# Patient Record
Sex: FEMALE | Race: ASIAN | NOT HISPANIC OR LATINO | Employment: PART TIME | URBAN - METROPOLITAN AREA
[De-identification: names, ages, dates, MRNs, and addresses within clinical notes are randomized per-mention and may not be internally consistent; named-entity substitution may affect disease eponyms.]

---

## 2017-08-11 ENCOUNTER — OFFICE VISIT - HEALTHEAST (OUTPATIENT)
Dept: FAMILY MEDICINE | Facility: CLINIC | Age: 37
End: 2017-08-11

## 2017-08-11 DIAGNOSIS — Z30.09 BIRTH CONTROL COUNSELING: ICD-10-CM

## 2017-08-11 DIAGNOSIS — K21.9 GASTROESOPHAGEAL REFLUX DISEASE WITHOUT ESOPHAGITIS: ICD-10-CM

## 2017-08-11 ASSESSMENT — MIFFLIN-ST. JEOR: SCORE: 1151.29

## 2017-09-21 ENCOUNTER — OFFICE VISIT - HEALTHEAST (OUTPATIENT)
Dept: FAMILY MEDICINE | Facility: CLINIC | Age: 37
End: 2017-09-21

## 2017-09-21 DIAGNOSIS — Z00.00 PREVENTATIVE HEALTH CARE: ICD-10-CM

## 2017-09-21 DIAGNOSIS — Z01.89 VISIT FOR LABORATORY TEST: ICD-10-CM

## 2017-09-21 DIAGNOSIS — K21.9 GASTROESOPHAGEAL REFLUX DISEASE WITHOUT ESOPHAGITIS: ICD-10-CM

## 2017-09-21 LAB
CHOLEST SERPL-MCNC: 232 MG/DL
FASTING STATUS PATIENT QL REPORTED: NO
HDLC SERPL-MCNC: 40 MG/DL
LDLC SERPL CALC-MCNC: ABNORMAL MG/DL
TRIGL SERPL-MCNC: 405 MG/DL

## 2017-09-21 ASSESSMENT — MIFFLIN-ST. JEOR: SCORE: 1151.41

## 2017-09-28 ENCOUNTER — OFFICE VISIT - HEALTHEAST (OUTPATIENT)
Dept: FAMILY MEDICINE | Facility: CLINIC | Age: 37
End: 2017-09-28

## 2017-09-28 DIAGNOSIS — F81.9 LEARNING PROBLEM: ICD-10-CM

## 2017-09-28 DIAGNOSIS — F43.10 PTSD (POST-TRAUMATIC STRESS DISORDER): ICD-10-CM

## 2017-09-28 DIAGNOSIS — Z78.9 NON-ENGLISH SPEAKING PATIENT: ICD-10-CM

## 2017-10-06 ENCOUNTER — OFFICE VISIT - HEALTHEAST (OUTPATIENT)
Dept: FAMILY MEDICINE | Facility: CLINIC | Age: 37
End: 2017-10-06

## 2017-10-06 DIAGNOSIS — E78.1 HYPERTRIGLYCERIDEMIA: ICD-10-CM

## 2017-10-06 DIAGNOSIS — K21.9 GASTROESOPHAGEAL REFLUX DISEASE WITHOUT ESOPHAGITIS: ICD-10-CM

## 2017-10-06 DIAGNOSIS — M54.9 BACK PAIN: ICD-10-CM

## 2017-10-06 DIAGNOSIS — R59.1 LYMPHADENOPATHY: ICD-10-CM

## 2017-10-06 LAB
CHOLEST SERPL-MCNC: 253 MG/DL
FASTING STATUS PATIENT QL REPORTED: YES
HDLC SERPL-MCNC: 48 MG/DL
LDLC SERPL CALC-MCNC: 141 MG/DL
TRIGL SERPL-MCNC: 321 MG/DL

## 2017-10-19 ENCOUNTER — OFFICE VISIT - HEALTHEAST (OUTPATIENT)
Dept: FAMILY MEDICINE | Facility: CLINIC | Age: 37
End: 2017-10-19

## 2017-10-19 DIAGNOSIS — Z12.4 ENCOUNTER FOR SCREENING FOR CERVICAL CANCER: ICD-10-CM

## 2017-10-19 DIAGNOSIS — N89.8 VAGINAL ITCHING: ICD-10-CM

## 2017-10-19 DIAGNOSIS — E78.00 HYPERCHOLESTEREMIA: ICD-10-CM

## 2017-10-19 DIAGNOSIS — E78.1 HYPERTRIGLYCERIDEMIA: ICD-10-CM

## 2017-10-19 ASSESSMENT — MIFFLIN-ST. JEOR: SCORE: 1151.69

## 2017-10-24 LAB
HPV INTERPRETATION - HISTORICAL: NORMAL
HPV INTERPRETER - HISTORICAL: NORMAL

## 2017-11-17 ENCOUNTER — OFFICE VISIT - HEALTHEAST (OUTPATIENT)
Dept: FAMILY MEDICINE | Facility: CLINIC | Age: 37
End: 2017-11-17

## 2017-11-17 DIAGNOSIS — R73.09 ABNORMAL GLUCOSE: ICD-10-CM

## 2017-11-17 DIAGNOSIS — K21.9 GASTROESOPHAGEAL REFLUX DISEASE WITHOUT ESOPHAGITIS: ICD-10-CM

## 2017-11-17 DIAGNOSIS — E78.5 HYPERLIPIDEMIA: ICD-10-CM

## 2017-11-17 DIAGNOSIS — N89.8 VAGINAL IRRITATION: ICD-10-CM

## 2017-11-17 DIAGNOSIS — E78.1 HYPERTRIGLYCERIDEMIA: ICD-10-CM

## 2017-11-17 DIAGNOSIS — E11.9 DIABETES (H): ICD-10-CM

## 2017-11-17 LAB — HBA1C MFR BLD: 9.6 % (ref 3.5–6)

## 2017-12-07 ENCOUNTER — OFFICE VISIT - HEALTHEAST (OUTPATIENT)
Dept: NURSING | Facility: CLINIC | Age: 37
End: 2017-12-07

## 2017-12-07 DIAGNOSIS — R10.13 DYSPEPSIA: ICD-10-CM

## 2017-12-07 DIAGNOSIS — E11.9 TYPE 2 DIABETES MELLITUS WITHOUT COMPLICATION, WITHOUT LONG-TERM CURRENT USE OF INSULIN (H): ICD-10-CM

## 2017-12-07 DIAGNOSIS — E78.5 DYSLIPIDEMIA: ICD-10-CM

## 2017-12-08 ENCOUNTER — OFFICE VISIT - HEALTHEAST (OUTPATIENT)
Dept: FAMILY MEDICINE | Facility: CLINIC | Age: 37
End: 2017-12-08

## 2017-12-08 DIAGNOSIS — K21.9 GASTROESOPHAGEAL REFLUX DISEASE WITHOUT ESOPHAGITIS: ICD-10-CM

## 2017-12-08 DIAGNOSIS — E11.9 DIABETES (H): ICD-10-CM

## 2018-01-03 ENCOUNTER — OFFICE VISIT - HEALTHEAST (OUTPATIENT)
Dept: FAMILY MEDICINE | Facility: CLINIC | Age: 38
End: 2018-01-03

## 2018-01-03 DIAGNOSIS — N89.8 VAGINA ITCHING: ICD-10-CM

## 2018-01-03 DIAGNOSIS — B37.31 YEAST VAGINITIS: ICD-10-CM

## 2018-01-03 LAB
CLUE CELLS: ABNORMAL
TRICHOMONAS, WET PREP: ABNORMAL
YEAST, WET PREP: ABNORMAL

## 2018-01-03 ASSESSMENT — MIFFLIN-ST. JEOR: SCORE: 1152.83

## 2018-01-04 ENCOUNTER — COMMUNICATION - HEALTHEAST (OUTPATIENT)
Dept: NURSING | Facility: CLINIC | Age: 38
End: 2018-01-04

## 2018-01-04 ENCOUNTER — OFFICE VISIT - HEALTHEAST (OUTPATIENT)
Dept: NURSING | Facility: CLINIC | Age: 38
End: 2018-01-04

## 2018-01-04 DIAGNOSIS — B37.31 VAGINAL YEAST INFECTION: ICD-10-CM

## 2018-01-04 DIAGNOSIS — E11.9 TYPE 2 DIABETES MELLITUS WITHOUT COMPLICATION, WITHOUT LONG-TERM CURRENT USE OF INSULIN (H): ICD-10-CM

## 2018-01-04 DIAGNOSIS — E78.5 DYSLIPIDEMIA: ICD-10-CM

## 2018-01-04 LAB
CHOLEST SERPL-MCNC: 147 MG/DL
FASTING STATUS PATIENT QL REPORTED: YES
HDLC SERPL-MCNC: 43 MG/DL
LDLC SERPL CALC-MCNC: 61 MG/DL
TRIGL SERPL-MCNC: 214 MG/DL

## 2018-01-05 LAB
C TRACH DNA SPEC QL PROBE+SIG AMP: NEGATIVE
N GONORRHOEA DNA SPEC QL NAA+PROBE: NEGATIVE

## 2018-01-18 ENCOUNTER — OFFICE VISIT - HEALTHEAST (OUTPATIENT)
Dept: FAMILY MEDICINE | Facility: CLINIC | Age: 38
End: 2018-01-18

## 2018-01-18 DIAGNOSIS — K21.9 GASTROESOPHAGEAL REFLUX DISEASE WITHOUT ESOPHAGITIS: ICD-10-CM

## 2018-01-18 DIAGNOSIS — E11.9 TYPE 2 DIABETES MELLITUS WITHOUT COMPLICATION, WITHOUT LONG-TERM CURRENT USE OF INSULIN (H): ICD-10-CM

## 2018-01-18 DIAGNOSIS — E11.9 DIABETES (H): ICD-10-CM

## 2018-01-18 LAB — HBA1C MFR BLD: 8.3 % (ref 3.5–6)

## 2018-02-07 ENCOUNTER — AMBULATORY - HEALTHEAST (OUTPATIENT)
Dept: EDUCATION SERVICES | Facility: CLINIC | Age: 38
End: 2018-02-07

## 2018-04-04 ENCOUNTER — OFFICE VISIT - HEALTHEAST (OUTPATIENT)
Dept: NURSING | Facility: CLINIC | Age: 38
End: 2018-04-04

## 2018-04-04 DIAGNOSIS — E11.9 TYPE 2 DIABETES MELLITUS WITHOUT COMPLICATION, WITHOUT LONG-TERM CURRENT USE OF INSULIN (H): ICD-10-CM

## 2018-04-04 DIAGNOSIS — N76.0 ACUTE VAGINITIS: ICD-10-CM

## 2018-04-19 ENCOUNTER — OFFICE VISIT - HEALTHEAST (OUTPATIENT)
Dept: FAMILY MEDICINE | Facility: CLINIC | Age: 38
End: 2018-04-19

## 2018-04-19 DIAGNOSIS — M79.10 MYALGIA: ICD-10-CM

## 2018-04-19 DIAGNOSIS — K21.9 GASTROESOPHAGEAL REFLUX DISEASE WITHOUT ESOPHAGITIS: ICD-10-CM

## 2018-04-19 DIAGNOSIS — E11.9 TYPE 2 DIABETES MELLITUS WITHOUT COMPLICATION, WITHOUT LONG-TERM CURRENT USE OF INSULIN (H): ICD-10-CM

## 2018-04-19 DIAGNOSIS — N76.0 VAGINITIS: ICD-10-CM

## 2018-04-19 LAB
ALBUMIN SERPL-MCNC: 3.6 G/DL (ref 3.5–5)
ALP SERPL-CCNC: 65 U/L (ref 45–120)
ALT SERPL W P-5'-P-CCNC: 33 U/L (ref 0–45)
ANION GAP SERPL CALCULATED.3IONS-SCNC: 10 MMOL/L (ref 5–18)
AST SERPL W P-5'-P-CCNC: 22 U/L (ref 0–40)
BILIRUB SERPL-MCNC: 0.4 MG/DL (ref 0–1)
BUN SERPL-MCNC: 10 MG/DL (ref 8–22)
CALCIUM SERPL-MCNC: 9.2 MG/DL (ref 8.5–10.5)
CHLORIDE BLD-SCNC: 103 MMOL/L (ref 98–107)
CO2 SERPL-SCNC: 23 MMOL/L (ref 22–31)
CREAT SERPL-MCNC: 0.76 MG/DL (ref 0.6–1.1)
CREAT UR-MCNC: 36.5 MG/DL
GFR SERPL CREATININE-BSD FRML MDRD: >60 ML/MIN/1.73M2
GLUCOSE BLD-MCNC: 260 MG/DL (ref 70–125)
MICROALBUMIN UR-MCNC: 5 MG/DL (ref 0–1.99)
MICROALBUMIN/CREAT UR: 137 MG/G
POTASSIUM BLD-SCNC: 4.1 MMOL/L (ref 3.5–5)
PROT SERPL-MCNC: 7.3 G/DL (ref 6–8)
SODIUM SERPL-SCNC: 136 MMOL/L (ref 136–145)

## 2018-04-19 ASSESSMENT — MIFFLIN-ST. JEOR: SCORE: 1179.19

## 2018-05-02 ENCOUNTER — AMBULATORY - HEALTHEAST (OUTPATIENT)
Dept: EDUCATION SERVICES | Facility: CLINIC | Age: 38
End: 2018-05-02

## 2018-05-02 DIAGNOSIS — E11.9 TYPE 2 DIABETES MELLITUS WITHOUT COMPLICATION, WITHOUT LONG-TERM CURRENT USE OF INSULIN (H): ICD-10-CM

## 2018-05-02 LAB — HBA1C MFR BLD: 7.6 % (ref 3.5–6)

## 2018-05-04 ENCOUNTER — COMMUNICATION - HEALTHEAST (OUTPATIENT)
Dept: FAMILY MEDICINE | Facility: CLINIC | Age: 38
End: 2018-05-04

## 2018-05-17 ENCOUNTER — OFFICE VISIT - HEALTHEAST (OUTPATIENT)
Dept: FAMILY MEDICINE | Facility: CLINIC | Age: 38
End: 2018-05-17

## 2018-05-17 DIAGNOSIS — Z02.1 PHYSICAL EXAM, PRE-EMPLOYMENT: ICD-10-CM

## 2018-05-17 DIAGNOSIS — E11.9 TYPE 2 DIABETES MELLITUS WITHOUT COMPLICATION, WITHOUT LONG-TERM CURRENT USE OF INSULIN (H): ICD-10-CM

## 2018-05-17 DIAGNOSIS — K21.9 GASTROESOPHAGEAL REFLUX DISEASE WITHOUT ESOPHAGITIS: ICD-10-CM

## 2018-05-17 ASSESSMENT — MIFFLIN-ST. JEOR: SCORE: 1165.59

## 2018-05-21 LAB
QTF INTERPRETATION: NORMAL
QTF MITOGEN - NIL: 7.31 IU/ML
QTF NIL: 0.07 IU/ML
QTF RESULT: NEGATIVE
QTF TB ANTIGEN - NIL: 0.03 IU/ML

## 2018-05-22 ENCOUNTER — COMMUNICATION - HEALTHEAST (OUTPATIENT)
Dept: FAMILY MEDICINE | Facility: CLINIC | Age: 38
End: 2018-05-22

## 2018-06-07 ENCOUNTER — OFFICE VISIT - HEALTHEAST (OUTPATIENT)
Dept: FAMILY MEDICINE | Facility: CLINIC | Age: 38
End: 2018-06-07

## 2018-06-07 DIAGNOSIS — F81.9 LEARNING PROBLEM: ICD-10-CM

## 2018-06-07 DIAGNOSIS — E11.9 DIABETES (H): ICD-10-CM

## 2018-06-07 DIAGNOSIS — K21.9 GASTROESOPHAGEAL REFLUX DISEASE WITHOUT ESOPHAGITIS: ICD-10-CM

## 2018-06-07 DIAGNOSIS — Z30.09 BIRTH CONTROL COUNSELING: ICD-10-CM

## 2018-06-07 ASSESSMENT — MIFFLIN-ST. JEOR: SCORE: 1163.83

## 2018-06-27 ENCOUNTER — COMMUNICATION - HEALTHEAST (OUTPATIENT)
Dept: FAMILY MEDICINE | Facility: CLINIC | Age: 38
End: 2018-06-27

## 2018-08-18 ENCOUNTER — COMMUNICATION - HEALTHEAST (OUTPATIENT)
Dept: FAMILY MEDICINE | Facility: CLINIC | Age: 38
End: 2018-08-18

## 2018-09-06 ENCOUNTER — OFFICE VISIT - HEALTHEAST (OUTPATIENT)
Dept: FAMILY MEDICINE | Facility: CLINIC | Age: 38
End: 2018-09-06

## 2018-09-06 DIAGNOSIS — E11.9 TYPE 2 DIABETES MELLITUS WITHOUT COMPLICATION, WITHOUT LONG-TERM CURRENT USE OF INSULIN (H): ICD-10-CM

## 2018-09-06 DIAGNOSIS — Z23 NEED FOR IMMUNIZATION AGAINST INFLUENZA: ICD-10-CM

## 2018-09-06 DIAGNOSIS — N89.8 VAGINAL ITCHING: ICD-10-CM

## 2018-09-06 LAB
CLUE CELLS: NORMAL
TRICHOMONAS, WET PREP: NORMAL
YEAST, WET PREP: NORMAL

## 2018-09-06 ASSESSMENT — MIFFLIN-ST. JEOR: SCORE: 1149.4

## 2018-09-07 LAB
C TRACH DNA SPEC QL PROBE+SIG AMP: NEGATIVE
N GONORRHOEA DNA SPEC QL NAA+PROBE: NEGATIVE

## 2018-10-08 ENCOUNTER — OFFICE VISIT - HEALTHEAST (OUTPATIENT)
Dept: PHARMACY | Facility: CLINIC | Age: 38
End: 2018-10-08

## 2018-10-08 ENCOUNTER — COMMUNICATION - HEALTHEAST (OUTPATIENT)
Dept: SCHEDULING | Facility: CLINIC | Age: 38
End: 2018-10-08

## 2018-10-08 ENCOUNTER — AMBULATORY - HEALTHEAST (OUTPATIENT)
Dept: LAB | Facility: CLINIC | Age: 38
End: 2018-10-08

## 2018-10-08 DIAGNOSIS — E11.9 TYPE 2 DIABETES MELLITUS WITHOUT COMPLICATION, WITHOUT LONG-TERM CURRENT USE OF INSULIN (H): ICD-10-CM

## 2018-10-08 DIAGNOSIS — E78.5 DYSLIPIDEMIA: ICD-10-CM

## 2018-10-08 DIAGNOSIS — K21.9 GASTROESOPHAGEAL REFLUX DISEASE WITHOUT ESOPHAGITIS: ICD-10-CM

## 2018-10-08 LAB
ANION GAP SERPL CALCULATED.3IONS-SCNC: 11 MMOL/L (ref 5–18)
BUN SERPL-MCNC: 8 MG/DL (ref 8–22)
CALCIUM SERPL-MCNC: 9 MG/DL (ref 8.5–10.5)
CHLORIDE BLD-SCNC: 100 MMOL/L (ref 98–107)
CO2 SERPL-SCNC: 23 MMOL/L (ref 22–31)
CREAT SERPL-MCNC: 0.84 MG/DL (ref 0.6–1.1)
GFR SERPL CREATININE-BSD FRML MDRD: >60 ML/MIN/1.73M2
GLUCOSE BLD-MCNC: 476 MG/DL (ref 70–125)
HBA1C MFR BLD: 9 % (ref 3.5–6)
POTASSIUM BLD-SCNC: 3.9 MMOL/L (ref 3.5–5)
SODIUM SERPL-SCNC: 134 MMOL/L (ref 136–145)

## 2018-10-09 ENCOUNTER — COMMUNICATION - HEALTHEAST (OUTPATIENT)
Dept: PHARMACY | Facility: CLINIC | Age: 38
End: 2018-10-09

## 2018-10-29 ENCOUNTER — COMMUNICATION - HEALTHEAST (OUTPATIENT)
Dept: FAMILY MEDICINE | Facility: CLINIC | Age: 38
End: 2018-10-29

## 2018-12-11 ENCOUNTER — OFFICE VISIT - HEALTHEAST (OUTPATIENT)
Dept: FAMILY MEDICINE | Facility: CLINIC | Age: 38
End: 2018-12-11

## 2018-12-11 DIAGNOSIS — E11.9 TYPE 2 DIABETES MELLITUS WITHOUT COMPLICATION, WITHOUT LONG-TERM CURRENT USE OF INSULIN (H): ICD-10-CM

## 2018-12-11 DIAGNOSIS — K21.9 GASTROESOPHAGEAL REFLUX DISEASE WITHOUT ESOPHAGITIS: ICD-10-CM

## 2018-12-11 DIAGNOSIS — E55.9 VITAMIN D DEFICIENCY: ICD-10-CM

## 2018-12-11 DIAGNOSIS — E78.5 HYPERLIPIDEMIA LDL GOAL <100: ICD-10-CM

## 2018-12-11 ASSESSMENT — MIFFLIN-ST. JEOR: SCORE: 1171.54

## 2018-12-13 ENCOUNTER — COMMUNICATION - HEALTHEAST (OUTPATIENT)
Dept: FAMILY MEDICINE | Facility: CLINIC | Age: 38
End: 2018-12-13

## 2018-12-24 ENCOUNTER — COMMUNICATION - HEALTHEAST (OUTPATIENT)
Dept: FAMILY MEDICINE | Facility: CLINIC | Age: 38
End: 2018-12-24

## 2018-12-24 DIAGNOSIS — K21.9 GASTROESOPHAGEAL REFLUX DISEASE WITHOUT ESOPHAGITIS: ICD-10-CM

## 2018-12-26 ENCOUNTER — COMMUNICATION - HEALTHEAST (OUTPATIENT)
Dept: FAMILY MEDICINE | Facility: CLINIC | Age: 38
End: 2018-12-26

## 2018-12-26 ENCOUNTER — RECORDS - HEALTHEAST (OUTPATIENT)
Dept: GENERAL RADIOLOGY | Facility: CLINIC | Age: 38
End: 2018-12-26

## 2018-12-26 ENCOUNTER — OFFICE VISIT - HEALTHEAST (OUTPATIENT)
Dept: FAMILY MEDICINE | Facility: CLINIC | Age: 38
End: 2018-12-26

## 2018-12-26 DIAGNOSIS — K21.9 GASTROESOPHAGEAL REFLUX DISEASE WITHOUT ESOPHAGITIS: ICD-10-CM

## 2018-12-26 DIAGNOSIS — M25.511 RIGHT ANTERIOR SHOULDER PAIN: ICD-10-CM

## 2018-12-26 DIAGNOSIS — E11.9 TYPE 2 DIABETES MELLITUS WITHOUT COMPLICATION, WITHOUT LONG-TERM CURRENT USE OF INSULIN (H): ICD-10-CM

## 2018-12-26 DIAGNOSIS — M79.10 MYALGIA: ICD-10-CM

## 2018-12-26 DIAGNOSIS — M25.511 PAIN IN RIGHT SHOULDER: ICD-10-CM

## 2018-12-26 DIAGNOSIS — E78.5 HYPERLIPIDEMIA LDL GOAL <100: ICD-10-CM

## 2018-12-26 LAB
ALBUMIN SERPL-MCNC: 3.8 G/DL (ref 3.5–5)
ALP SERPL-CCNC: 56 U/L (ref 45–120)
ALT SERPL W P-5'-P-CCNC: 35 U/L (ref 0–45)
ANION GAP SERPL CALCULATED.3IONS-SCNC: 10 MMOL/L (ref 5–18)
AST SERPL W P-5'-P-CCNC: 25 U/L (ref 0–40)
BILIRUB SERPL-MCNC: 0.8 MG/DL (ref 0–1)
BUN SERPL-MCNC: 12 MG/DL (ref 8–22)
CALCIUM SERPL-MCNC: 9.6 MG/DL (ref 8.5–10.5)
CHLORIDE BLD-SCNC: 103 MMOL/L (ref 98–107)
CHOLEST SERPL-MCNC: 170 MG/DL
CO2 SERPL-SCNC: 26 MMOL/L (ref 22–31)
CREAT SERPL-MCNC: 0.73 MG/DL (ref 0.6–1.1)
FASTING STATUS PATIENT QL REPORTED: YES
GFR SERPL CREATININE-BSD FRML MDRD: >60 ML/MIN/1.73M2
GLUCOSE BLD-MCNC: 163 MG/DL (ref 70–125)
HDLC SERPL-MCNC: 44 MG/DL
LDLC SERPL CALC-MCNC: 78 MG/DL
POTASSIUM BLD-SCNC: 3.6 MMOL/L (ref 3.5–5)
PROT SERPL-MCNC: 7.1 G/DL (ref 6–8)
SODIUM SERPL-SCNC: 139 MMOL/L (ref 136–145)
TRIGL SERPL-MCNC: 239 MG/DL

## 2018-12-26 ASSESSMENT — MIFFLIN-ST. JEOR: SCORE: 1157.93

## 2019-01-03 ENCOUNTER — OFFICE VISIT - HEALTHEAST (OUTPATIENT)
Dept: PHYSICAL THERAPY | Facility: REHABILITATION | Age: 39
End: 2019-01-03

## 2019-01-03 DIAGNOSIS — R29.3 POOR POSTURE: ICD-10-CM

## 2019-01-03 DIAGNOSIS — M75.41 IMPINGEMENT SYNDROME OF RIGHT SHOULDER: ICD-10-CM

## 2019-01-03 DIAGNOSIS — M62.81 GENERALIZED MUSCLE WEAKNESS: ICD-10-CM

## 2019-01-09 ENCOUNTER — OFFICE VISIT - HEALTHEAST (OUTPATIENT)
Dept: PHYSICAL THERAPY | Facility: REHABILITATION | Age: 39
End: 2019-01-09

## 2019-01-09 DIAGNOSIS — M75.41 IMPINGEMENT SYNDROME OF RIGHT SHOULDER: ICD-10-CM

## 2019-01-09 DIAGNOSIS — M62.81 GENERALIZED MUSCLE WEAKNESS: ICD-10-CM

## 2019-01-09 DIAGNOSIS — R29.3 POOR POSTURE: ICD-10-CM

## 2019-01-16 ENCOUNTER — OFFICE VISIT - HEALTHEAST (OUTPATIENT)
Dept: PHYSICAL THERAPY | Facility: REHABILITATION | Age: 39
End: 2019-01-16

## 2019-01-16 DIAGNOSIS — M75.41 IMPINGEMENT SYNDROME OF RIGHT SHOULDER: ICD-10-CM

## 2019-01-16 DIAGNOSIS — R29.3 POOR POSTURE: ICD-10-CM

## 2019-01-16 DIAGNOSIS — M62.81 GENERALIZED MUSCLE WEAKNESS: ICD-10-CM

## 2019-01-23 ENCOUNTER — OFFICE VISIT - HEALTHEAST (OUTPATIENT)
Dept: PHYSICAL THERAPY | Facility: REHABILITATION | Age: 39
End: 2019-01-23

## 2019-01-23 DIAGNOSIS — M75.41 IMPINGEMENT SYNDROME OF RIGHT SHOULDER: ICD-10-CM

## 2019-01-23 DIAGNOSIS — M62.81 GENERALIZED MUSCLE WEAKNESS: ICD-10-CM

## 2019-01-23 DIAGNOSIS — R29.3 POOR POSTURE: ICD-10-CM

## 2019-01-27 ENCOUNTER — COMMUNICATION - HEALTHEAST (OUTPATIENT)
Dept: FAMILY MEDICINE | Facility: CLINIC | Age: 39
End: 2019-01-27

## 2019-01-27 DIAGNOSIS — E78.5 HYPERLIPIDEMIA LDL GOAL <100: ICD-10-CM

## 2019-01-31 ENCOUNTER — OFFICE VISIT - HEALTHEAST (OUTPATIENT)
Dept: PHYSICAL THERAPY | Facility: REHABILITATION | Age: 39
End: 2019-01-31

## 2019-01-31 DIAGNOSIS — M75.41 IMPINGEMENT SYNDROME OF RIGHT SHOULDER: ICD-10-CM

## 2019-01-31 DIAGNOSIS — M62.81 GENERALIZED MUSCLE WEAKNESS: ICD-10-CM

## 2019-01-31 DIAGNOSIS — R29.3 POOR POSTURE: ICD-10-CM

## 2019-02-04 ENCOUNTER — OFFICE VISIT - HEALTHEAST (OUTPATIENT)
Dept: PHYSICAL THERAPY | Facility: REHABILITATION | Age: 39
End: 2019-02-04

## 2019-02-04 DIAGNOSIS — M75.41 IMPINGEMENT SYNDROME OF RIGHT SHOULDER: ICD-10-CM

## 2019-02-04 DIAGNOSIS — R29.3 POOR POSTURE: ICD-10-CM

## 2019-02-04 DIAGNOSIS — M62.81 GENERALIZED MUSCLE WEAKNESS: ICD-10-CM

## 2019-02-26 ENCOUNTER — OFFICE VISIT - HEALTHEAST (OUTPATIENT)
Dept: FAMILY MEDICINE | Facility: CLINIC | Age: 39
End: 2019-02-26

## 2019-02-26 DIAGNOSIS — E11.9 TYPE 2 DIABETES MELLITUS WITHOUT COMPLICATION, WITHOUT LONG-TERM CURRENT USE OF INSULIN (H): ICD-10-CM

## 2019-02-26 DIAGNOSIS — E78.5 HYPERLIPIDEMIA LDL GOAL <100: ICD-10-CM

## 2019-02-26 DIAGNOSIS — K21.9 GASTROESOPHAGEAL REFLUX DISEASE WITHOUT ESOPHAGITIS: ICD-10-CM

## 2019-02-26 LAB — HBA1C MFR BLD: 8.7 % (ref 3.5–6)

## 2019-02-26 ASSESSMENT — MIFFLIN-ST. JEOR: SCORE: 1173.81

## 2019-02-27 ENCOUNTER — COMMUNICATION - HEALTHEAST (OUTPATIENT)
Dept: FAMILY MEDICINE | Facility: CLINIC | Age: 39
End: 2019-02-27

## 2019-03-07 ENCOUNTER — OFFICE VISIT - HEALTHEAST (OUTPATIENT)
Dept: PHARMACY | Facility: CLINIC | Age: 39
End: 2019-03-07

## 2019-03-07 DIAGNOSIS — K21.9 GASTROESOPHAGEAL REFLUX DISEASE WITHOUT ESOPHAGITIS: ICD-10-CM

## 2019-03-07 DIAGNOSIS — E11.9 TYPE 2 DIABETES MELLITUS WITHOUT COMPLICATION, WITHOUT LONG-TERM CURRENT USE OF INSULIN (H): ICD-10-CM

## 2019-03-07 DIAGNOSIS — E78.5 DYSLIPIDEMIA: ICD-10-CM

## 2019-03-12 ENCOUNTER — OFFICE VISIT - HEALTHEAST (OUTPATIENT)
Dept: FAMILY MEDICINE | Facility: CLINIC | Age: 39
End: 2019-03-12

## 2019-03-12 ENCOUNTER — COMMUNICATION - HEALTHEAST (OUTPATIENT)
Dept: FAMILY MEDICINE | Facility: CLINIC | Age: 39
End: 2019-03-12

## 2019-03-12 DIAGNOSIS — N30.01 ACUTE CYSTITIS WITH HEMATURIA: ICD-10-CM

## 2019-03-12 DIAGNOSIS — K21.9 GASTROESOPHAGEAL REFLUX DISEASE WITHOUT ESOPHAGITIS: ICD-10-CM

## 2019-03-12 DIAGNOSIS — E11.9 TYPE 2 DIABETES MELLITUS WITHOUT COMPLICATION, WITHOUT LONG-TERM CURRENT USE OF INSULIN (H): ICD-10-CM

## 2019-03-12 DIAGNOSIS — E78.5 HYPERLIPIDEMIA LDL GOAL <100: ICD-10-CM

## 2019-03-12 DIAGNOSIS — R30.0 DYSURIA: ICD-10-CM

## 2019-03-12 LAB
ALBUMIN UR-MCNC: ABNORMAL MG/DL
APPEARANCE UR: ABNORMAL
BACTERIA #/AREA URNS HPF: ABNORMAL HPF
BILIRUB UR QL STRIP: NEGATIVE
COLOR UR AUTO: YELLOW
GLUCOSE UR STRIP-MCNC: ABNORMAL MG/DL
HGB UR QL STRIP: ABNORMAL
KETONES UR STRIP-MCNC: ABNORMAL MG/DL
LEUKOCYTE ESTERASE UR QL STRIP: NEGATIVE
NITRATE UR QL: NEGATIVE
PH UR STRIP: 6 [PH] (ref 5–8)
RBC #/AREA URNS AUTO: ABNORMAL HPF
SP GR UR STRIP: 1.02 (ref 1–1.03)
SQUAMOUS #/AREA URNS AUTO: ABNORMAL LPF
UROBILINOGEN UR STRIP-ACNC: ABNORMAL
WBC #/AREA URNS AUTO: ABNORMAL HPF
WBC CLUMPS #/AREA URNS HPF: PRESENT /[HPF]

## 2019-03-12 ASSESSMENT — MIFFLIN-ST. JEOR: SCORE: 1186.85

## 2019-03-13 ENCOUNTER — COMMUNICATION - HEALTHEAST (OUTPATIENT)
Dept: FAMILY MEDICINE | Facility: CLINIC | Age: 39
End: 2019-03-13

## 2019-03-13 ENCOUNTER — RECORDS - HEALTHEAST (OUTPATIENT)
Dept: ADMINISTRATIVE | Facility: OTHER | Age: 39
End: 2019-03-13

## 2019-03-15 LAB — BACTERIA SPEC CULT: ABNORMAL

## 2019-03-22 ENCOUNTER — RECORDS - HEALTHEAST (OUTPATIENT)
Dept: HEALTH INFORMATION MANAGEMENT | Facility: CLINIC | Age: 39
End: 2019-03-22

## 2019-04-12 ENCOUNTER — AMBULATORY - HEALTHEAST (OUTPATIENT)
Dept: LAB | Facility: CLINIC | Age: 39
End: 2019-04-12

## 2019-04-12 ENCOUNTER — OFFICE VISIT - HEALTHEAST (OUTPATIENT)
Dept: PHARMACY | Facility: CLINIC | Age: 39
End: 2019-04-12

## 2019-04-12 DIAGNOSIS — N30.01 ACUTE CYSTITIS WITH HEMATURIA: ICD-10-CM

## 2019-04-12 DIAGNOSIS — E78.5 DYSLIPIDEMIA: ICD-10-CM

## 2019-04-12 DIAGNOSIS — E11.9 TYPE 2 DIABETES MELLITUS WITHOUT COMPLICATION, WITHOUT LONG-TERM CURRENT USE OF INSULIN (H): ICD-10-CM

## 2019-04-12 DIAGNOSIS — K21.9 GASTROESOPHAGEAL REFLUX DISEASE WITHOUT ESOPHAGITIS: ICD-10-CM

## 2019-04-12 LAB
ANION GAP SERPL CALCULATED.3IONS-SCNC: 12 MMOL/L (ref 5–18)
BUN SERPL-MCNC: 9 MG/DL (ref 8–22)
CALCIUM SERPL-MCNC: 9.8 MG/DL (ref 8.5–10.5)
CHLORIDE BLD-SCNC: 105 MMOL/L (ref 98–107)
CO2 SERPL-SCNC: 21 MMOL/L (ref 22–31)
CREAT SERPL-MCNC: 0.75 MG/DL (ref 0.6–1.1)
GFR SERPL CREATININE-BSD FRML MDRD: >60 ML/MIN/1.73M2
GLUCOSE BLD-MCNC: 221 MG/DL (ref 70–125)
POTASSIUM BLD-SCNC: 3.9 MMOL/L (ref 3.5–5)
SODIUM SERPL-SCNC: 138 MMOL/L (ref 136–145)

## 2019-05-08 ENCOUNTER — AMBULATORY - HEALTHEAST (OUTPATIENT)
Dept: FAMILY MEDICINE | Facility: CLINIC | Age: 39
End: 2019-05-08

## 2019-05-08 DIAGNOSIS — E11.9 DIABETES MELLITUS (H): ICD-10-CM

## 2019-05-09 ENCOUNTER — OFFICE VISIT - HEALTHEAST (OUTPATIENT)
Dept: FAMILY MEDICINE | Facility: CLINIC | Age: 39
End: 2019-05-09

## 2019-05-09 DIAGNOSIS — N18.30 TYPE 2 DIABETES MELLITUS WITH STAGE 3 CHRONIC KIDNEY DISEASE, WITH LONG-TERM CURRENT USE OF INSULIN (H): ICD-10-CM

## 2019-05-09 DIAGNOSIS — E11.9 TYPE 2 DIABETES MELLITUS WITHOUT COMPLICATION, WITHOUT LONG-TERM CURRENT USE OF INSULIN (H): ICD-10-CM

## 2019-05-09 DIAGNOSIS — E11.22 TYPE 2 DIABETES MELLITUS WITH STAGE 3 CHRONIC KIDNEY DISEASE, WITH LONG-TERM CURRENT USE OF INSULIN (H): ICD-10-CM

## 2019-05-09 DIAGNOSIS — Z79.4 TYPE 2 DIABETES MELLITUS WITH STAGE 3 CHRONIC KIDNEY DISEASE, WITH LONG-TERM CURRENT USE OF INSULIN (H): ICD-10-CM

## 2019-05-09 DIAGNOSIS — N76.0 ACUTE VAGINITIS: ICD-10-CM

## 2019-05-09 DIAGNOSIS — E78.5 HYPERLIPIDEMIA LDL GOAL <100: ICD-10-CM

## 2019-05-09 LAB
CREAT UR-MCNC: 226.2 MG/DL
HBA1C MFR BLD: 8.1 % (ref 3.5–6)
MICROALBUMIN UR-MCNC: 26.3 MG/DL (ref 0–1.99)
MICROALBUMIN/CREAT UR: 116.3 MG/G

## 2019-05-09 ASSESSMENT — MIFFLIN-ST. JEOR: SCORE: 1181.46

## 2019-05-16 ENCOUNTER — OFFICE VISIT - HEALTHEAST (OUTPATIENT)
Dept: PHARMACY | Facility: CLINIC | Age: 39
End: 2019-05-16

## 2019-05-16 DIAGNOSIS — K21.9 GASTROESOPHAGEAL REFLUX DISEASE WITHOUT ESOPHAGITIS: ICD-10-CM

## 2019-05-16 DIAGNOSIS — Z91.148 NONCOMPLIANCE WITH MEDICATION REGIMEN: ICD-10-CM

## 2019-05-16 DIAGNOSIS — E11.9 TYPE 2 DIABETES MELLITUS WITHOUT COMPLICATION, WITHOUT LONG-TERM CURRENT USE OF INSULIN (H): ICD-10-CM

## 2019-05-16 DIAGNOSIS — E78.5 DYSLIPIDEMIA: ICD-10-CM

## 2019-05-21 ENCOUNTER — COMMUNICATION - HEALTHEAST (OUTPATIENT)
Dept: FAMILY MEDICINE | Facility: CLINIC | Age: 39
End: 2019-05-21

## 2019-05-21 DIAGNOSIS — Z30.09 BIRTH CONTROL COUNSELING: ICD-10-CM

## 2019-06-18 ENCOUNTER — OFFICE VISIT - HEALTHEAST (OUTPATIENT)
Dept: PHARMACY | Facility: CLINIC | Age: 39
End: 2019-06-18

## 2019-06-18 DIAGNOSIS — M19.90 ARTHRITIS: ICD-10-CM

## 2019-06-18 DIAGNOSIS — E11.9 TYPE 2 DIABETES MELLITUS WITHOUT COMPLICATION, WITHOUT LONG-TERM CURRENT USE OF INSULIN (H): ICD-10-CM

## 2019-06-18 DIAGNOSIS — K21.9 GASTROESOPHAGEAL REFLUX DISEASE WITHOUT ESOPHAGITIS: ICD-10-CM

## 2019-06-18 DIAGNOSIS — E78.5 DYSLIPIDEMIA: ICD-10-CM

## 2019-08-27 ENCOUNTER — COMMUNICATION - HEALTHEAST (OUTPATIENT)
Dept: TELEHEALTH | Facility: CLINIC | Age: 39
End: 2019-08-27

## 2019-08-27 ENCOUNTER — OFFICE VISIT - HEALTHEAST (OUTPATIENT)
Dept: PHARMACY | Facility: CLINIC | Age: 39
End: 2019-08-27

## 2019-08-27 ENCOUNTER — AMBULATORY - HEALTHEAST (OUTPATIENT)
Dept: LAB | Facility: CLINIC | Age: 39
End: 2019-08-27

## 2019-08-27 DIAGNOSIS — M19.90 ARTHRITIS: ICD-10-CM

## 2019-08-27 DIAGNOSIS — K21.9 GASTROESOPHAGEAL REFLUX DISEASE WITHOUT ESOPHAGITIS: ICD-10-CM

## 2019-08-27 DIAGNOSIS — E11.9 TYPE 2 DIABETES MELLITUS WITHOUT COMPLICATION, WITHOUT LONG-TERM CURRENT USE OF INSULIN (H): ICD-10-CM

## 2019-08-27 DIAGNOSIS — E78.5 DYSLIPIDEMIA: ICD-10-CM

## 2019-08-27 LAB — HBA1C MFR BLD: 8.7 % (ref 3.5–6)

## 2019-09-04 ENCOUNTER — COMMUNICATION - HEALTHEAST (OUTPATIENT)
Dept: FAMILY MEDICINE | Facility: CLINIC | Age: 39
End: 2019-09-04

## 2019-09-04 DIAGNOSIS — E11.9 TYPE 2 DIABETES MELLITUS WITHOUT COMPLICATION, WITHOUT LONG-TERM CURRENT USE OF INSULIN (H): ICD-10-CM

## 2019-09-27 ENCOUNTER — OFFICE VISIT - HEALTHEAST (OUTPATIENT)
Dept: PHARMACY | Facility: CLINIC | Age: 39
End: 2019-09-27

## 2019-11-05 ENCOUNTER — OFFICE VISIT - HEALTHEAST (OUTPATIENT)
Dept: PHARMACY | Facility: CLINIC | Age: 39
End: 2019-11-05

## 2019-11-05 DIAGNOSIS — K21.9 GASTROESOPHAGEAL REFLUX DISEASE WITHOUT ESOPHAGITIS: ICD-10-CM

## 2019-11-05 DIAGNOSIS — E11.9 TYPE 2 DIABETES MELLITUS WITHOUT COMPLICATION, WITHOUT LONG-TERM CURRENT USE OF INSULIN (H): ICD-10-CM

## 2019-11-05 DIAGNOSIS — E78.5 DYSLIPIDEMIA: ICD-10-CM

## 2019-11-06 ENCOUNTER — OFFICE VISIT - HEALTHEAST (OUTPATIENT)
Dept: FAMILY MEDICINE | Facility: CLINIC | Age: 39
End: 2019-11-06

## 2019-11-06 DIAGNOSIS — K21.9 GASTROESOPHAGEAL REFLUX DISEASE WITHOUT ESOPHAGITIS: ICD-10-CM

## 2019-11-06 DIAGNOSIS — R10.30 LOWER ABDOMINAL PAIN: ICD-10-CM

## 2019-11-06 DIAGNOSIS — E11.9 TYPE 2 DIABETES MELLITUS WITHOUT COMPLICATION, WITHOUT LONG-TERM CURRENT USE OF INSULIN (H): ICD-10-CM

## 2019-11-06 DIAGNOSIS — E08.00 DIABETES MELLITUS DUE TO UNDERLYING CONDITION WITH HYPEROSMOLARITY WITHOUT COMA, WITHOUT LONG-TERM CURRENT USE OF INSULIN (H): ICD-10-CM

## 2019-11-06 LAB
ALBUMIN UR-MCNC: NEGATIVE MG/DL
APPEARANCE UR: ABNORMAL
BILIRUB UR QL STRIP: NEGATIVE
COLOR UR AUTO: YELLOW
GLUCOSE UR STRIP-MCNC: NEGATIVE MG/DL
HBA1C MFR BLD: 6.8 % (ref 3.5–6)
HGB UR QL STRIP: NEGATIVE
KETONES UR STRIP-MCNC: ABNORMAL MG/DL
LEUKOCYTE ESTERASE UR QL STRIP: NEGATIVE
NITRATE UR QL: NEGATIVE
PH UR STRIP: 5.5 [PH] (ref 5–8)
SP GR UR STRIP: >=1.03 (ref 1–1.03)
UROBILINOGEN UR STRIP-ACNC: ABNORMAL

## 2019-11-06 ASSESSMENT — MIFFLIN-ST. JEOR: SCORE: 1176.93

## 2019-11-26 ENCOUNTER — OFFICE VISIT - HEALTHEAST (OUTPATIENT)
Dept: PHARMACY | Facility: CLINIC | Age: 39
End: 2019-11-26

## 2019-11-26 DIAGNOSIS — E11.9 TYPE 2 DIABETES MELLITUS WITHOUT COMPLICATION, WITHOUT LONG-TERM CURRENT USE OF INSULIN (H): ICD-10-CM

## 2019-11-26 DIAGNOSIS — Z78.9 USES BIRTH CONTROL: ICD-10-CM

## 2019-11-26 DIAGNOSIS — K21.9 GASTROESOPHAGEAL REFLUX DISEASE WITHOUT ESOPHAGITIS: ICD-10-CM

## 2019-11-26 DIAGNOSIS — E78.5 DYSLIPIDEMIA: ICD-10-CM

## 2020-01-01 ENCOUNTER — COMMUNICATION - HEALTHEAST (OUTPATIENT)
Dept: FAMILY MEDICINE | Facility: CLINIC | Age: 40
End: 2020-01-01

## 2020-01-01 DIAGNOSIS — E78.5 HYPERLIPIDEMIA LDL GOAL <100: ICD-10-CM

## 2020-01-07 ENCOUNTER — OFFICE VISIT - HEALTHEAST (OUTPATIENT)
Dept: FAMILY MEDICINE | Facility: CLINIC | Age: 40
End: 2020-01-07

## 2020-01-07 DIAGNOSIS — E11.9 DIABETES MELLITUS (H): ICD-10-CM

## 2020-01-07 DIAGNOSIS — B00.1 COLD SORE: ICD-10-CM

## 2020-01-07 DIAGNOSIS — K21.9 GASTROESOPHAGEAL REFLUX DISEASE WITHOUT ESOPHAGITIS: ICD-10-CM

## 2020-01-07 DIAGNOSIS — Z23 NEED FOR IMMUNIZATION AGAINST INFLUENZA: ICD-10-CM

## 2020-01-07 DIAGNOSIS — E08.00 DIABETES MELLITUS DUE TO UNDERLYING CONDITION WITH HYPEROSMOLARITY WITHOUT COMA, WITHOUT LONG-TERM CURRENT USE OF INSULIN (H): ICD-10-CM

## 2020-01-07 DIAGNOSIS — E11.9 TYPE 2 DIABETES MELLITUS WITHOUT COMPLICATION, WITHOUT LONG-TERM CURRENT USE OF INSULIN (H): ICD-10-CM

## 2020-01-07 DIAGNOSIS — E78.5 HYPERLIPIDEMIA LDL GOAL <100: ICD-10-CM

## 2020-01-07 DIAGNOSIS — Z23 IMMUNIZATION DUE: ICD-10-CM

## 2020-01-07 LAB
CREAT UR-MCNC: 40 MG/DL
MICROALBUMIN UR-MCNC: 2.24 MG/DL (ref 0–1.99)
MICROALBUMIN/CREAT UR: 56 MG/G

## 2020-01-07 ASSESSMENT — MIFFLIN-ST. JEOR: SCORE: 1182.88

## 2020-02-07 ENCOUNTER — OFFICE VISIT - HEALTHEAST (OUTPATIENT)
Dept: PHARMACY | Facility: CLINIC | Age: 40
End: 2020-02-07

## 2020-02-07 ENCOUNTER — AMBULATORY - HEALTHEAST (OUTPATIENT)
Dept: LAB | Facility: CLINIC | Age: 40
End: 2020-02-07

## 2020-02-07 DIAGNOSIS — Z78.9 USES BIRTH CONTROL: ICD-10-CM

## 2020-02-07 DIAGNOSIS — E78.5 DYSLIPIDEMIA: ICD-10-CM

## 2020-02-07 DIAGNOSIS — K21.9 GASTROESOPHAGEAL REFLUX DISEASE WITHOUT ESOPHAGITIS: ICD-10-CM

## 2020-02-07 DIAGNOSIS — E11.9 TYPE 2 DIABETES MELLITUS WITHOUT COMPLICATION, WITHOUT LONG-TERM CURRENT USE OF INSULIN (H): ICD-10-CM

## 2020-02-07 LAB
CHOLEST SERPL-MCNC: 182 MG/DL
FASTING STATUS PATIENT QL REPORTED: NO
HBA1C MFR BLD: 8.2 % (ref 3.5–6)
HDLC SERPL-MCNC: 43 MG/DL
LDLC SERPL CALC-MCNC: 67 MG/DL
TRIGL SERPL-MCNC: 361 MG/DL

## 2020-02-12 ENCOUNTER — COMMUNICATION - HEALTHEAST (OUTPATIENT)
Dept: FAMILY MEDICINE | Facility: CLINIC | Age: 40
End: 2020-02-12

## 2020-02-26 ENCOUNTER — COMMUNICATION - HEALTHEAST (OUTPATIENT)
Dept: FAMILY MEDICINE | Facility: CLINIC | Age: 40
End: 2020-02-26

## 2020-02-26 DIAGNOSIS — E11.9 TYPE 2 DIABETES MELLITUS WITHOUT COMPLICATION, WITHOUT LONG-TERM CURRENT USE OF INSULIN (H): ICD-10-CM

## 2020-02-28 ENCOUNTER — OFFICE VISIT - HEALTHEAST (OUTPATIENT)
Dept: FAMILY MEDICINE | Facility: CLINIC | Age: 40
End: 2020-02-28

## 2020-02-28 ENCOUNTER — COMMUNICATION - HEALTHEAST (OUTPATIENT)
Dept: SCHEDULING | Facility: CLINIC | Age: 40
End: 2020-02-28

## 2020-02-28 DIAGNOSIS — E08.00 DIABETES MELLITUS DUE TO UNDERLYING CONDITION WITH HYPEROSMOLARITY WITHOUT COMA, WITHOUT LONG-TERM CURRENT USE OF INSULIN (H): ICD-10-CM

## 2020-02-28 DIAGNOSIS — K21.9 GASTROESOPHAGEAL REFLUX DISEASE WITHOUT ESOPHAGITIS: ICD-10-CM

## 2020-02-28 ASSESSMENT — MIFFLIN-ST. JEOR: SCORE: 1199.32

## 2020-03-06 ENCOUNTER — OFFICE VISIT - HEALTHEAST (OUTPATIENT)
Dept: PHARMACY | Facility: CLINIC | Age: 40
End: 2020-03-06

## 2020-03-06 ENCOUNTER — OFFICE VISIT - HEALTHEAST (OUTPATIENT)
Dept: FAMILY MEDICINE | Facility: CLINIC | Age: 40
End: 2020-03-06

## 2020-03-06 DIAGNOSIS — Z32.01 PREGNANCY TEST POSITIVE: ICD-10-CM

## 2020-03-06 DIAGNOSIS — E11.9 TYPE 2 DIABETES MELLITUS WITHOUT COMPLICATION, WITHOUT LONG-TERM CURRENT USE OF INSULIN (H): ICD-10-CM

## 2020-03-06 DIAGNOSIS — E78.5 DYSLIPIDEMIA: ICD-10-CM

## 2020-03-06 DIAGNOSIS — Z78.9 USES BIRTH CONTROL: ICD-10-CM

## 2020-03-06 DIAGNOSIS — K21.9 GASTROESOPHAGEAL REFLUX DISEASE WITHOUT ESOPHAGITIS: ICD-10-CM

## 2020-03-06 LAB — HCG UR QL: POSITIVE

## 2020-03-06 ASSESSMENT — MIFFLIN-ST. JEOR: SCORE: 1176.08

## 2020-03-11 ENCOUNTER — HOSPITAL ENCOUNTER (OUTPATIENT)
Dept: ULTRASOUND IMAGING | Facility: HOSPITAL | Age: 40
Discharge: HOME | End: 2020-03-11
Attending: FAMILY MEDICINE

## 2020-03-24 ENCOUNTER — COMMUNICATION - HEALTHEAST (OUTPATIENT)
Dept: FAMILY MEDICINE | Facility: CLINIC | Age: 40
End: 2020-03-24

## 2020-03-25 ENCOUNTER — OFFICE VISIT - HEALTHEAST (OUTPATIENT)
Dept: PHARMACY | Facility: CLINIC | Age: 40
End: 2020-03-25

## 2020-03-25 ENCOUNTER — PRENATAL OFFICE VISIT - HEALTHEAST (OUTPATIENT)
Dept: FAMILY MEDICINE | Facility: CLINIC | Age: 40
End: 2020-03-25

## 2020-03-25 ENCOUNTER — RECORDS - HEALTHEAST (OUTPATIENT)
Dept: ADMINISTRATIVE | Facility: OTHER | Age: 40
End: 2020-03-25

## 2020-03-25 DIAGNOSIS — Z32.01 PREGNANCY TEST POSITIVE: ICD-10-CM

## 2020-03-25 DIAGNOSIS — O09.529 SUPERVISION OF HIGH-RISK PREGNANCY OF ELDERLY MULTIGRAVIDA: ICD-10-CM

## 2020-03-25 DIAGNOSIS — E11.9 TYPE 2 DIABETES MELLITUS WITHOUT COMPLICATION, WITHOUT LONG-TERM CURRENT USE OF INSULIN (H): ICD-10-CM

## 2020-03-25 DIAGNOSIS — M19.90 ARTHRITIS: ICD-10-CM

## 2020-03-25 LAB
ALBUMIN UR-MCNC: NEGATIVE MG/DL
AMPHETAMINES UR QL SCN: NORMAL
APPEARANCE UR: CLEAR
BARBITURATES UR QL: NORMAL
BASOPHILS # BLD AUTO: 0 THOU/UL (ref 0–0.2)
BASOPHILS NFR BLD AUTO: 0 % (ref 0–2)
BENZODIAZ UR QL: NORMAL
BILIRUB UR QL STRIP: NEGATIVE
CANNABINOIDS UR QL SCN: NORMAL
COCAINE UR QL: NORMAL
COLOR UR AUTO: YELLOW
CREAT UR-MCNC: 51.9 MG/DL
CREAT UR-MCNC: 51.9 MG/DL
EOSINOPHIL # BLD AUTO: 0.1 THOU/UL (ref 0–0.4)
EOSINOPHIL NFR BLD AUTO: 1 % (ref 0–6)
ERYTHROCYTE [DISTWIDTH] IN BLOOD BY AUTOMATED COUNT: 11.9 % (ref 11–14.5)
GLUCOSE UR STRIP-MCNC: ABNORMAL MG/DL
HBA1C MFR BLD: 7.4 % (ref 3.5–6)
HCT VFR BLD AUTO: 39.3 % (ref 35–47)
HGB BLD-MCNC: 13 G/DL (ref 12–16)
HGB UR QL STRIP: NEGATIVE
HIV 1+2 AB+HIV1 P24 AG SERPL QL IA: NEGATIVE
KETONES UR STRIP-MCNC: NEGATIVE MG/DL
LEUKOCYTE ESTERASE UR QL STRIP: NEGATIVE
LYMPHOCYTES # BLD AUTO: 2.1 THOU/UL (ref 0.8–4.4)
LYMPHOCYTES NFR BLD AUTO: 21 % (ref 20–40)
MCH RBC QN AUTO: 30.4 PG (ref 27–34)
MCHC RBC AUTO-ENTMCNC: 33.1 G/DL (ref 32–36)
MCV RBC AUTO: 92 FL (ref 80–100)
MONOCYTES # BLD AUTO: 0.6 THOU/UL (ref 0–0.9)
MONOCYTES NFR BLD AUTO: 6 % (ref 2–10)
NEUTROPHILS # BLD AUTO: 7.3 THOU/UL (ref 2–7.7)
NEUTROPHILS NFR BLD AUTO: 72 % (ref 50–70)
NITRATE UR QL: NEGATIVE
OPIATES UR QL SCN: NORMAL
OXYCODONE UR QL: NORMAL
PCP UR QL SCN: NORMAL
PH UR STRIP: 7 [PH] (ref 5–8)
PLATELET # BLD AUTO: 243 THOU/UL (ref 140–440)
PMV BLD AUTO: 10.2 FL (ref 8.5–12.5)
PROTEIN, RANDOM URINE - HISTORICAL: 8 MG/DL
PROTEIN/CREAT RATIO, RANDOM UR: 0.15
RBC # BLD AUTO: 4.27 MILL/UL (ref 3.8–5.4)
SP GR UR STRIP: 1.02 (ref 1–1.03)
UROBILINOGEN UR STRIP-ACNC: ABNORMAL
WBC: 10.2 THOU/UL (ref 4–11)

## 2020-03-25 ASSESSMENT — MIFFLIN-ST. JEOR: SCORE: 1189.68

## 2020-03-26 LAB
ABO/RH(D): NORMAL
ABORH REPEAT: NORMAL
ANTIBODY SCREEN: NEGATIVE
BACTERIA SPEC CULT: NO GROWTH
HBV SURFACE AG SERPL QL IA: NEGATIVE
T PALLIDUM AB SER QL: NEGATIVE

## 2020-03-27 ENCOUNTER — COMMUNICATION - HEALTHEAST (OUTPATIENT)
Dept: MATERNAL FETAL MEDICINE | Facility: HOSPITAL | Age: 40
End: 2020-03-27

## 2020-03-27 ENCOUNTER — TRANSCRIBE ORDERS (OUTPATIENT)
Dept: MATERNAL FETAL MEDICINE | Facility: CLINIC | Age: 40
End: 2020-03-27

## 2020-03-27 DIAGNOSIS — O26.90 PREGNANCY RELATED CONDITION, ANTEPARTUM: Primary | ICD-10-CM

## 2020-03-27 LAB
C TRACH DNA SPEC QL PROBE+SIG AMP: NEGATIVE
COLLECTION METHOD: NORMAL
LEAD BLD-MCNC: NORMAL UG/DL
LEAD BLDV-MCNC: <2 UG/DL (ref 0–4.9)
N GONORRHOEA DNA SPEC QL NAA+PROBE: NEGATIVE
RUBV IGG SERPL QL IA: POSITIVE

## 2020-04-01 ENCOUNTER — OFFICE VISIT - HEALTHEAST (OUTPATIENT)
Dept: EDUCATION SERVICES | Facility: CLINIC | Age: 40
End: 2020-04-01

## 2020-04-01 ENCOUNTER — COMMUNICATION - HEALTHEAST (OUTPATIENT)
Dept: ADMINISTRATIVE | Facility: CLINIC | Age: 40
End: 2020-04-01

## 2020-04-01 ENCOUNTER — OFFICE VISIT - HEALTHEAST (OUTPATIENT)
Dept: FAMILY MEDICINE | Facility: CLINIC | Age: 40
End: 2020-04-01

## 2020-04-01 ENCOUNTER — COMMUNICATION - HEALTHEAST (OUTPATIENT)
Dept: EDUCATION SERVICES | Facility: CLINIC | Age: 40
End: 2020-04-01

## 2020-04-01 ENCOUNTER — COMMUNICATION - HEALTHEAST (OUTPATIENT)
Dept: MATERNAL FETAL MEDICINE | Facility: HOSPITAL | Age: 40
End: 2020-04-01

## 2020-04-01 DIAGNOSIS — O24.312 PRE-EXISTING DIABETES MELLITUS DURING PREGNANCY IN SECOND TRIMESTER: ICD-10-CM

## 2020-04-01 DIAGNOSIS — O09.529 HIGH-RISK PREGNANCY, ELDERLY MULTIGRAVIDA, UNSPECIFIED TRIMESTER: ICD-10-CM

## 2020-04-01 DIAGNOSIS — E08.00 DIABETES MELLITUS DUE TO UNDERLYING CONDITION WITH HYPEROSMOLARITY WITHOUT COMA, WITHOUT LONG-TERM CURRENT USE OF INSULIN (H): ICD-10-CM

## 2020-04-03 ENCOUNTER — PRE VISIT (OUTPATIENT)
Dept: MATERNAL FETAL MEDICINE | Facility: CLINIC | Age: 40
End: 2020-04-03

## 2020-04-03 NOTE — PROGRESS NOTES
"Maternal-Fetal Medicine Telephone/Virtual Consultation    Stacey Melendez  : 1980  MRN: [id number]    The patient has been notified of following:     \"This telephone visit will be conducted via a call between you and your physician/provider. We have found that certain health care needs can be provided without the need for a physical exam.  This service lets us provide the care you need with a short phone conversation.  If a prescription is necessary we can send it directly to your pharmacy.  If lab work is needed we can place an order for that and you can then stop by our lab to have the test done at a later time.    If during the course of the call the physician/provider feels a telephone visit is not appropriate, you will not be charged for this service.\"     REFERRAL:  Stacey Melendez is a 39 year old sent by Dr. Vasquez  for MFM consultation.    HPI:  Stacey Melendez is a 39 year old  at 12w6d by LMP c/w 9w2d ultrasound on the phone today for MFM consultation regarding her T2DM.    The patient was diagnosed with type 2 DM 2 years ago. Prior to pregnancy she used to be on metformin 1000 mg twice a day and glipizide 10 mg twice a day.  She was last contacted by endocrinology for a virtual visit on 2020 and her medications were switched to metformin 1000 once a day, Lantus 12 units at night, and Humolog 10 units before each meal.  She has not however started the insulin.     Her last hemoglobin A1c in  was 7.3% prior to that in  was 8.3%.  She does not have retinopathy based on the last eye exam in  and she does not have nephropathy based on the urine protein creatinine testing that was done recently in .  Based on the last virtual visit with endocrinology her fasting sugar has been ranged between 120 and 150, and her post prandial levels have been in the 200s with the highest being 330.    Her [facility] labs were noted for normal CBC and normal UPC.  No recent basic panel " has been done.  She has been told to start low-dose aspirin at 12 weeks gestation. She has not started ASA.    Obstetrics History:  6 Current   5 Term  6 lb 9.8 oz (3 kg) F Vag-Spont MARY ANN   4 Term  6 lb 9.8 oz (3 kg) F Vag-Spont MARY ANN   3 Term  6 lb 9.8 oz (3 kg) M Vag-Spont MARY ANN   Complications: Hemorrhage   2 Term  6 lb 9.8 oz (3 kg) F Vag-Spont MARY ANN   1 Term  6 lb 9.8 oz (3 kg) M Vag-Spont DEC.  at age 4 in [place]      Gynecologic History:  - Denies any history of abnormal pap smears  - Denies prior cervical surgery or procedures  - Denies any history of frequent UTIs, vaginal infections, or STIs    Past Medical History:  T2DM  Hypertriglyceridemia    Past Surgical History:  No past surgical history on file.    Current Medications:  Prenatal vitamins   Aspirin   Insulin  Metformin    Allergies:  Patient has no allergy information on record.    Social History:   Denies use of alcohol, drugs or smoking.    Family History:  Diabetes Mother       Hyperlipidemia Mother       Breast cancer Neg Hx       Cancer Neg Hx       Hypertension Neg Hx         Denies history of genetic disorders, preeclampsia, thromboembolic disease, bleeding disorders, mental retardation    Genetic Testing:   Low risk NIPT    Ultrasound:  None at [facility]    ASSESSMENT:  Stacey Melendez is a 39 year old  at 12w6d by LMP c/w 9w2d ultrasound on the phone today for M consultation regarding her T2DM.    We discussed the implications of T2DM on pregnancy and pregnancy on her DM status. We discussed that pre gestational diabetes has been associated with obstetric, fetal, and  complications. In terms of obstetric complications they include  increased risk of fetal congenital malformations, intrauterine fetal demise, fetal macrosomia,  hypoglycemia or jaundice,  birth, need for an operative or surgical delivery and maternal ketoacidosis or preeclampsia.  We discussed that many of these risks can  be reduced with tight glycemic control and reduction of hemoglobin A1C to < 6%. A1C correlates directly with frequency of anomalies. In women with A1C <6% the risk of anomalies is similar to pregnant women with no DM. While it is as high as 20-25% with A1C of 10%. The  consequences of poorly controlled pregestational diabetes mellitus during pregnancy include profound hypoglycemia, a higher rate of respiratory distress syndrome, polycythemia, organomegaly, electrolyte disturbances, and hyperbilirubinemia.     Given that the prevalence of neural tube defects (NTDs) is higher in women with pregestational diabetes mellitus. NTDs occurred in 2 percent of pregnancies complicated by diabetes versus 0.1 to 0.2 percent of the general population therefore would recommend screening for it via Quad screen or MSAFP. There is also an increased risk for cardiac anomalies.    We also discussed that pregnancies complicated by maternal diabetes are commonly associated with accelerated growth , but are also at increased risk of impaired fetal growth, therefore would monitor fetal growth via serial growth US every 4 weeks or more frequently as clinically indicated     Recommendations:    Prenatal  - Continue metformin and insulin therapy with the goal of maintaining euglycemia..Goals for glucose monitoring are fasting 70-95 mg/dL, 2 hour postprandial </= 120  mg/dL.  Recommend capillary blood glucose monitoring at least 4 times a day (fasting, 2 hours after each meal). This can be arranged with Endocrinology.  - Compliance with endocrinology visit follow ups is essential given poor control at this time  - Screen for neural tube defects via MSAFP test  - Comprehensive eye examination by an ophthalmologist to detect retinopathy .   - Blood pressure monitoring recommend at least every 2 weeks from 20 to 30 weeks gestational age and then weekly until delivery. Maintain BPS < 160/100 in pregnancy, The use of labetalol, hydralazine,  or Nefedipine are safe in pregnancy, the use of Atenolol, ACE inhibitors or ARBs are contraindicated in pregnancy.    Antepartum surveillance:  - Fetal echocardiogram at 22-24 weeks.    - Detailed anatomy ultrasound @18-20 weeks followed by serial growth ultrasound every 4 weeks or more frequently as clinically indicated.  - Twice weekly  surveillance (BPP ) starting @32 weeks    Delivery planning   - Mode of delivery may be altered based on estimated fetal weight near EDC, with  recommended for fetal weight > 4250g.   - Avoiding prolongation of pregnancy beyond 39 weeks. Can be considered as early as 36 weeks for uncontrolled DM.    Total time spent in counseling was 15 minutes all for plan of care discussion. A copy of this consult was sent to your office    Thank you for allowing us to participate in the care of your patient. Please do not hesitate to contact us if you have further questions regarding the management of your patient.     Ivan Phoenix MD  Maternal-Fetal Medicine

## 2020-04-07 ENCOUNTER — VIRTUAL VISIT (OUTPATIENT)
Dept: MATERNAL FETAL MEDICINE | Facility: CLINIC | Age: 40
End: 2020-04-07
Attending: FAMILY MEDICINE
Payer: COMMERCIAL

## 2020-04-07 ENCOUNTER — RECORDS - HEALTHEAST (OUTPATIENT)
Dept: ADMINISTRATIVE | Facility: OTHER | Age: 40
End: 2020-04-07

## 2020-04-07 DIAGNOSIS — O24.119 TYPE 2 DIABETES MELLITUS COMPLICATING PREGNANCY, ANTEPARTUM: ICD-10-CM

## 2020-04-07 RX ORDER — PRENATAL VIT/IRON FUM/FOLIC AC 27MG-0.8MG
1 TABLET ORAL DAILY
COMMUNITY
End: 2021-09-29

## 2020-04-07 NOTE — PROGRESS NOTES
Phone consult with pt thru  on phone. Pt states she is doing ok pregnancy wise. States she is taking her blood sugars in the morning and her fasting have been 120-150 and 3 hours in the 200's. Pt states she has not started to take her insulin at this time. States she does not know how much she is to take. Pt encouraged to call her Endocrinologist. Pt had consult done with Dr. Phoenix. See note in epic for today's consult discussion and recommendations. Questions answered. Pt will return for L2 at appropriate time. Sarah Fontaine RN

## 2020-04-09 ENCOUNTER — OFFICE VISIT - HEALTHEAST (OUTPATIENT)
Dept: ENDOCRINOLOGY | Facility: CLINIC | Age: 40
End: 2020-04-09

## 2020-04-09 DIAGNOSIS — O24.112 PRE-EXISTING TYPE 2 DIABETES MELLITUS DURING PREGNANCY IN SECOND TRIMESTER: ICD-10-CM

## 2020-04-16 ENCOUNTER — OFFICE VISIT - HEALTHEAST (OUTPATIENT)
Dept: ENDOCRINOLOGY | Facility: CLINIC | Age: 40
End: 2020-04-16

## 2020-04-16 DIAGNOSIS — O24.112 PRE-EXISTING TYPE 2 DIABETES MELLITUS DURING PREGNANCY IN SECOND TRIMESTER: ICD-10-CM

## 2020-04-23 ENCOUNTER — OFFICE VISIT - HEALTHEAST (OUTPATIENT)
Dept: ENDOCRINOLOGY | Facility: CLINIC | Age: 40
End: 2020-04-23

## 2020-04-23 ENCOUNTER — COMMUNICATION - HEALTHEAST (OUTPATIENT)
Dept: ENDOCRINOLOGY | Facility: CLINIC | Age: 40
End: 2020-04-23

## 2020-04-23 DIAGNOSIS — O24.112 PRE-EXISTING TYPE 2 DIABETES MELLITUS DURING PREGNANCY IN SECOND TRIMESTER: ICD-10-CM

## 2020-04-24 ENCOUNTER — PRENATAL OFFICE VISIT - HEALTHEAST (OUTPATIENT)
Dept: FAMILY MEDICINE | Facility: CLINIC | Age: 40
End: 2020-04-24

## 2020-04-24 DIAGNOSIS — G56.03 BILATERAL CARPAL TUNNEL SYNDROME: ICD-10-CM

## 2020-04-24 DIAGNOSIS — K21.00 GASTROESOPHAGEAL REFLUX DISEASE WITH ESOPHAGITIS: ICD-10-CM

## 2020-04-24 DIAGNOSIS — M19.90 ARTHRITIS: ICD-10-CM

## 2020-04-24 DIAGNOSIS — O09.529 HIGH-RISK PREGNANCY, ELDERLY MULTIGRAVIDA, UNSPECIFIED TRIMESTER: ICD-10-CM

## 2020-04-24 DIAGNOSIS — O24.112 PRE-EXISTING TYPE 2 DIABETES MELLITUS DURING PREGNANCY IN SECOND TRIMESTER: ICD-10-CM

## 2020-04-24 ASSESSMENT — MIFFLIN-ST. JEOR: SCORE: 1203.29

## 2020-04-28 LAB
# FETUSES US: NORMAL
AFP MOM SERPL: 1.22
AFP SERPL-MCNC: 30 NG/ML
AGE - REPORTED: 39.4 YR
CURRENT SMOKER: NO
FAMILY MEMBER DISEASES HX: NO
GA METHOD: NORMAL
GA: NORMAL WK
HCG MOM SERPL: 0.47
HCG SERPL-ACNC: NORMAL IU/L
HX OF HEREDITARY DISORDERS: NO
IDDM PATIENT QL: YES
INHIBIN A MOM SERPL: 0.58
INHIBIN A SERPL-MCNC: 87 PG/ML
INTEGRATED SCN PATIENT-IMP: NORMAL
PATHOLOGY STUDY: NORMAL
SPECIMEN DRAWN SERPL: NORMAL
U ESTRIOL MOM SERPL: 0.88
U ESTRIOL SERPL-MCNC: 0.65 NG/ML

## 2020-04-29 ENCOUNTER — AMBULATORY - HEALTHEAST (OUTPATIENT)
Dept: MATERNAL FETAL MEDICINE | Facility: HOSPITAL | Age: 40
End: 2020-04-29

## 2020-04-29 DIAGNOSIS — O26.90 PREGNANCY, ANTEPARTUM, COMPLICATIONS: ICD-10-CM

## 2020-05-07 ENCOUNTER — COMMUNICATION - HEALTHEAST (OUTPATIENT)
Dept: ENDOCRINOLOGY | Facility: CLINIC | Age: 40
End: 2020-05-07

## 2020-05-07 ENCOUNTER — OFFICE VISIT - HEALTHEAST (OUTPATIENT)
Dept: ENDOCRINOLOGY | Facility: CLINIC | Age: 40
End: 2020-05-07

## 2020-05-07 DIAGNOSIS — O24.112 PRE-EXISTING TYPE 2 DIABETES MELLITUS DURING PREGNANCY IN SECOND TRIMESTER: ICD-10-CM

## 2020-05-21 ENCOUNTER — COMMUNICATION - HEALTHEAST (OUTPATIENT)
Dept: ENDOCRINOLOGY | Facility: CLINIC | Age: 40
End: 2020-05-21

## 2020-05-21 ENCOUNTER — OFFICE VISIT - HEALTHEAST (OUTPATIENT)
Dept: ENDOCRINOLOGY | Facility: CLINIC | Age: 40
End: 2020-05-21

## 2020-05-21 DIAGNOSIS — O24.112 PRE-EXISTING TYPE 2 DIABETES MELLITUS DURING PREGNANCY IN SECOND TRIMESTER: ICD-10-CM

## 2020-05-22 ENCOUNTER — PRENATAL OFFICE VISIT - HEALTHEAST (OUTPATIENT)
Dept: FAMILY MEDICINE | Facility: CLINIC | Age: 40
End: 2020-05-22

## 2020-05-22 ENCOUNTER — AMBULATORY - HEALTHEAST (OUTPATIENT)
Dept: MATERNAL FETAL MEDICINE | Facility: HOSPITAL | Age: 40
End: 2020-05-22

## 2020-05-22 DIAGNOSIS — O24.112 PRE-EXISTING TYPE 2 DIABETES MELLITUS DURING PREGNANCY IN SECOND TRIMESTER: ICD-10-CM

## 2020-05-22 DIAGNOSIS — O09.529 HIGH-RISK PREGNANCY, ELDERLY MULTIGRAVIDA, UNSPECIFIED TRIMESTER: ICD-10-CM

## 2020-05-22 LAB
ALT SERPL W P-5'-P-CCNC: 10 U/L (ref 0–45)
APTT PPP: 28 SECONDS (ref 24–37)
AST SERPL W P-5'-P-CCNC: 15 U/L (ref 0–40)
CREAT SERPL-MCNC: 0.62 MG/DL (ref 0.6–1.1)
CREAT UR-MCNC: 173.1 MG/DL
ERYTHROCYTE [DISTWIDTH] IN BLOOD BY AUTOMATED COUNT: 11.5 % (ref 11–14.5)
GFR SERPL CREATININE-BSD FRML MDRD: >60 ML/MIN/1.73M2
HCT VFR BLD AUTO: 35.9 % (ref 35–47)
HGB BLD-MCNC: 12.3 G/DL (ref 12–16)
INR PPP: 0.97 (ref 0.9–1.1)
MCH RBC QN AUTO: 31.8 PG (ref 27–34)
MCHC RBC AUTO-ENTMCNC: 34.2 G/DL (ref 32–36)
MCV RBC AUTO: 93 FL (ref 80–100)
PLATELET # BLD AUTO: 224 THOU/UL (ref 140–440)
PMV BLD AUTO: 7.6 FL (ref 7–10)
PROTEIN, RANDOM URINE - HISTORICAL: 16 MG/DL
PROTEIN/CREAT RATIO, RANDOM UR: 0.09
RBC # BLD AUTO: 3.86 MILL/UL (ref 3.8–5.4)
URATE SERPL-MCNC: 3.4 MG/DL (ref 2–7.5)
WBC: 10.3 THOU/UL (ref 4–11)

## 2020-05-22 ASSESSMENT — MIFFLIN-ST. JEOR: SCORE: 1198.76

## 2020-05-27 ENCOUNTER — OFFICE VISIT - HEALTHEAST (OUTPATIENT)
Dept: MATERNAL FETAL MEDICINE | Facility: HOSPITAL | Age: 40
End: 2020-05-27

## 2020-05-27 ENCOUNTER — RECORDS - HEALTHEAST (OUTPATIENT)
Dept: ULTRASOUND IMAGING | Facility: HOSPITAL | Age: 40
End: 2020-05-27

## 2020-05-27 ENCOUNTER — RECORDS - HEALTHEAST (OUTPATIENT)
Dept: ADMINISTRATIVE | Facility: OTHER | Age: 40
End: 2020-05-27

## 2020-05-27 DIAGNOSIS — O26.90 PREGNANCY RELATED CONDITIONS, UNSPECIFIED, UNSPECIFIED TRIMESTER: ICD-10-CM

## 2020-05-27 DIAGNOSIS — O24.119 TYPE 2 DIABETES MELLITUS DURING PREGNANCY: ICD-10-CM

## 2020-05-27 DIAGNOSIS — Z79.4 PRE-EXISTING TYPE 2 INSULIN TREATED DIABETES MELLITUS DURING PREGNANCY (H): Primary | ICD-10-CM

## 2020-05-27 DIAGNOSIS — O24.119 PRE-EXISTING TYPE 2 INSULIN TREATED DIABETES MELLITUS DURING PREGNANCY (H): Primary | ICD-10-CM

## 2020-05-28 ENCOUNTER — OFFICE VISIT - HEALTHEAST (OUTPATIENT)
Dept: ENDOCRINOLOGY | Facility: CLINIC | Age: 40
End: 2020-05-28

## 2020-05-28 DIAGNOSIS — O24.312 PRE-EXISTING DIABETES MELLITUS DURING PREGNANCY IN SECOND TRIMESTER: ICD-10-CM

## 2020-06-11 ENCOUNTER — COMMUNICATION - HEALTHEAST (OUTPATIENT)
Dept: ENDOCRINOLOGY | Facility: CLINIC | Age: 40
End: 2020-06-11

## 2020-06-11 ENCOUNTER — OFFICE VISIT - HEALTHEAST (OUTPATIENT)
Dept: ENDOCRINOLOGY | Facility: CLINIC | Age: 40
End: 2020-06-11

## 2020-06-11 DIAGNOSIS — O24.112 PRE-EXISTING TYPE 2 DIABETES MELLITUS DURING PREGNANCY IN SECOND TRIMESTER: ICD-10-CM

## 2020-06-12 ENCOUNTER — PRENATAL OFFICE VISIT - HEALTHEAST (OUTPATIENT)
Dept: FAMILY MEDICINE | Facility: CLINIC | Age: 40
End: 2020-06-12

## 2020-06-12 DIAGNOSIS — O09.529 HIGH-RISK PREGNANCY, ELDERLY MULTIGRAVIDA, UNSPECIFIED TRIMESTER: ICD-10-CM

## 2020-06-12 DIAGNOSIS — O24.112 PRE-EXISTING TYPE 2 DIABETES MELLITUS DURING PREGNANCY IN SECOND TRIMESTER: ICD-10-CM

## 2020-06-12 ASSESSMENT — MIFFLIN-ST. JEOR: SCORE: 1211.23

## 2020-06-16 ENCOUNTER — COMMUNICATION - HEALTHEAST (OUTPATIENT)
Dept: FAMILY MEDICINE | Facility: CLINIC | Age: 40
End: 2020-06-16

## 2020-06-16 DIAGNOSIS — E11.9 TYPE 2 DIABETES MELLITUS WITHOUT COMPLICATION, WITHOUT LONG-TERM CURRENT USE OF INSULIN (H): ICD-10-CM

## 2020-06-18 ENCOUNTER — OFFICE VISIT - HEALTHEAST (OUTPATIENT)
Dept: EDUCATION SERVICES | Facility: CLINIC | Age: 40
End: 2020-06-18

## 2020-06-18 DIAGNOSIS — O24.312 PRE-EXISTING DIABETES MELLITUS DURING PREGNANCY IN SECOND TRIMESTER: ICD-10-CM

## 2020-06-23 ENCOUNTER — HOSPITAL ENCOUNTER (OUTPATIENT)
Dept: CARDIOLOGY | Facility: CLINIC | Age: 40
Discharge: HOME | End: 2020-06-23
Attending: OBSTETRICS & GYNECOLOGY | Admitting: OBSTETRICS & GYNECOLOGY
Payer: COMMERCIAL

## 2020-06-23 ENCOUNTER — RECORDS - HEALTHEAST (OUTPATIENT)
Dept: ADMINISTRATIVE | Facility: OTHER | Age: 40
End: 2020-06-23

## 2020-06-23 DIAGNOSIS — O24.119 PRE-EXISTING TYPE 2 INSULIN TREATED DIABETES MELLITUS DURING PREGNANCY (H): ICD-10-CM

## 2020-06-23 DIAGNOSIS — Z79.4 PRE-EXISTING TYPE 2 INSULIN TREATED DIABETES MELLITUS DURING PREGNANCY (H): ICD-10-CM

## 2020-06-23 PROCEDURE — 76825 ECHO EXAM OF FETAL HEART: CPT

## 2020-06-23 NOTE — PROGRESS NOTES
[facility] Fetal Consult Note    Patient:  Stacey Melendez MRN:  [id number]   YOB: 1980 Age:  40 year old   Date of Visit:  2020 PCP:  Carlos Noel     Dear Dr. Sierra:    I had the pleasure of seeing Stacey Melendez at the [facility] on 2020 in fetal cardiology consultation for fetal echocardiogram results. She presented today accompanied by herself. As you know, she is a 40 year old  at 23w6d who presented for fetal echocardiogram today because of diabetes mellitus type II.    I performed and interpreted the fetal echocardiogram today, which demonstrated Normal fetal cardiac anatomy. Normal fetal intracardiac connections. Normal right and left ventricular size and function. No hydrops. Fetal heart rate is regular at 144 bpm.    I reviewed the echo findings today with Stacey Melendez. She is aware that the study was within normal limits with no major cardiac abnormalities. She is aware of the general limitations of fetal echocardiography. No additional fetal echocardiograms are recommended. No  cardiac follow-up is required.     Thank you for allowing me to participate in [facility]'s care. Please do not hesitate to contact me with questions or concerns.    This visit was separate from the performance and interpretation of the ultrasound. The majority of the time (>50%) was spent in counseling and coordination of care. I spent approximately 10 minutes in face-to-face time reviewing the above considerations.    Rashmi Randhawa M.D.  Pediatric Cardiology  [address], 5th floor, [facility] [address]  Phone [phone]  Fax [phone]

## 2020-06-25 ENCOUNTER — AMBULATORY - HEALTHEAST (OUTPATIENT)
Dept: ENDOCRINOLOGY | Facility: CLINIC | Age: 40
End: 2020-06-25

## 2020-06-25 ENCOUNTER — OFFICE VISIT - HEALTHEAST (OUTPATIENT)
Dept: ENDOCRINOLOGY | Facility: CLINIC | Age: 40
End: 2020-06-25

## 2020-06-25 DIAGNOSIS — O24.112 PRE-EXISTING TYPE 2 DIABETES MELLITUS DURING PREGNANCY IN SECOND TRIMESTER: ICD-10-CM

## 2020-06-29 ENCOUNTER — AMBULATORY - HEALTHEAST (OUTPATIENT)
Dept: EDUCATION SERVICES | Facility: CLINIC | Age: 40
End: 2020-06-29

## 2020-06-29 ENCOUNTER — AMBULATORY - HEALTHEAST (OUTPATIENT)
Dept: ENDOCRINOLOGY | Facility: CLINIC | Age: 40
End: 2020-06-29

## 2020-06-29 ENCOUNTER — COMMUNICATION - HEALTHEAST (OUTPATIENT)
Dept: ENDOCRINOLOGY | Facility: CLINIC | Age: 40
End: 2020-06-29

## 2020-06-29 DIAGNOSIS — O24.112 PRE-EXISTING TYPE 2 DIABETES MELLITUS DURING PREGNANCY IN SECOND TRIMESTER: ICD-10-CM

## 2020-07-02 ENCOUNTER — OFFICE VISIT - HEALTHEAST (OUTPATIENT)
Dept: ENDOCRINOLOGY | Facility: CLINIC | Age: 40
End: 2020-07-02

## 2020-07-02 ENCOUNTER — COMMUNICATION - HEALTHEAST (OUTPATIENT)
Dept: ENDOCRINOLOGY | Facility: CLINIC | Age: 40
End: 2020-07-02

## 2020-07-02 DIAGNOSIS — O24.112 PRE-EXISTING TYPE 2 DIABETES MELLITUS DURING PREGNANCY IN SECOND TRIMESTER: ICD-10-CM

## 2020-07-10 ENCOUNTER — OFFICE VISIT - HEALTHEAST (OUTPATIENT)
Dept: MATERNAL FETAL MEDICINE | Facility: HOSPITAL | Age: 40
End: 2020-07-10

## 2020-07-10 ENCOUNTER — PRENATAL OFFICE VISIT - HEALTHEAST (OUTPATIENT)
Dept: FAMILY MEDICINE | Facility: CLINIC | Age: 40
End: 2020-07-10

## 2020-07-10 ENCOUNTER — RECORDS - HEALTHEAST (OUTPATIENT)
Dept: ULTRASOUND IMAGING | Facility: HOSPITAL | Age: 40
End: 2020-07-10

## 2020-07-10 ENCOUNTER — RECORDS - HEALTHEAST (OUTPATIENT)
Dept: ADMINISTRATIVE | Facility: OTHER | Age: 40
End: 2020-07-10

## 2020-07-10 DIAGNOSIS — G56.03 BILATERAL CARPAL TUNNEL SYNDROME: ICD-10-CM

## 2020-07-10 DIAGNOSIS — O09.529 HIGH-RISK PREGNANCY, ELDERLY MULTIGRAVIDA, UNSPECIFIED TRIMESTER: ICD-10-CM

## 2020-07-10 DIAGNOSIS — O24.112 PRE-EXISTING TYPE 2 DIABETES MELLITUS DURING PREGNANCY IN SECOND TRIMESTER: ICD-10-CM

## 2020-07-10 DIAGNOSIS — O24.112 PREGNANCY WITH TYPE 2 DIABETES MELLITUS IN SECOND TRIMESTER: ICD-10-CM

## 2020-07-10 DIAGNOSIS — O24.119 PRE-EXISTING TYPE 2 DIABETES MELLITUS, IN PREGNANCY, UNSPECIFIED TRIMESTER: ICD-10-CM

## 2020-07-10 LAB
ALBUMIN UR-MCNC: NEGATIVE MG/DL
GLUCOSE UR STRIP-MCNC: NEGATIVE MG/DL
HGB BLD-MCNC: 12 G/DL (ref 12–16)
KETONES UR STRIP-MCNC: NEGATIVE MG/DL

## 2020-07-10 ASSESSMENT — MIFFLIN-ST. JEOR: SCORE: 1216.9

## 2020-07-11 LAB — T PALLIDUM AB SER QL: NEGATIVE

## 2020-07-13 ENCOUNTER — COMMUNICATION - HEALTHEAST (OUTPATIENT)
Dept: FAMILY MEDICINE | Facility: CLINIC | Age: 40
End: 2020-07-13

## 2020-07-13 DIAGNOSIS — E11.9 TYPE 2 DIABETES MELLITUS WITHOUT COMPLICATION, WITHOUT LONG-TERM CURRENT USE OF INSULIN (H): ICD-10-CM

## 2020-07-16 ENCOUNTER — OFFICE VISIT - HEALTHEAST (OUTPATIENT)
Dept: ENDOCRINOLOGY | Facility: CLINIC | Age: 40
End: 2020-07-16

## 2020-07-16 ENCOUNTER — OFFICE VISIT - HEALTHEAST (OUTPATIENT)
Dept: EDUCATION SERVICES | Facility: CLINIC | Age: 40
End: 2020-07-16

## 2020-07-16 DIAGNOSIS — O24.112 PRE-EXISTING TYPE 2 DIABETES MELLITUS DURING PREGNANCY IN SECOND TRIMESTER: ICD-10-CM

## 2020-07-16 DIAGNOSIS — O24.312 PRE-EXISTING DIABETES MELLITUS DURING PREGNANCY IN SECOND TRIMESTER: ICD-10-CM

## 2020-07-23 ENCOUNTER — PRENATAL OFFICE VISIT - HEALTHEAST (OUTPATIENT)
Dept: FAMILY MEDICINE | Facility: CLINIC | Age: 40
End: 2020-07-23

## 2020-07-23 DIAGNOSIS — O09.529 HIGH-RISK PREGNANCY, ELDERLY MULTIGRAVIDA, UNSPECIFIED TRIMESTER: ICD-10-CM

## 2020-07-23 DIAGNOSIS — O24.112 PRE-EXISTING TYPE 2 DIABETES MELLITUS DURING PREGNANCY IN SECOND TRIMESTER: ICD-10-CM

## 2020-07-23 DIAGNOSIS — G56.03 BILATERAL CARPAL TUNNEL SYNDROME: ICD-10-CM

## 2020-07-23 LAB
ALBUMIN UR-MCNC: NEGATIVE MG/DL
GLUCOSE UR STRIP-MCNC: ABNORMAL MG/DL
KETONES UR STRIP-MCNC: ABNORMAL MG/DL

## 2020-07-23 ASSESSMENT — MIFFLIN-ST. JEOR: SCORE: 1221.44

## 2020-07-29 ENCOUNTER — AMBULATORY - HEALTHEAST (OUTPATIENT)
Dept: ENDOCRINOLOGY | Facility: CLINIC | Age: 40
End: 2020-07-29

## 2020-07-29 ENCOUNTER — OFFICE VISIT - HEALTHEAST (OUTPATIENT)
Dept: ENDOCRINOLOGY | Facility: CLINIC | Age: 40
End: 2020-07-29

## 2020-07-29 DIAGNOSIS — O24.112 PRE-EXISTING TYPE 2 DIABETES MELLITUS DURING PREGNANCY IN SECOND TRIMESTER: ICD-10-CM

## 2020-08-07 ENCOUNTER — OFFICE VISIT - HEALTHEAST (OUTPATIENT)
Dept: MATERNAL FETAL MEDICINE | Facility: HOSPITAL | Age: 40
End: 2020-08-07

## 2020-08-07 ENCOUNTER — RECORDS - HEALTHEAST (OUTPATIENT)
Dept: ADMINISTRATIVE | Facility: OTHER | Age: 40
End: 2020-08-07

## 2020-08-07 ENCOUNTER — RECORDS - HEALTHEAST (OUTPATIENT)
Dept: ULTRASOUND IMAGING | Facility: HOSPITAL | Age: 40
End: 2020-08-07

## 2020-08-07 DIAGNOSIS — O24.113 PREGNANCY WITH TYPE 2 DIABETES MELLITUS IN THIRD TRIMESTER: ICD-10-CM

## 2020-08-07 DIAGNOSIS — O24.112 PRE-EXISTING TYPE 2 DIABETES MELLITUS, IN PREGNANCY, SECOND TRIMESTER: ICD-10-CM

## 2020-08-10 ENCOUNTER — PRENATAL OFFICE VISIT - HEALTHEAST (OUTPATIENT)
Dept: FAMILY MEDICINE | Facility: CLINIC | Age: 40
End: 2020-08-10

## 2020-08-10 DIAGNOSIS — O09.523 SUPERVISION OF ELDERLY MULTIGRAVIDA IN THIRD TRIMESTER (>=35 YEARS OLD AT TIME OF DELIVERY): ICD-10-CM

## 2020-08-10 DIAGNOSIS — O24.119 PRE-EXISTING TYPE 2 DIABETES MELLITUS DURING PREGNANCY, ANTEPARTUM: ICD-10-CM

## 2020-08-10 ASSESSMENT — MIFFLIN-ST. JEOR: SCORE: 1236.18

## 2020-08-13 ENCOUNTER — AMBULATORY - HEALTHEAST (OUTPATIENT)
Dept: EDUCATION SERVICES | Facility: CLINIC | Age: 40
End: 2020-08-13

## 2020-08-13 ENCOUNTER — OFFICE VISIT - HEALTHEAST (OUTPATIENT)
Dept: ENDOCRINOLOGY | Facility: CLINIC | Age: 40
End: 2020-08-13

## 2020-08-13 DIAGNOSIS — O24.113 PRE-EXISTING TYPE 2 DIABETES MELLITUS DURING PREGNANCY IN THIRD TRIMESTER: ICD-10-CM

## 2020-08-13 DIAGNOSIS — O24.119 PRE-EXISTING TYPE 2 DIABETES MELLITUS DURING PREGNANCY, ANTEPARTUM: ICD-10-CM

## 2020-08-20 ENCOUNTER — OFFICE VISIT - HEALTHEAST (OUTPATIENT)
Dept: ENDOCRINOLOGY | Facility: CLINIC | Age: 40
End: 2020-08-20

## 2020-08-20 DIAGNOSIS — O24.119 PRE-EXISTING TYPE 2 DIABETES MELLITUS DURING PREGNANCY, ANTEPARTUM: ICD-10-CM

## 2020-08-21 ENCOUNTER — OFFICE VISIT - HEALTHEAST (OUTPATIENT)
Dept: MATERNAL FETAL MEDICINE | Facility: HOSPITAL | Age: 40
End: 2020-08-21

## 2020-08-21 ENCOUNTER — RECORDS - HEALTHEAST (OUTPATIENT)
Dept: ULTRASOUND IMAGING | Facility: HOSPITAL | Age: 40
End: 2020-08-21

## 2020-08-21 ENCOUNTER — RECORDS - HEALTHEAST (OUTPATIENT)
Dept: ADMINISTRATIVE | Facility: OTHER | Age: 40
End: 2020-08-21

## 2020-08-21 DIAGNOSIS — O24.113 PRE-EXISTING TYPE 2 DIABETES MELLITUS, IN PREGNANCY, THIRD TRIMESTER: ICD-10-CM

## 2020-08-21 DIAGNOSIS — O24.113 PREGNANCY WITH TYPE 2 DIABETES MELLITUS IN THIRD TRIMESTER: ICD-10-CM

## 2020-08-24 ENCOUNTER — OFFICE VISIT - HEALTHEAST (OUTPATIENT)
Dept: MATERNAL FETAL MEDICINE | Facility: HOSPITAL | Age: 40
End: 2020-08-24

## 2020-08-24 ENCOUNTER — RECORDS - HEALTHEAST (OUTPATIENT)
Dept: ADMINISTRATIVE | Facility: OTHER | Age: 40
End: 2020-08-24

## 2020-08-24 ENCOUNTER — RECORDS - HEALTHEAST (OUTPATIENT)
Dept: ULTRASOUND IMAGING | Facility: HOSPITAL | Age: 40
End: 2020-08-24

## 2020-08-24 DIAGNOSIS — O24.113 PREGNANCY WITH TYPE 2 DIABETES MELLITUS IN THIRD TRIMESTER: ICD-10-CM

## 2020-08-24 DIAGNOSIS — O24.113 PRE-EXISTING TYPE 2 DIABETES MELLITUS, IN PREGNANCY, THIRD TRIMESTER: ICD-10-CM

## 2020-08-25 ENCOUNTER — PRENATAL OFFICE VISIT - HEALTHEAST (OUTPATIENT)
Dept: FAMILY MEDICINE | Facility: CLINIC | Age: 40
End: 2020-08-25

## 2020-08-25 DIAGNOSIS — O09.529 HIGH-RISK PREGNANCY, ELDERLY MULTIGRAVIDA, UNSPECIFIED TRIMESTER: ICD-10-CM

## 2020-08-25 DIAGNOSIS — O24.119 PRE-EXISTING TYPE 2 DIABETES MELLITUS DURING PREGNANCY, ANTEPARTUM: ICD-10-CM

## 2020-08-25 LAB
ALBUMIN UR-MCNC: NEGATIVE MG/DL
GLUCOSE UR STRIP-MCNC: NEGATIVE MG/DL
KETONES UR STRIP-MCNC: NEGATIVE MG/DL

## 2020-08-25 ASSESSMENT — MIFFLIN-ST. JEOR: SCORE: 1239.58

## 2020-08-26 ENCOUNTER — COMMUNICATION - HEALTHEAST (OUTPATIENT)
Dept: NURSING | Facility: CLINIC | Age: 40
End: 2020-08-26

## 2020-08-28 ENCOUNTER — RECORDS - HEALTHEAST (OUTPATIENT)
Dept: ADMINISTRATIVE | Facility: OTHER | Age: 40
End: 2020-08-28

## 2020-08-28 ENCOUNTER — RECORDS - HEALTHEAST (OUTPATIENT)
Dept: ULTRASOUND IMAGING | Facility: HOSPITAL | Age: 40
End: 2020-08-28

## 2020-08-28 ENCOUNTER — OFFICE VISIT - HEALTHEAST (OUTPATIENT)
Dept: MATERNAL FETAL MEDICINE | Facility: HOSPITAL | Age: 40
End: 2020-08-28

## 2020-08-28 DIAGNOSIS — O24.113 PRE-EXISTING TYPE 2 DIABETES MELLITUS, IN PREGNANCY, THIRD TRIMESTER: ICD-10-CM

## 2020-08-28 DIAGNOSIS — O24.113 PREGNANCY COMPLICATED BY PRE-EXISTING TYPE 2 DIABETES, THIRD TRIMESTER: ICD-10-CM

## 2020-08-31 ENCOUNTER — RECORDS - HEALTHEAST (OUTPATIENT)
Dept: ADMINISTRATIVE | Facility: OTHER | Age: 40
End: 2020-08-31

## 2020-08-31 ENCOUNTER — OFFICE VISIT - HEALTHEAST (OUTPATIENT)
Dept: MATERNAL FETAL MEDICINE | Facility: HOSPITAL | Age: 40
End: 2020-08-31

## 2020-08-31 ENCOUNTER — RECORDS - HEALTHEAST (OUTPATIENT)
Dept: ULTRASOUND IMAGING | Facility: HOSPITAL | Age: 40
End: 2020-08-31

## 2020-08-31 ENCOUNTER — COMMUNICATION - HEALTHEAST (OUTPATIENT)
Dept: NURSING | Facility: CLINIC | Age: 40
End: 2020-08-31

## 2020-08-31 DIAGNOSIS — O24.113 PRE-EXISTING TYPE 2 DIABETES MELLITUS, IN PREGNANCY, THIRD TRIMESTER: ICD-10-CM

## 2020-08-31 DIAGNOSIS — O24.113 PREGNANCY COMPLICATED BY PRE-EXISTING TYPE 2 DIABETES, THIRD TRIMESTER: ICD-10-CM

## 2020-08-31 ASSESSMENT — ACTIVITIES OF DAILY LIVING (ADL): DEPENDENT_IADLS:: INDEPENDENT

## 2020-09-04 ENCOUNTER — RECORDS - HEALTHEAST (OUTPATIENT)
Dept: ULTRASOUND IMAGING | Facility: HOSPITAL | Age: 40
End: 2020-09-04

## 2020-09-04 ENCOUNTER — RECORDS - HEALTHEAST (OUTPATIENT)
Dept: ADMINISTRATIVE | Facility: OTHER | Age: 40
End: 2020-09-04

## 2020-09-04 ENCOUNTER — OFFICE VISIT - HEALTHEAST (OUTPATIENT)
Dept: MATERNAL FETAL MEDICINE | Facility: HOSPITAL | Age: 40
End: 2020-09-04

## 2020-09-04 DIAGNOSIS — O24.113 PRE-EXISTING TYPE 2 DIABETES MELLITUS, IN PREGNANCY, THIRD TRIMESTER: ICD-10-CM

## 2020-09-04 DIAGNOSIS — O24.113 PREGNANCY COMPLICATED BY PRE-EXISTING TYPE 2 DIABETES, THIRD TRIMESTER: ICD-10-CM

## 2020-09-08 ENCOUNTER — OFFICE VISIT - HEALTHEAST (OUTPATIENT)
Dept: MATERNAL FETAL MEDICINE | Facility: HOSPITAL | Age: 40
End: 2020-09-08
Payer: COMMERCIAL

## 2020-09-08 ENCOUNTER — RECORDS - HEALTHEAST (OUTPATIENT)
Dept: ULTRASOUND IMAGING | Facility: HOSPITAL | Age: 40
End: 2020-09-08

## 2020-09-08 DIAGNOSIS — O24.113 PREGNANCY COMPLICATED BY PRE-EXISTING TYPE 2 DIABETES, THIRD TRIMESTER: ICD-10-CM

## 2020-09-08 DIAGNOSIS — O24.113 PRE-EXISTING TYPE 2 DIABETES MELLITUS, IN PREGNANCY, THIRD TRIMESTER: ICD-10-CM

## 2020-09-09 ENCOUNTER — RECORDS - HEALTHEAST (OUTPATIENT)
Dept: ADMINISTRATIVE | Facility: OTHER | Age: 40
End: 2020-09-09

## 2020-09-09 ENCOUNTER — OFFICE VISIT - HEALTHEAST (OUTPATIENT)
Dept: MATERNAL FETAL MEDICINE | Facility: HOSPITAL | Age: 40
End: 2020-09-09

## 2020-09-09 ENCOUNTER — RECORDS - HEALTHEAST (OUTPATIENT)
Dept: ULTRASOUND IMAGING | Facility: HOSPITAL | Age: 40
End: 2020-09-09

## 2020-09-09 ENCOUNTER — PRENATAL OFFICE VISIT - HEALTHEAST (OUTPATIENT)
Dept: FAMILY MEDICINE | Facility: CLINIC | Age: 40
End: 2020-09-09

## 2020-09-09 DIAGNOSIS — O24.312 PRE-EXISTING DIABETES MELLITUS DURING PREGNANCY IN SECOND TRIMESTER: ICD-10-CM

## 2020-09-09 DIAGNOSIS — O09.529 HIGH-RISK PREGNANCY, ELDERLY MULTIGRAVIDA, UNSPECIFIED TRIMESTER: ICD-10-CM

## 2020-09-09 DIAGNOSIS — O24.113 PRE-EXISTING TYPE 2 DIABETES MELLITUS, IN PREGNANCY, THIRD TRIMESTER: ICD-10-CM

## 2020-09-09 DIAGNOSIS — O24.113 PREGNANCY COMPLICATED BY PRE-EXISTING TYPE 2 DIABETES, THIRD TRIMESTER: ICD-10-CM

## 2020-09-09 DIAGNOSIS — O24.119 PRE-EXISTING TYPE 2 DIABETES MELLITUS DURING PREGNANCY, ANTEPARTUM: ICD-10-CM

## 2020-09-09 DIAGNOSIS — Z23 NEED FOR IMMUNIZATION AGAINST INFLUENZA: ICD-10-CM

## 2020-09-09 ASSESSMENT — MIFFLIN-ST. JEOR: SCORE: 1253.19

## 2020-09-10 ENCOUNTER — OFFICE VISIT - HEALTHEAST (OUTPATIENT)
Dept: ENDOCRINOLOGY | Facility: CLINIC | Age: 40
End: 2020-09-10

## 2020-09-10 DIAGNOSIS — O24.119 PRE-EXISTING TYPE 2 DIABETES MELLITUS DURING PREGNANCY, ANTEPARTUM: ICD-10-CM

## 2020-09-11 ENCOUNTER — OFFICE VISIT - HEALTHEAST (OUTPATIENT)
Dept: MATERNAL FETAL MEDICINE | Facility: HOSPITAL | Age: 40
End: 2020-09-11

## 2020-09-11 ENCOUNTER — RECORDS - HEALTHEAST (OUTPATIENT)
Dept: ULTRASOUND IMAGING | Facility: HOSPITAL | Age: 40
End: 2020-09-11

## 2020-09-11 ENCOUNTER — RECORDS - HEALTHEAST (OUTPATIENT)
Dept: ADMINISTRATIVE | Facility: OTHER | Age: 40
End: 2020-09-11

## 2020-09-11 DIAGNOSIS — O24.113 PRE-EXISTING TYPE 2 DIABETES MELLITUS, IN PREGNANCY, THIRD TRIMESTER: ICD-10-CM

## 2020-09-11 DIAGNOSIS — O24.113 PREGNANCY COMPLICATED BY PRE-EXISTING TYPE 2 DIABETES, THIRD TRIMESTER: ICD-10-CM

## 2020-09-14 ENCOUNTER — RECORDS - HEALTHEAST (OUTPATIENT)
Dept: ADMINISTRATIVE | Facility: OTHER | Age: 40
End: 2020-09-14

## 2020-09-14 ENCOUNTER — RECORDS - HEALTHEAST (OUTPATIENT)
Dept: ULTRASOUND IMAGING | Facility: HOSPITAL | Age: 40
End: 2020-09-14

## 2020-09-14 ENCOUNTER — OFFICE VISIT - HEALTHEAST (OUTPATIENT)
Dept: MATERNAL FETAL MEDICINE | Facility: HOSPITAL | Age: 40
End: 2020-09-14

## 2020-09-14 DIAGNOSIS — O24.113 PRE-EXISTING TYPE 2 DIABETES MELLITUS, IN PREGNANCY, THIRD TRIMESTER: ICD-10-CM

## 2020-09-14 DIAGNOSIS — O24.113 PREGNANCY WITH TYPE 2 DIABETES MELLITUS IN THIRD TRIMESTER: ICD-10-CM

## 2020-09-16 ENCOUNTER — PRENATAL OFFICE VISIT - HEALTHEAST (OUTPATIENT)
Dept: FAMILY MEDICINE | Facility: CLINIC | Age: 40
End: 2020-09-16

## 2020-09-16 DIAGNOSIS — Z34.90 PREGNANCY, UNSPECIFIED GESTATIONAL AGE: ICD-10-CM

## 2020-09-16 DIAGNOSIS — E08.00 DIABETES MELLITUS DUE TO UNDERLYING CONDITION WITH HYPEROSMOLARITY WITHOUT COMA, WITHOUT LONG-TERM CURRENT USE OF INSULIN (H): ICD-10-CM

## 2020-09-16 DIAGNOSIS — O09.529 HIGH-RISK PREGNANCY, ELDERLY MULTIGRAVIDA, UNSPECIFIED TRIMESTER: ICD-10-CM

## 2020-09-16 DIAGNOSIS — O24.119 PRE-EXISTING TYPE 2 DIABETES MELLITUS DURING PREGNANCY, ANTEPARTUM: ICD-10-CM

## 2020-09-16 LAB
ALBUMIN UR-MCNC: NEGATIVE MG/DL
GLUCOSE UR STRIP-MCNC: ABNORMAL MG/DL
KETONES UR STRIP-MCNC: NEGATIVE MG/DL

## 2020-09-16 ASSESSMENT — MIFFLIN-ST. JEOR: SCORE: 1261.69

## 2020-09-17 ENCOUNTER — COMMUNICATION - HEALTHEAST (OUTPATIENT)
Dept: FAMILY MEDICINE | Facility: CLINIC | Age: 40
End: 2020-09-17

## 2020-09-17 DIAGNOSIS — E11.9 TYPE 2 DIABETES MELLITUS WITHOUT COMPLICATION, WITHOUT LONG-TERM CURRENT USE OF INSULIN (H): ICD-10-CM

## 2020-09-17 LAB
ALLERGIC TO PENICILLIN: NORMAL
GP B STREP DNA SPEC QL NAA+PROBE: NEGATIVE

## 2020-09-18 ENCOUNTER — OFFICE VISIT - HEALTHEAST (OUTPATIENT)
Dept: MATERNAL FETAL MEDICINE | Facility: HOSPITAL | Age: 40
End: 2020-09-18
Payer: COMMERCIAL

## 2020-09-18 ENCOUNTER — RECORDS - HEALTHEAST (OUTPATIENT)
Dept: ADMINISTRATIVE | Facility: OTHER | Age: 40
End: 2020-09-18

## 2020-09-18 ENCOUNTER — RECORDS - HEALTHEAST (OUTPATIENT)
Dept: ULTRASOUND IMAGING | Facility: HOSPITAL | Age: 40
End: 2020-09-18

## 2020-09-18 DIAGNOSIS — O24.113 PREGNANCY WITH TYPE 2 DIABETES MELLITUS IN THIRD TRIMESTER: ICD-10-CM

## 2020-09-18 DIAGNOSIS — O24.113 PRE-EXISTING TYPE 2 DIABETES MELLITUS, IN PREGNANCY, THIRD TRIMESTER: ICD-10-CM

## 2020-09-18 DIAGNOSIS — O36.8390 FETAL TACHYCARDIA AFFECTING MANAGEMENT OF MOTHER: ICD-10-CM

## 2020-09-21 ENCOUNTER — OFFICE VISIT - HEALTHEAST (OUTPATIENT)
Dept: MATERNAL FETAL MEDICINE | Facility: HOSPITAL | Age: 40
End: 2020-09-21

## 2020-09-21 ENCOUNTER — RECORDS - HEALTHEAST (OUTPATIENT)
Dept: ULTRASOUND IMAGING | Facility: HOSPITAL | Age: 40
End: 2020-09-21

## 2020-09-21 ENCOUNTER — RECORDS - HEALTHEAST (OUTPATIENT)
Dept: ADMINISTRATIVE | Facility: OTHER | Age: 40
End: 2020-09-21

## 2020-09-21 DIAGNOSIS — O24.113 PREGNANCY WITH TYPE 2 DIABETES MELLITUS IN THIRD TRIMESTER: ICD-10-CM

## 2020-09-21 DIAGNOSIS — O24.113 PRE-EXISTING TYPE 2 DIABETES MELLITUS, IN PREGNANCY, THIRD TRIMESTER: ICD-10-CM

## 2020-09-23 ENCOUNTER — PRENATAL OFFICE VISIT - HEALTHEAST (OUTPATIENT)
Dept: FAMILY MEDICINE | Facility: CLINIC | Age: 40
End: 2020-09-23

## 2020-09-23 DIAGNOSIS — E11.9 TYPE 2 DIABETES MELLITUS WITHOUT COMPLICATION, WITHOUT LONG-TERM CURRENT USE OF INSULIN (H): ICD-10-CM

## 2020-09-23 ASSESSMENT — MIFFLIN-ST. JEOR: SCORE: 1261.69

## 2020-09-24 ENCOUNTER — OFFICE VISIT - HEALTHEAST (OUTPATIENT)
Dept: ENDOCRINOLOGY | Facility: CLINIC | Age: 40
End: 2020-09-24

## 2020-09-24 ENCOUNTER — COMMUNICATION - HEALTHEAST (OUTPATIENT)
Dept: NURSING | Facility: CLINIC | Age: 40
End: 2020-09-24

## 2020-09-24 DIAGNOSIS — O24.119 PRE-EXISTING TYPE 2 DIABETES MELLITUS DURING PREGNANCY, ANTEPARTUM: ICD-10-CM

## 2020-09-25 ENCOUNTER — RECORDS - HEALTHEAST (OUTPATIENT)
Dept: ULTRASOUND IMAGING | Facility: HOSPITAL | Age: 40
End: 2020-09-25

## 2020-09-25 ENCOUNTER — OFFICE VISIT - HEALTHEAST (OUTPATIENT)
Dept: MATERNAL FETAL MEDICINE | Facility: HOSPITAL | Age: 40
End: 2020-09-25

## 2020-09-25 ENCOUNTER — RECORDS - HEALTHEAST (OUTPATIENT)
Dept: ADMINISTRATIVE | Facility: OTHER | Age: 40
End: 2020-09-25

## 2020-09-25 DIAGNOSIS — O24.113 PREGNANCY COMPLICATED BY PRE-EXISTING TYPE 2 DIABETES, THIRD TRIMESTER: ICD-10-CM

## 2020-09-25 DIAGNOSIS — O24.113 PRE-EXISTING TYPE 2 DIABETES MELLITUS, IN PREGNANCY, THIRD TRIMESTER: ICD-10-CM

## 2020-09-28 ENCOUNTER — OFFICE VISIT - HEALTHEAST (OUTPATIENT)
Dept: MATERNAL FETAL MEDICINE | Facility: HOSPITAL | Age: 40
End: 2020-09-28

## 2020-09-28 ENCOUNTER — RECORDS - HEALTHEAST (OUTPATIENT)
Dept: ADMINISTRATIVE | Facility: OTHER | Age: 40
End: 2020-09-28

## 2020-09-28 ENCOUNTER — RECORDS - HEALTHEAST (OUTPATIENT)
Dept: ULTRASOUND IMAGING | Facility: HOSPITAL | Age: 40
End: 2020-09-28

## 2020-09-28 DIAGNOSIS — O24.113 PREGNANCY COMPLICATED BY PRE-EXISTING TYPE 2 DIABETES, THIRD TRIMESTER: ICD-10-CM

## 2020-09-28 DIAGNOSIS — O24.113 PRE-EXISTING TYPE 2 DIABETES MELLITUS, IN PREGNANCY, THIRD TRIMESTER: ICD-10-CM

## 2020-09-30 ENCOUNTER — PRENATAL OFFICE VISIT - HEALTHEAST (OUTPATIENT)
Dept: FAMILY MEDICINE | Facility: CLINIC | Age: 40
End: 2020-09-30

## 2020-09-30 DIAGNOSIS — O09.529 HIGH-RISK PREGNANCY, ELDERLY MULTIGRAVIDA, UNSPECIFIED TRIMESTER: ICD-10-CM

## 2020-09-30 DIAGNOSIS — O24.312 PRE-EXISTING DIABETES MELLITUS DURING PREGNANCY IN SECOND TRIMESTER: ICD-10-CM

## 2020-09-30 DIAGNOSIS — O24.119 PRE-EXISTING TYPE 2 DIABETES MELLITUS DURING PREGNANCY, ANTEPARTUM: ICD-10-CM

## 2020-09-30 ASSESSMENT — MIFFLIN-ST. JEOR: SCORE: 1270.77

## 2020-10-02 ENCOUNTER — AMBULATORY - HEALTHEAST (OUTPATIENT)
Dept: LAB | Facility: CLINIC | Age: 40
End: 2020-10-02

## 2020-10-02 ENCOUNTER — RECORDS - HEALTHEAST (OUTPATIENT)
Dept: ULTRASOUND IMAGING | Facility: HOSPITAL | Age: 40
End: 2020-10-02

## 2020-10-02 ENCOUNTER — OFFICE VISIT - HEALTHEAST (OUTPATIENT)
Dept: MATERNAL FETAL MEDICINE | Facility: HOSPITAL | Age: 40
End: 2020-10-02

## 2020-10-02 ENCOUNTER — RECORDS - HEALTHEAST (OUTPATIENT)
Dept: ADMINISTRATIVE | Facility: OTHER | Age: 40
End: 2020-10-02

## 2020-10-02 DIAGNOSIS — O24.113 PREGNANCY COMPLICATED BY PRE-EXISTING TYPE 2 DIABETES, THIRD TRIMESTER: ICD-10-CM

## 2020-10-02 DIAGNOSIS — O24.119 PRE-EXISTING TYPE 2 DIABETES MELLITUS DURING PREGNANCY, ANTEPARTUM: ICD-10-CM

## 2020-10-02 DIAGNOSIS — O24.113 PRE-EXISTING TYPE 2 DIABETES MELLITUS, IN PREGNANCY, THIRD TRIMESTER: ICD-10-CM

## 2020-10-02 DIAGNOSIS — O09.529 HIGH-RISK PREGNANCY, ELDERLY MULTIGRAVIDA, UNSPECIFIED TRIMESTER: ICD-10-CM

## 2020-10-02 LAB
SARS-COV-2 PCR COMMENT: NORMAL
SARS-COV-2 RNA SPEC QL NAA+PROBE: NEGATIVE
SARS-COV-2 VIRUS SPECIMEN SOURCE: NORMAL

## 2020-10-03 ENCOUNTER — COMMUNICATION - HEALTHEAST (OUTPATIENT)
Dept: SCHEDULING | Facility: CLINIC | Age: 40
End: 2020-10-03

## 2020-10-05 ENCOUNTER — COMMUNICATION - HEALTHEAST (OUTPATIENT)
Dept: SCHEDULING | Facility: CLINIC | Age: 40
End: 2020-10-05

## 2020-10-20 ENCOUNTER — COMMUNICATION - HEALTHEAST (OUTPATIENT)
Dept: CARE COORDINATION | Facility: CLINIC | Age: 40
End: 2020-10-20

## 2020-10-20 ENCOUNTER — COMMUNICATION - HEALTHEAST (OUTPATIENT)
Dept: NURSING | Facility: CLINIC | Age: 40
End: 2020-10-20

## 2020-10-29 ENCOUNTER — COMMUNICATION - HEALTHEAST (OUTPATIENT)
Dept: CARE COORDINATION | Facility: CLINIC | Age: 40
End: 2020-10-29

## 2020-10-30 ENCOUNTER — COMMUNICATION - HEALTHEAST (OUTPATIENT)
Dept: CARE COORDINATION | Facility: CLINIC | Age: 40
End: 2020-10-30

## 2020-10-30 ENCOUNTER — OFFICE VISIT - HEALTHEAST (OUTPATIENT)
Dept: FAMILY MEDICINE | Facility: CLINIC | Age: 40
End: 2020-10-30

## 2020-10-30 DIAGNOSIS — E11.9 TYPE 2 DIABETES MELLITUS WITHOUT COMPLICATION, WITHOUT LONG-TERM CURRENT USE OF INSULIN (H): ICD-10-CM

## 2020-10-30 DIAGNOSIS — K21.00 GASTROESOPHAGEAL REFLUX DISEASE WITH ESOPHAGITIS WITHOUT HEMORRHAGE: ICD-10-CM

## 2020-10-30 DIAGNOSIS — E78.5 HYPERLIPIDEMIA LDL GOAL <100: ICD-10-CM

## 2020-10-30 LAB — HBA1C MFR BLD: 5.6 %

## 2020-10-30 ASSESSMENT — MIFFLIN-ST. JEOR: SCORE: 1175.79

## 2020-11-04 ENCOUNTER — OFFICE VISIT - HEALTHEAST (OUTPATIENT)
Dept: FAMILY MEDICINE | Facility: CLINIC | Age: 40
End: 2020-11-04

## 2020-11-04 DIAGNOSIS — E11.9 TYPE 2 DIABETES MELLITUS WITHOUT COMPLICATION, WITHOUT LONG-TERM CURRENT USE OF INSULIN (H): ICD-10-CM

## 2020-11-04 DIAGNOSIS — Z32.01 PREGNANCY TEST POSITIVE: ICD-10-CM

## 2020-11-04 ASSESSMENT — MIFFLIN-ST. JEOR: SCORE: 1175.51

## 2020-11-04 ASSESSMENT — EDINBURGH POSTNATAL DEPRESSION SCALE (EPDS): TOTAL SCORE: 0

## 2020-11-11 ENCOUNTER — RECORDS - HEALTHEAST (OUTPATIENT)
Dept: ADMINISTRATIVE | Facility: OTHER | Age: 40
End: 2020-11-11

## 2020-11-11 LAB — RETINOPATHY: NEGATIVE

## 2020-11-13 ENCOUNTER — RECORDS - HEALTHEAST (OUTPATIENT)
Dept: HEALTH INFORMATION MANAGEMENT | Facility: CLINIC | Age: 40
End: 2020-11-13

## 2020-11-23 ENCOUNTER — COMMUNICATION - HEALTHEAST (OUTPATIENT)
Dept: NURSING | Facility: CLINIC | Age: 40
End: 2020-11-23

## 2020-11-24 ENCOUNTER — COMMUNICATION - HEALTHEAST (OUTPATIENT)
Dept: CARE COORDINATION | Facility: CLINIC | Age: 40
End: 2020-11-24

## 2020-12-17 ENCOUNTER — COMMUNICATION - HEALTHEAST (OUTPATIENT)
Dept: FAMILY MEDICINE | Facility: CLINIC | Age: 40
End: 2020-12-17

## 2020-12-17 DIAGNOSIS — O09.529 SUPERVISION OF HIGH-RISK PREGNANCY OF ELDERLY MULTIGRAVIDA: ICD-10-CM

## 2020-12-21 ENCOUNTER — COMMUNICATION - HEALTHEAST (OUTPATIENT)
Dept: NURSING | Facility: CLINIC | Age: 40
End: 2020-12-21

## 2020-12-22 ENCOUNTER — COMMUNICATION - HEALTHEAST (OUTPATIENT)
Dept: CARE COORDINATION | Facility: CLINIC | Age: 40
End: 2020-12-22

## 2020-12-22 DIAGNOSIS — E11.9 DIABETES (H): ICD-10-CM

## 2020-12-22 DIAGNOSIS — F81.9 LEARNING PROBLEM: ICD-10-CM

## 2021-02-19 ENCOUNTER — COMMUNICATION - HEALTHEAST (OUTPATIENT)
Dept: CARE COORDINATION | Facility: CLINIC | Age: 41
End: 2021-02-19

## 2021-02-19 ENCOUNTER — COMMUNICATION - HEALTHEAST (OUTPATIENT)
Dept: NURSING | Facility: CLINIC | Age: 41
End: 2021-02-19

## 2021-02-19 DIAGNOSIS — E11.9 DIABETES (H): ICD-10-CM

## 2021-02-19 DIAGNOSIS — F81.9 LEARNING PROBLEM: ICD-10-CM

## 2021-03-17 ENCOUNTER — COMMUNICATION - HEALTHEAST (OUTPATIENT)
Dept: FAMILY MEDICINE | Facility: CLINIC | Age: 41
End: 2021-03-17

## 2021-03-17 DIAGNOSIS — O09.529 SUPERVISION OF HIGH-RISK PREGNANCY OF ELDERLY MULTIGRAVIDA: ICD-10-CM

## 2021-04-07 ENCOUNTER — COMMUNICATION - HEALTHEAST (OUTPATIENT)
Dept: FAMILY MEDICINE | Facility: CLINIC | Age: 41
End: 2021-04-07

## 2021-04-07 DIAGNOSIS — Z32.01 PREGNANCY TEST POSITIVE: ICD-10-CM

## 2021-05-27 NOTE — PROGRESS NOTES
MTM Follow Up Encounter  Assessment & Plan                                                     1. Type 2 diabetes mellitus without complication, without long-term current use of insulin (H)  Improving.  A1c not at goal of less than 7%.  Though, patient's blood sugars have been trending in the correct direction.  In the last week, patient did experience 2 hypoglycemic events in the morning, she is aware of how to correct.  Advised patient to ensure that she takes medication with evening meal.  Will reassess A1c in May to determine if medication changes are necessary.  Patient recently started ACE inhibitor without side effects, will reassess kidney function and potassium today.  -Patient to continue current medications and blood glucose monitoring  -Patient to take evening medications with a meal  - Basic Metabolic Panel; Future    2. Acute cystitis with hematuria  Needs further assessment.  Patient states that she continues to have bladder pressure and pain during urination which did not resolve during or after recent ciprofloxacin treatment in March.  Patient not experiencing severe symptoms, she is interested in waiting until follow-up with Dr. Noel to further assess.  Recommended patient be seen promptly if symptoms worsen or fever/chills occur.  - Patient to be seen if experiencing fever/chills or symptoms worsening    3. Gastroesophageal reflux disease without esophagitis  Stable.  Patient currently taking ranitidine 150 mg twice daily.  Patient ran out of omeprazole therapy about 3 weeks ago and has not been experiencing any symptoms.  Will discontinue omeprazole therapy at this time and reassess at a different date if needed.  -Patient to STOP omeprazole therapy and continue ranitidine    4. Dyslipidemia  Stable.  Patient currently taking and tolerating moderate intensity simvastatin 20 mg daily.    Follow Up  Return in about 5 weeks (around 5/16/2019) for Medication Review.    Subjective & Objective                                                      Stacey Melendez is a 38 y.o. female coming in for an initial visit for Medication Therapy Management. She was referred to me from Carlos Noel MD. presnts with  today.     Chief Complaint: no questions.     Medication Adherence/Access: Using pillbox which is helping with adherence - twice daily.     Recent tx of Cystitis: Still having some symptoms - tightness, but not as bad as before. Feels bladder pressure. Little pain when pushing on abdomen and pain during urination. Sometimes itching as well. Was prescribed ciprofloxacin 500 mg two times a day x 10 days on 3/12/19, finished full course of abx. While taking abx she was still having pain. The pain has continued, the pain was never gone even throughout therapy.     Diabetes:  Pt currently taking metformin 1000 mg two times a day, glipizide XL 10 mg daily in the evening after evening meal.    patient is experiencing the following side effects: dizzy twice weekly in the evening.   SMB-2 times daily    Ranges (based on glucometer readings) :   Date FBG     183     117    4/10 110     70 Not sure why these are low - had sx    76     203    / 118    Patient is experiencing hypoglycemia. 2 in last week.  - aware of how to correct   Recent symptoms of high blood sugar? Increased urination, when this happens I check my blood sugar and it is typically high  ACEi/ARB: Lisinopril 2.5 mg daily  Aspirin: Not taking due to not indicated   Diet/Exercise: Stacey eats twice daily usually. Typically eats about the same amount at each meal. Eats rice vegetables and meat. Occasionally I might drink fruit juice from the grocery store, maybe about 1-2 times a week.   Lab Results   Component Value Date    HGBA1C 8.7 (H) 2019    HGBA1C 9.0 (H) 10/08/2018    HGBA1C 7.6 (H) 2018     Lab Results   Component Value Date    GLUF 70 2013    MICROALBUR 5.00 (H) 2018    LDLCALC 78 2018     CREATININE 0.73 12/26/2018     BP Readings from Last 3 Encounters:   04/12/19 110/80   03/12/19 120/76   03/07/19 108/88     GERD without esophagitis: Taking ranitidine 150 mg two times a day. It is okay now, no complaints of stomach upset. Out of omeprazole 20 mg for the last week. No issues since stopping omeprazole.     Hyperlipidemia: Simvastatin 20 mg daily. Denies muscle aching and pain.   Lab Results   Component Value Date    CHOL 170 12/26/2018    CHOL 147 01/04/2018    CHOL 253 (H) 10/06/2017     Lab Results   Component Value Date    HDL 44 (L) 12/26/2018    HDL 43 (L) 01/04/2018    HDL 48 (L) 10/06/2017     Lab Results   Component Value Date    LDLCALC 78 12/26/2018    LDLCALC 61 01/04/2018    LDLCALC 141 (H) 10/06/2017     Lab Results   Component Value Date    TRIG 239 (H) 12/26/2018    TRIG 214 (H) 01/04/2018    TRIG 321 (H) 10/06/2017       PMH: reviewed in EPIC   Allergies/ADRs: reviewed in EPIC   Alcohol: reviewed in EPIC   Tobacco:   Social History     Tobacco Use   Smoking Status Never Smoker   Smokeless Tobacco Current User     Types: Chew   Tobacco Comment    chews betel nut w/tobacco     Today's Vitals:   Vitals:    04/12/19 1522   BP: 110/80   Pulse: 86   Weight: 139 lb 1.6 oz (63.1 kg)     ----------------    Much or all of the text in this note was generated through the use of Dragon Dictate voice-to-text software. Errors in spelling or words which seem out of context are unintentional. Sound alike errors, in particular, may have escaped editing.    The patient declined an after visit summary    I spent 30 minutes with this patient today.   All changes were made via collaborative practice agreement with Carlos Noel MD. A copy of the visit note was provided to the patient's provider.     Kasey Markham, [place]  Medication Therapy Management Pharmacist  [facility]       Current Outpatient Medications   Medication Sig Dispense Refill     blood glucose test (CONTOUR TEST  STRIPS) strips Use 1 each As Directed daily. Dispense brand per patient's insurance at pharmacy discretion. 100 strip 3     generic lancets (FINGERSTIX LANCETS) Use to check blood sugar daily.  Dispense brand per patient's insurance at pharmacy discretion. 100 each 3     glipiZIDE (GLUCOTROL XL) 10 MG 24 hr tablet Take 1 tablet (10 mg total) by mouth daily. 90 tablet 3     levonorgestrel-ethinyl estradiol (ORSYTHIA) 0.1-20 mg-mcg per tablet Take 1 tablet by mouth daily. 84 tablet 3     lisinopril (ZESTRIL) 2.5 MG tablet Take 1 tablet (2.5 mg total) by mouth daily. 90 tablet 1     metFORMIN (GLUCOPHAGE) 1000 MG tablet Take  One in  The morning and 1 and half tablets at night 180 tablet 3     ranitidine (ZANTAC) 150 MG tablet TAKE 1 PILL BY MOUTH 2 TIMES EVERYDAY FOR STOMACH 180 tablet 3     simvastatin (ZOCOR) 20 MG tablet TAKE 1 PILL BY MOUTH EVERYDAY FOR CHOLESTEROL 30 tablet 10     No current facility-administered medications for this visit.

## 2021-05-28 NOTE — PROGRESS NOTES
MTM Follow Up Encounter  Assessment & Plan                                                     1. Type 2 diabetes mellitus without complication, without long-term current use of insulin (H)  Needs improvement.  A1c not at goal of less than 7%.  Blood sugars reported today are higher than previously.  This is likely due to patient not taking her morning dose of medication on occasion.  Pharmacist provided once daily pillbox that patient can bring with her to work in the morning in order to ensure that she gets her morning medication dose.  Additionally, glipizide increased today to twice daily with meals.  Patient agreeable and aware of changes.  - glipiZIDE (GLUCOTROL XL) 10 MG 24 hr tablet; Take 1 tablet (10 mg total) by mouth 2 (two) times a day with meals.  Dispense: 60 tablet; Refill: 3    2. Gastroesophageal reflux disease without esophagitis  Stable.  Patient currently taking ranitidine once daily and managing well without symptoms.  Recommended patient to take ranitidine once daily as needed for symptoms, not necessary to take every day if not needed.  - Patient to take ranitidine once daily as needed for symptoms    3. Dyslipidemia  Stable.  Patient currently taking and tolerating simvastatin 20 mg daily.    4. Noncompliance with medication regimen  Needs improvement.  Patient admittedly misses her morning dose of medications on occasion.  To improve medication adherence, pharmacist provided once daily pillbox for her to bring to work.  - Patient to use once daily pillbox for 'on the go'    Follow Up  Return in about 1 month (around 6/18/2019) for Medication Review.    Subjective & Objective                                                     Stacey Melendez is a 38 y.o. female coming in for an initial visit for Medication Therapy Management. She was referred to me from Carlos Noel MD.  Patient presents with  today.     Chief Complaint: no questions.     Medication Adherence/Access: Using pillbox  which is helping with adherence - twice daily.     Diabetes:  Pt currently taking metformin 1000 mg two times a day, glipizide XL 10 mg daily in the evening after evening meal. Does admit that she misses metformin morning dose sometimes.  Previously taking janumet, then insurance did not cover.   patient is experiencing the following side effects: dizzy twice weekly in the evening.   SMB-2 times daily    Ranges (based on glucometer readings) : states her numbers are up and down all the time.  Date FBG/2 hours PP    154   /15 251   /14 289   5/9 151   5/7 175   5/4 179   5/2 153   Patient is experiencing hypoglycemia. 2 in last week.  - aware of how to correct   Recent symptoms of high blood sugar? Increased urination, when this happens I check my blood sugar and it is typically high  ACEi/ARB: Lisinopril 2.5 mg daily   Aspirin: Not taking due to not indicated   Diet/Exercise: Eah eats twice daily usually. Typically eats about the same amount at each meal. Eats rice vegetables and meat. Occasionally I might drink fruit juice from the grocery store, maybe about 1-2 times a week.   Lab Results   Component Value Date    HGBA1C 8.1 (H) 2019    HGBA1C 8.7 (H) 2019    HGBA1C 9.0 (H) 10/08/2018     Lab Results   Component Value Date    GLUF 70 2013    MICROALBUR 26.30 (H) 2019    LDLCALC 78 2018    CREATININE 0.75 2019     BP Readings from Last 3 Encounters:   19 110/78   19 104/74   19 110/80     GERD without esophagitis: Taking ranitidine 150 mg daily. No complaints of stomach upset or heartburn.      Hyperlipidemia: Simvastatin 20 mg daily. Denies muscle aching and pain.   Lab Results   Component Value Date    CHOL 170 2018    CHOL 147 2018    CHOL 253 (H) 10/06/2017     Lab Results   Component Value Date    HDL 44 (L) 2018    HDL 43 (L) 2018    HDL 48 (L) 10/06/2017     Lab Results   Component Value Date    LDLCALC 78 2018     LDLCALC 61 01/04/2018    LDLCALC 141 (H) 10/06/2017     Lab Results   Component Value Date    TRIG 239 (H) 12/26/2018    TRIG 214 (H) 01/04/2018    TRIG 321 (H) 10/06/2017       PMH: reviewed in EPIC   Allergies/ADRs: reviewed in EPIC   Alcohol: reviewed in EPIC   Tobacco:   Social History     Tobacco Use   Smoking Status Never Smoker   Smokeless Tobacco Current User     Types: Chew   Tobacco Comment    chews betel nut w/tobacco     Today's Vitals:   Vitals:    05/16/19 1529   BP: 110/78   Pulse: 81   Weight: 139 lb (63 kg)     ----------------    The patient was given a summary of these recommendations as an after visit summary    I spent 30 minutes with this patient today.   All changes were made via collaborative practice agreement with Carlos Noel MD. A copy of the visit note was provided to the patient's provider.     Kasey Markham, PharmD  Medication Therapy Management Pharmacist  [facility]       Current Outpatient Medications   Medication Sig Dispense Refill     blood glucose test (CONTOUR TEST STRIPS) strips Use 1 each As Directed daily. Dispense brand per patient's insurance at pharmacy discretion. 100 strip 3     fluconazole (DIFLUCAN) 150 MG tablet Take one tablet today and repeat dose after 1 week 2 tablet 0     generic lancets (FINGERSTIX LANCETS) Use to check blood sugar daily.  Dispense brand per patient's insurance at pharmacy discretion. 100 each 3     glipiZIDE (GLUCOTROL XL) 10 MG 24 hr tablet Take 1 tablet (10 mg total) by mouth 2 (two) times a day with meals. 60 tablet 3     levonorgestrel-ethinyl estradiol (ORSYTHIA) 0.1-20 mg-mcg per tablet Take 1 tablet by mouth daily. 84 tablet 3     lisinopril (ZESTRIL) 2.5 MG tablet Take 1 tablet (2.5 mg total) by mouth daily. 90 tablet 1     metFORMIN (GLUCOPHAGE) 1000 MG tablet Take  One in  The morning and 1 and half tablets at night 180 tablet 3     ranitidine (ZANTAC) 150 MG tablet TAKE 1 PILL BY MOUTH 2 TIMES EVERYDAY FOR  STOMACH 180 tablet 3     simvastatin (ZOCOR) 20 MG tablet TAKE 1 PILL BY MOUTH EVERYDAY FOR CHOLESTEROL 30 tablet 10     No current facility-administered medications for this visit.

## 2021-05-28 NOTE — PROGRESS NOTES
ASSESSMENT AND PLAN:  1. Type 2 diabetes mellitus with stage 3 chronic kidney disease, with long-term current use of insulin (H)    - Microalbumin, Random Urine    2. Hyperlipidemia LDL goal <100    Taking statin no side effects noted.  3. Type 2 diabetes mellitus without complication, without long-term current use of insulin (H)    Reviewed lab test today.  A1c now 8.1.  - blood glucose test (CONTOUR TEST STRIPS) strips; Use 1 each As Directed daily. Dispense brand per patient's insurance at pharmacy discretion.  Dispense: 100 strip; Refill: 3  - generic lancets (FINGERSTIX LANCETS); Use to check blood sugar daily.  Dispense brand per patient's insurance at pharmacy discretion.  Dispense: 100 each; Refill: 3    4. Acute vaginitis  Symptoms are similar to what she experienced in September fluconazole prescribed side effects discussed  - fluconazole (DIFLUCAN) 150 MG tablet; Take one tablet today and repeat dose after 1 week  Dispense: 2 tablet; Refill: 0            Orders Placed This Encounter   Procedures     Microalbumin, Random Urine     Medications Discontinued During This Encounter   Medication Reason     blood glucose test (CONTOUR TEST STRIPS) strips Reorder     generic lancets (FINGERSTIX LANCETS) Reorder       No follow-ups on file.    CHIEF COMPLAINT:  Chief Complaint   Patient presents with     Follow-up     DM       HISTORY OF PRESENT ILLNESS:  Stacey is a 38 y.o. female with diabetes, hyperlipidemia, vitamin D deficiency, and GERD, who presents to the clinic today for follow up on diabetes. Stacey is present with a Wendy . She is feeling well overall. Her fasting blood sugar this morning was 150. Her overall average has been 165. Her last hemoglobin A1c on 02/26/2019 was 8.7. The patient continues on metformin and glipizide though does sometimes forget to take them. She is sleeping okay.    Her burning abdominal pain sometimes flares. Stacey is taking her ranitidine daily. She denies any chest pain,  "headaches, or dizziness.    The patient is no longer having dysuria after completing course of Cipro, but is still having intermittent vaginal itching. She denies any vaginal discharge, rash, or lesions. Her periods are regular. Wet prep was done last September for similar symptoms which returned negative. Stacey was treated empirically for yeast infection with Diflucan at the time with improvement in her symptoms.     REVIEW OF SYSTEMS:   CV: No chest pain.  GI: Intermittent burning abdominal pain.  : Intermittent vaginal itching. No dysuria, vaginal discharge, rash, lesions, or menstrual irregularity.  Neuro: No insomnia, headaches, or dizziness.  All other systems are negative.    PFSH:  She is , employed as a PCA. Reviewed as below.     TOBACCO USE:  Social History     Tobacco Use   Smoking Status Never Smoker   Smokeless Tobacco Current User     Types: Chew   Tobacco Comment    chews betel nut w/tobacco       VITALS:  Vitals:    05/09/19 0758   BP: 104/74   Pulse: 68   Resp: 12   Temp: 98.2  F (36.8  C)   TempSrc: Oral   SpO2: 99%   Weight: 136 lb 3 oz (61.8 kg)   Height: 4' 10.25\" (1.48 m)     Wt Readings from Last 3 Encounters:   05/09/19 136 lb 3 oz (61.8 kg)   04/12/19 139 lb 1.6 oz (63.1 kg)   03/12/19 137 lb 6 oz (62.3 kg)     Body mass index is 28.22 kg/m .      PHYSICAL EXAM:  General: Alert, cooperative, no distress, appears stated age  HEENT: Normocephalic, without obvious abnormality, atraumatic, moist mucous membranes  Eyes: PERRL, conjunctiva/cornea clear, EOM's intact  Neck: Supple, no cervical lymph node enlargement   Lungs: Clear to auscultation bilaterally, respirations unlabored  Heart: Regular rate and rhythm, S1 and S2 normal, no murmur, rub, or gallop  Abdomen: Soft, non tender, bowel sounds active all four quadrants, no masses, no organomegaly.  Neurologic:  A & O x 3.  No tremor, no focal findings.   Normal gait.     DATA REVIEWED:  Additional History from Old Records Summarized " (2): Reviewed clinic note from September 2018 Dr. Dianne zuniga  Patient was seen for vaginitis wet prep was negative  Decision to Obtain Records (1): none  Radiology Tests Summarized or Ordered (1): none  Labs Reviewed or Ordered (1): 02/26/2019 hemoglobin A1c was 8.7. Hemoglobin A1c and urine microalbumin ordered.   Medicine Test Summarized or Ordered (1): none  Independent Review of EKG or X-RAY(2 each): none      I, Lady Eaton, am scribing for and in the presence of, Dr. Noel.    I, Dr. Noel, personally performed the services described in this documentation, as scribed by Lady Eaton in my presence, and it is both accurate and complete.      MEDICATIONS:  Current Outpatient Medications   Medication Sig Dispense Refill     blood glucose test (CONTOUR TEST STRIPS) strips Use 1 each As Directed daily. Dispense brand per patient's insurance at pharmacy discretion. 100 strip 3     generic lancets (FINGERSTIX LANCETS) Use to check blood sugar daily.  Dispense brand per patient's insurance at pharmacy discretion. 100 each 3     glipiZIDE (GLUCOTROL XL) 10 MG 24 hr tablet Take 1 tablet (10 mg total) by mouth daily. 90 tablet 3     lisinopril (ZESTRIL) 2.5 MG tablet Take 1 tablet (2.5 mg total) by mouth daily. 90 tablet 1     metFORMIN (GLUCOPHAGE) 1000 MG tablet Take  One in  The morning and 1 and half tablets at night 180 tablet 3     ranitidine (ZANTAC) 150 MG tablet TAKE 1 PILL BY MOUTH 2 TIMES EVERYDAY FOR STOMACH 180 tablet 3     simvastatin (ZOCOR) 20 MG tablet TAKE 1 PILL BY MOUTH EVERYDAY FOR CHOLESTEROL 30 tablet 10     fluconazole (DIFLUCAN) 150 MG tablet Take one tablet today and repeat dose after 1 week 2 tablet 0     levonorgestrel-ethinyl estradiol (ORSYTHIA) 0.1-20 mg-mcg per tablet Take 1 tablet by mouth daily. 84 tablet 3     No current facility-administered medications for this visit.        Total Data Points: 3    Please note that this clinical encounter uses voice recognition software, there may  be typographical errors present

## 2021-05-29 NOTE — PROGRESS NOTES
MTM Follow Up Encounter  Assessment & Plan                                                     1. Type 2 diabetes mellitus without complication, without long-term current use of insulin (H)  Needs improvement. A1c not at goal of less than 7%. Blood sugars today are similar to previous, since then glipizide dose has been increased but she has been out of metformin and has not received from her pharmacy, will send new rx today. Will assess A1c at next visit.   - metFORMIN (GLUCOPHAGE) 1000 MG tablet; Take 1 tablet (1,000 mg total) by mouth 2 (two) times a day with meals.  Dispense: 180 tablet; Refill: 3    2. Arthritis  Needs improvement. Patient experiencing joint pain and not currently taking anything for it. Last liver function stable, recommend trial of scheduled acetaminophen for pain.   - acetaminophen (TYLENOL) 325 MG tablet; Take 2 tablets (650 mg total) by mouth every 6 (six) hours as needed for pain.  Dispense: 100 tablet; Refill: 1    3. Gastroesophageal reflux disease without esophagitis  Stable, taking ranitidine 150 mg two times a day for stomach. Recommend continuation and use as needed for symptoms.     4. Dyslipidemia  Stable.  Patient currently taking and tolerating simvastatin 20 mg daily.    Follow Up  Return in about 2 months (around 8/27/2019) for Medication Review.    Subjective & Objective                                                       Stacey Melendez is a 39 y.o. female coming in for a follow up visit for Medication Therapy Management. She was referred to me from Carlos Noel MD. Presents with  and her daughter today.     Chief Complaint: MTM f/u    Medication Adherence/Access: Using pillbox which is helping with adherence - twice daily. Using once daily pillbox for on the go.     Diabetes:  Pt currently taking metformin 1000 mg two times a day, glipizide ER 10 mg twice daily with meals. Needs refill of metformin, has not received from pharmacy - been out of medication for the  last week or more.  Patient is not experiencing medication side effects.  SMB-2 times daily    Ranges (based on glucometer readings) :   Date FBG    156   6/17 187   6/13 139   6/12 210   6/10 227   6/8 155   5/30 160   Patient is not experiencing hypoglycemia. Aware of how to correct   Recent symptoms of high blood sugar? None  ACEi/ARB: Lisinopril 2.5 mg daily   Aspirin: Not taking due to not indicated   Diet/Exercise: Eah eats twice daily usually. Typically eats about the same amount at each meal. Eats rice vegetables and meat. Occasionally I might drink fruit juice from the grocery store, maybe about 1-2 times a week. Occasionally exercises.   Lab Results   Component Value Date    HGBA1C 8.1 (H) 2019    HGBA1C 8.7 (H) 2019    HGBA1C 9.0 (H) 10/08/2018     Lab Results   Component Value Date    GLUF 70 2013    MICROALBUR 26.30 (H) 2019    LDLCALC 78 2018    CREATININE 0.75 2019     BP Readings from Last 3 Encounters:   19 102/68   19 110/78   19 104/74     GERD without esophagitis: Taking ranitidine 150 mg twice daily. No complaints of stomach upset or heartburn.      Hyperlipidemia: Simvastatin 20 mg daily. Denies muscle aching and pain.   Lab Results   Component Value Date    LDLCALC 78 2018     Arthritis: Feeling pain in her joints, elbows, knees, wrists. All day long. Not currently taking anything for the pain, states that she would like to try something for this.   Lab Results   Component Value Date    ALT 35 2018    AST 25 2018    ALKPHOS 56 2018    BILITOT 0.8 2018     PMH: reviewed in EPIC   Allergies/ADRs: reviewed in EPIC   Alcohol: reviewed in EPIC   Tobacco:   Social History     Tobacco Use   Smoking Status Never Smoker   Smokeless Tobacco Current User     Types: Chew   Tobacco Comment    chews betel nut w/tobacco     Today's Vitals:   Vitals:    19 1442   BP: 102/68   Pulse: 83   SpO2: 95%   Weight: 137  lb (62.1 kg)     ----------------    The patient was given a summary of these recommendations as an after visit summary    I spent 30 minutes with this patient today;   All changes were made via collaborative practice agreement with Carlos Noel MD. A copy of the visit note was provided to the patient's provider.     Kasey Markham, PharmD  Medication Therapy Management Pharmacist  [facility]       Current Outpatient Medications   Medication Sig Dispense Refill     acetaminophen (TYLENOL) 325 MG tablet Take 2 tablets (650 mg total) by mouth every 6 (six) hours as needed for pain. 100 tablet 1     blood glucose test (CONTOUR TEST STRIPS) strips Use 1 each As Directed daily. Dispense brand per patient's insurance at pharmacy discretion. 100 strip 3     fluconazole (DIFLUCAN) 150 MG tablet Take one tablet today and repeat dose after 1 week 2 tablet 0     generic lancets (FINGERSTIX LANCETS) Use to check blood sugar daily.  Dispense brand per patient's insurance at pharmacy discretion. 100 each 3     glipiZIDE (GLUCOTROL XL) 10 MG 24 hr tablet Take 1 tablet (10 mg total) by mouth 2 (two) times a day with meals. 60 tablet 3     LARISSIA 0.1-20 mg-mcg per tablet TAKE 1 TABLET BY MOUTH DAILY. 84 tablet 3     lisinopril (ZESTRIL) 2.5 MG tablet Take 1 tablet (2.5 mg total) by mouth daily. 90 tablet 1     metFORMIN (GLUCOPHAGE) 1000 MG tablet Take 1 tablet (1,000 mg total) by mouth 2 (two) times a day with meals. 180 tablet 3     ranitidine (ZANTAC) 150 MG tablet TAKE 1 PILL BY MOUTH 2 TIMES EVERYDAY FOR STOMACH 180 tablet 3     simvastatin (ZOCOR) 20 MG tablet TAKE 1 PILL BY MOUTH EVERYDAY FOR CHOLESTEROL 30 tablet 10     No current facility-administered medications for this visit.

## 2021-05-29 NOTE — TELEPHONE ENCOUNTER
Refill Approved    Rx renewed per Medication Renewal Policy. Medication was last renewed on 6/7/18.    Miryam Mora, [facility] Triage/Med Refill 5/22/2019     Requested Prescriptions   Pending Prescriptions Disp Refills     LARISSIA 0.1-20 mg-mcg per tablet [Pharmacy Med Name: LARISSIA 0.1-20 MG-MCG TAB 0.1-20 TAB] 84 tablet 3     Sig: TAKE 1 TABLET BY MOUTH DAILY.       Oral Contraceptives Protocol Passed - 5/21/2019 10:38 AM        Passed - Visit with PCP or prescribing provider visit in last 12 months      Last office visit with prescriber/PCP: 5/9/2019 Carlos Noel MD OR same dept: 5/9/2019 Carlos Noel MD OR same specialty: 5/9/2019 Carlos Noel MD  Last physical: 9/21/2017 Last MTM visit: Visit date not found   Next visit within 3 mo: Visit date not found  Next physical within 3 mo: Visit date not found  Prescriber OR PCP: Carlos Noel MD  Last diagnosis associated with med order: 1. Birth control counseling  - LARISSIA 0.1-20 mg-mcg per tablet [Pharmacy Med Name: LARISSIA 0.1-20 MG-MCG TAB 0.1-20 TAB]; TAKE 1 TABLET BY MOUTH DAILY.  Dispense: 84 tablet; Refill: 3    If protocol passes may refill for 12 months if within 3 months of last provider visit (or a total of 15 months).

## 2021-05-31 ENCOUNTER — RECORDS - HEALTHEAST (OUTPATIENT)
Dept: FAMILY MEDICINE | Facility: CLINIC | Age: 41
End: 2021-05-31

## 2021-05-31 VITALS — WEIGHT: 130.44 LBS | BODY MASS INDEX: 27.38 KG/M2 | HEIGHT: 58 IN

## 2021-05-31 VITALS — HEIGHT: 58 IN | BODY MASS INDEX: 27.45 KG/M2 | WEIGHT: 130.75 LBS

## 2021-05-31 VITALS — WEIGHT: 129.75 LBS | BODY MASS INDEX: 27.23 KG/M2 | HEIGHT: 58 IN

## 2021-05-31 VITALS — BODY MASS INDEX: 26.63 KG/M2 | WEIGHT: 127.44 LBS

## 2021-05-31 VITALS — WEIGHT: 129.13 LBS | BODY MASS INDEX: 26.99 KG/M2

## 2021-05-31 VITALS — BODY MASS INDEX: 27.08 KG/M2 | WEIGHT: 129.56 LBS

## 2021-05-31 VITALS — WEIGHT: 130.56 LBS | BODY MASS INDEX: 27.29 KG/M2

## 2021-05-31 VITALS — BODY MASS INDEX: 27.2 KG/M2 | WEIGHT: 130.13 LBS

## 2021-05-31 VITALS — WEIGHT: 130.5 LBS | BODY MASS INDEX: 27.39 KG/M2 | HEIGHT: 58 IN

## 2021-05-31 NOTE — PROGRESS NOTES
MTM Follow Up Encounter  Assessment & Plan                                                     1. Type 2 diabetes mellitus without complication, without long-term current use of insulin (H)  Needs improvement.  Patient A1c not at goal of less than 7%.  After discussion today, medication adherence continues to be an issue, despite pillbox use.  Pharmacist recommended placing the medications in an area that she will remember to take them every day, or using alarms twice daily to help with medication adherence.  Patient states that she will adamantly work at adherence.  Additionally, patient agreeable to initiate another medication today.  Patient refuses any injectable medications, but accepting of oral addition at this time.  Additionally, patient has only been checking her blood sugars about 1-2 times weekly, recommended checking blood sugars more frequently.  - Patient to check blood sugars at least once daily  - Glycosylated Hemoglobin A1C; today  - SITagliptin (JANUVIA) 50 MG tablet; Take 1 tablet (50 mg total) by mouth daily.  Dispense: 30 tablet; Refill: 3    2. Gastroesophageal reflux disease without esophagitis  Needs improvement.  Despite regular use of ranitidine 150 mg twice daily, patient continues to experience symptoms of acid reflux.  Patient has never tried Tums before, will send prescription today.  - calcium, as carbonate, (TUMS) 200 mg calcium (500 mg) chewable tablet; Chew 1-2 tablets (200-400 mg total) 2 (two) times a day as needed for heartburn.  Dispense: 100 tablet; Refill: 1    3. Dyslipidemia  Stable.  Patient currently taking and tolerating simvastatin 20 mg daily.  Annual lipid checked within the last year.    4. Arthritis  Improving.  Patient continues to use acetaminophen and ibuprofen as needed for pain, recommend continuation of current therapy.    Follow Up  Return in about 1 month (around 9/27/2019) for Medication Review.    Subjective & Objective                                                        Stacey Melendez is a 39 y.o. female coming in for a follow up visit for Medication Therapy Management. She was referred to me from Carlos Noel MD. Presents with professional Wendy  today.     Chief Complaint: Follow Up from [facility] visit on 19    Medication Adherence/Access: Self management. Using pillbox which is helping with adherence - twice daily. Using once daily pillbox for on the go.  States that even with the pillbox, she forgets her medication sometimes, especially in the mornings. States she remembers in the afternoon.     Diabetes:  Pt currently taking metformin 1000 mg two times a day, glipizide ER 10 mg twice daily with meals.  Patient is not experiencing medication side effects. Does not want to do injectable medications. She wants to stick to the orals.   SMB-2 times daily    Ranges (based on glucometer readings) : Only when she doesn't feel well does she check her blood sugars, when she feels dizzy or aching, or pain.   Date FBG    240    204    177    199    265    226    196   Patient is not experiencing hypoglycemia. Aware of how to correct   Recent symptoms of high blood sugar? None  ACEi/ARB: Lisinopril 2.5 mg two times a day, just started 2 days ago, denies dizziness, dry cough,   Aspirin: Not taking due to not indicated   Diet/Exercise: Stacey eats twice daily usually. Typically eats about the same amount at each meal. Eats rice vegetables and meat. Occasionally I might drink fruit juice from the grocery store, maybe about 1-2 times a week. Occasionally exercises.   Lab Results   Component Value Date    HGBA1C 8.7 (H) 2019    HGBA1C 8.1 (H) 2019    HGBA1C 8.7 (H) 2019     Lab Results   Component Value Date    GLUF 70 2013    MICROALBUR 26.30 (H) 2019    LDLCALC 78 2018    CREATININE 0.75 2019     BP Readings from Last 3 Encounters:   19 102/72   19 102/68   19 110/78     GERD  without esophagitis: Taking ranitidine 150 mg twice daily. Admits to some stomach burning that radiates to her back. Having regular bowel movements. Stomach burning not related to time of eating. Stomach burning about once daily, time varies. Feeling pain in her stomach about daily for the past couple days.      Hyperlipidemia: Simvastatin 20 mg daily. Denies muscle aching and pain.  Lab Results   Component Value Date    LDLCALC 78 12/26/2018     Arthritis: Using acetaminophen as needed for pain when needed. Feeling pain in her joints, elbows, knees, wrists.     PMH: reviewed in EPIC   Allergies/ADRs: reviewed in EPIC   Alcohol: reviewed in EPIC   Tobacco:   Social History     Tobacco Use   Smoking Status Never Smoker   Smokeless Tobacco Current User     Types: Chew   Tobacco Comment    chews betel nut w/tobacco     Today's Vitals:   Vitals:    08/27/19 1422   BP: 102/72   Pulse: 80   SpO2: 99%   Weight: 133 lb (60.3 kg)     ----------------    The patient was given a summary of these recommendations as an after visit summary    I spent 30 minutes with this patient today;   All changes were made via collaborative practice agreement with Carlos Noel MD. A copy of the visit note was provided to the patient's provider.     Kasey Markham, PharmD  Medication Therapy Management Pharmacist  [facility]       Current Outpatient Medications   Medication Sig Dispense Refill     acetaminophen (TYLENOL) 325 MG tablet Take 2 tablets (650 mg total) by mouth every 6 (six) hours as needed for pain. 100 tablet 1     blood glucose test (CONTOUR TEST STRIPS) strips Use 1 each As Directed daily. Dispense brand per patient's insurance at pharmacy discretion. 100 strip 3     calcium, as carbonate, (TUMS) 200 mg calcium (500 mg) chewable tablet Chew 1-2 tablets (200-400 mg total) 2 (two) times a day as needed for heartburn. 100 tablet 1     generic lancets (FINGERSTIX LANCETS) Use to check blood sugar daily.  Dispense  brand per patient's insurance at pharmacy discretion. 100 each 3     glipiZIDE (GLUCOTROL XL) 10 MG 24 hr tablet Take 1 tablet (10 mg total) by mouth 2 (two) times a day with meals. 60 tablet 3     LARISSIA 0.1-20 mg-mcg per tablet TAKE 1 TABLET BY MOUTH DAILY. 84 tablet 3     lisinopril (ZESTRIL) 2.5 MG tablet Take 1 tablet (2.5 mg total) by mouth daily. 90 tablet 1     metFORMIN (GLUCOPHAGE) 1000 MG tablet Take 1 tablet (1,000 mg total) by mouth 2 (two) times a day with meals. 180 tablet 3     ranitidine (ZANTAC) 150 MG tablet TAKE 1 PILL BY MOUTH 2 TIMES EVERYDAY FOR STOMACH 180 tablet 3     simvastatin (ZOCOR) 20 MG tablet TAKE 1 PILL BY MOUTH EVERYDAY FOR CHOLESTEROL 30 tablet 10     SITagliptin (JANUVIA) 50 MG tablet Take 1 tablet (50 mg total) by mouth daily. 30 tablet 3     No current facility-administered medications for this visit.

## 2021-06-01 VITALS — WEIGHT: 132.3 LBS | HEIGHT: 58 IN | BODY MASS INDEX: 27.77 KG/M2

## 2021-06-01 VITALS — BODY MASS INDEX: 27.9 KG/M2 | WEIGHT: 133.5 LBS

## 2021-06-01 VITALS — WEIGHT: 129.12 LBS | BODY MASS INDEX: 27.1 KG/M2 | HEIGHT: 58 IN

## 2021-06-01 VITALS — WEIGHT: 133.56 LBS | BODY MASS INDEX: 28.03 KG/M2 | HEIGHT: 58 IN

## 2021-06-01 VITALS — BODY MASS INDEX: 27.21 KG/M2 | WEIGHT: 131.31 LBS

## 2021-06-01 VITALS — BODY MASS INDEX: 28.66 KG/M2 | HEIGHT: 58 IN | WEIGHT: 136.56 LBS

## 2021-06-01 NOTE — PROGRESS NOTES
MTM Follow Up Encounter  Assessment & Plan                                                     1. Type 2 diabetes mellitus without complication, without long-term current use of insulin (H)  Needs improvement.  Patient A1c not at goal of less than 7%.  After discussion today, medication adherence continues to be an issue, despite pillbox use.  Pharmacist recommended placing the medications in an area that she will remember to take them every day, or using alarms twice daily to help with medication adherence.  Patient states that she will adamantly work at adherence.  Additionally, patient agreeable to initiate another medication today.  Patient refuses any injectable medications, but accepting of oral addition at this time.  Additionally, patient has only been checking her blood sugars about 1-2 times weekly, recommended checking blood sugars more frequently.  - Patient to check blood sugars at least once daily  - Glycosylated Hemoglobin A1C; today  - SITagliptin (JANUVIA) 50 MG tablet; Take 1 tablet (50 mg total) by mouth daily.  Dispense: 30 tablet; Refill: 3    2. Gastroesophageal reflux disease without esophagitis  Needs improvement.  Despite regular use of ranitidine 150 mg twice daily, patient continues to experience symptoms of acid reflux.  Patient has never tried Tums before, will send prescription today.  - calcium, as carbonate, (TUMS) 200 mg calcium (500 mg) chewable tablet; Chew 1-2 tablets (200-400 mg total) 2 (two) times a day as needed for heartburn.  Dispense: 100 tablet; Refill: 1    3. Dyslipidemia  Stable.  Patient currently taking and tolerating simvastatin 20 mg daily.  Annual lipid checked within the last year.    4. Arthritis  Improving.  Patient continues to use acetaminophen and ibuprofen as needed for pain, recommend continuation of current therapy.    Follow Up  No follow-ups on file.    Subjective & Objective                                                       Stacey Melendez is a 39 y.o.  female coming in for a follow up visit for Medication Therapy Management. She was referred to me from Carlos Noel MD. Presents with professional Wendy  today.     Chief Complaint: Follow Up from MTM visit on 19    Medication Adherence/Access: Self management. Using pillbox which is helping with adherence - twice daily. Using once daily pillbox for on the go.  States that even with the pillbox, she forgets her medication sometimes, especially in the mornings. States she remembers in the afternoon.     Diabetes:  Pt currently taking metformin 1000 mg two times a day, glipizide ER 10 mg twice daily with meals.  Patient is not experiencing medication side effects. Does not want to do injectable medications. She wants to stick to the orals.   SMB-2 times daily    Ranges (based on glucometer readings) : Only when she doesn't feel well does she check her blood sugars, when she feels dizzy or aching, or pain.   Date FBG    240    204    177    199    265    226    196   Patient is not experiencing hypoglycemia. Aware of how to correct   Recent symptoms of high blood sugar? None  ACEi/ARB: Lisinopril 2.5 mg two times a day, just started 2 days ago, denies dizziness, dry cough,   Aspirin: Not taking due to not indicated   Diet/Exercise: Eah eats twice daily usually. Typically eats about the same amount at each meal. Eats rice vegetables and meat. Occasionally I might drink fruit juice from the grocery store, maybe about 1-2 times a week. Occasionally exercises.   Lab Results   Component Value Date    HGBA1C 8.7 (H) 2019    HGBA1C 8.1 (H) 2019    HGBA1C 8.7 (H) 2019     Lab Results   Component Value Date    GLUF 70 2013    MICROALBUR 26.30 (H) 2019    LDLCALC 78 2018    CREATININE 0.75 2019     BP Readings from Last 3 Encounters:   19 102/72   19 102/68   19 110/78     GERD without esophagitis: Taking ranitidine 150 mg  twice daily. Admits to some stomach burning that radiates to her back. Having regular bowel movements. Stomach burning not related to time of eating. Stomach burning about once daily, time varies. Feeling pain in her stomach about daily for the past couple days.      Hyperlipidemia: Simvastatin 20 mg daily. Denies muscle aching and pain.  Lab Results   Component Value Date    LDLCALC 78 12/26/2018     Arthritis: Using acetaminophen as needed for pain when needed. Feeling pain in her joints, elbows, knees, wrists.     PMH: reviewed in EPIC   Allergies/ADRs: reviewed in EPIC   Alcohol: reviewed in EPIC   Tobacco:   Social History     Tobacco Use   Smoking Status Never Smoker   Smokeless Tobacco Current User     Types: Chew   Tobacco Comment    chews betel nut w/tobacco     Today's Vitals:   There were no vitals filed for this visit.  ----------------    The patient was given a summary of these recommendations as an after visit summary    I spent 30 minutes with this patient today;   All changes were made via collaborative practice agreement with Carlos Noel MD. A copy of the visit note was provided to the patient's provider.     Kasey Markham, PharmD  Medication Therapy Management Pharmacist  [facility]       Current Outpatient Medications   Medication Sig Dispense Refill     acetaminophen (TYLENOL) 325 MG tablet Take 2 tablets (650 mg total) by mouth every 6 (six) hours as needed for pain. 100 tablet 1     blood glucose test (CONTOUR TEST STRIPS) strips Use 1 each As Directed daily. Dispense brand per patient's insurance at pharmacy discretion. 100 strip 3     calcium, as carbonate, (TUMS) 200 mg calcium (500 mg) chewable tablet Chew 1-2 tablets (200-400 mg total) 2 (two) times a day as needed for heartburn. 100 tablet 1     generic lancets (FINGERSTIX LANCETS) Use to check blood sugar daily.  Dispense brand per patient's insurance at pharmacy discretion. 100 each 3     glipiZIDE (GLUCOTROL XL)  10 MG 24 hr tablet TAKE 1 TABLET (10 MG TOTAL) BY MOUTH 2 (TWO) TIMES A DAY WITH MEALS FOR DIABETES 180 tablet 1     LARISSIA 0.1-20 mg-mcg per tablet TAKE 1 TABLET BY MOUTH DAILY. 84 tablet 3     lisinopril (PRINIVIL,ZESTRIL) 2.5 MG tablet TAKE 1 TABLET (2.5 MG TOTAL) BY MOUTH DAILY FOR BLOOD PRESSURE 90 tablet 1     metFORMIN (GLUCOPHAGE) 1000 MG tablet Take 1 tablet (1,000 mg total) by mouth 2 (two) times a day with meals. 180 tablet 3     ranitidine (ZANTAC) 150 MG tablet TAKE 1 PILL BY MOUTH 2 TIMES EVERYDAY FOR STOMACH 180 tablet 3     simvastatin (ZOCOR) 20 MG tablet TAKE 1 PILL BY MOUTH EVERYDAY FOR CHOLESTEROL 30 tablet 10     SITagliptin (JANUVIA) 50 MG tablet Take 1 tablet (50 mg total) by mouth daily. 30 tablet 3     No current facility-administered medications for this visit.

## 2021-06-02 VITALS — WEIGHT: 134 LBS | BODY MASS INDEX: 27.77 KG/M2

## 2021-06-02 VITALS — BODY MASS INDEX: 27.5 KG/M2 | WEIGHT: 131 LBS | HEIGHT: 58 IN

## 2021-06-02 VITALS — HEIGHT: 58 IN | BODY MASS INDEX: 28.84 KG/M2 | WEIGHT: 137.38 LBS

## 2021-06-02 VITALS — WEIGHT: 134.5 LBS | HEIGHT: 58 IN | BODY MASS INDEX: 28.23 KG/M2

## 2021-06-02 VITALS — BODY MASS INDEX: 28.8 KG/M2 | WEIGHT: 139 LBS

## 2021-06-02 VITALS — HEIGHT: 58 IN | WEIGHT: 134 LBS | BODY MASS INDEX: 28.13 KG/M2

## 2021-06-02 VITALS — BODY MASS INDEX: 28.82 KG/M2 | WEIGHT: 139.1 LBS

## 2021-06-02 VITALS — WEIGHT: 136.19 LBS | BODY MASS INDEX: 28.59 KG/M2 | HEIGHT: 58 IN

## 2021-06-03 VITALS
TEMPERATURE: 98.2 F | SYSTOLIC BLOOD PRESSURE: 116 MMHG | RESPIRATION RATE: 18 BRPM | DIASTOLIC BLOOD PRESSURE: 84 MMHG | HEART RATE: 77 BPM | WEIGHT: 136.5 LBS | BODY MASS INDEX: 28.65 KG/M2 | OXYGEN SATURATION: 99 % | HEIGHT: 58 IN

## 2021-06-03 VITALS — WEIGHT: 137 LBS | BODY MASS INDEX: 28.39 KG/M2

## 2021-06-03 VITALS
BODY MASS INDEX: 28.38 KG/M2 | TEMPERATURE: 98.3 F | WEIGHT: 135.19 LBS | HEART RATE: 71 BPM | SYSTOLIC BLOOD PRESSURE: 112 MMHG | OXYGEN SATURATION: 99 % | HEIGHT: 58 IN | DIASTOLIC BLOOD PRESSURE: 74 MMHG | RESPIRATION RATE: 16 BRPM

## 2021-06-03 VITALS
WEIGHT: 137 LBS | OXYGEN SATURATION: 98 % | HEART RATE: 83 BPM | SYSTOLIC BLOOD PRESSURE: 112 MMHG | BODY MASS INDEX: 28.39 KG/M2 | DIASTOLIC BLOOD PRESSURE: 86 MMHG

## 2021-06-03 VITALS — WEIGHT: 133 LBS | BODY MASS INDEX: 27.56 KG/M2

## 2021-06-03 VITALS
WEIGHT: 136.1 LBS | SYSTOLIC BLOOD PRESSURE: 116 MMHG | BODY MASS INDEX: 28.2 KG/M2 | HEART RATE: 88 BPM | DIASTOLIC BLOOD PRESSURE: 84 MMHG | OXYGEN SATURATION: 98 %

## 2021-06-03 NOTE — PROGRESS NOTES
[facility] Follow Up Encounter  Assessment & Plan                                                     1. Type 2 diabetes mellitus without complication, without long-term current use of insulin (H)  Improving.  Although, blood sugars have been low lately.  Patient currently taking metformin 1000 mg twice daily, glipizide XL 10 mg twice daily, and Januvia 50 mg daily.  Hypoglycemic events lately, will decrease glipizide dose today, patient agreeable.  Recommend continued monitoring of blood sugars, now twice daily.  We will follow-up with patient in 4 weeks and make dose adjustments if needed.  Additionally, patient has been taking double the dose of lisinopril, removed duplicate therapy to eliminate confusion today.  -Pharmacist sent refills for both test strips and lancets which reflect testing twice daily  -Duplicate therapy-patient to take lisinopril 2.5 mg daily (was taking double this dose)  -DECREASE dose-glipiZIDE (GLUCOTROL XL) 5 MG 24 hr tablet; TAKE 1 TABLET (5 MG TOTAL) BY MOUTH 2 (TWO) TIMES A DAY WITH MEALS FOR DIABETES  Dispense: 60 tablet; Refill: 3    2. Gastroesophageal reflux disease without esophagitis  Improving.  Recommend continuation of current ranitidine and Tums.    3. Dyslipidemia  Stable. Patient currently taking and tolerating simvastatin 20 mg daily.  Annual lipid checked within the last year.    Follow Up  Return in about 3 weeks (around 11/26/2019) for Medication Review.    Subjective & Objective                                                       Stacey Melendez is a 39 y.o. female coming in for a follow up visit for Medication Therapy Management. She was referred to me from Carlos Noel MD. Presents with professional Wendy  today.     Chief Complaint: Follow Up from MT visit on 9/27/19    Medication Adherence/Access: Self management. Using pillbox which is helping with adherence - twice daily. Using once daily pillbox for on the go.  States that even with the pillbox, she forgets  her medication sometimes, especially in the mornings. States she remembers in the afternoon.     Diabetes:  Pt currently taking metformin 1000 mg two times a day, glipizide ER 10 mg twice daily with meals, sitagliptin 50 mg daily.  States that there has been no changes in her diet at all, continue same foods as previously seen quantities. Patient is not experiencing medication side effects. Does not want to do injectable medications. She wants to stick to the orals.   SMB-2 times daily    Ranges (based on glucometer readings) : Ran out of test strips therefore she has been checking blood sugars sparingly.  Date FBG Date FBG    158 10/17 95   10/29 112 10/16 95   10/23 141 10/15 94   10/22 87 10/14 83   10/21 62 - Shaking a lot  10/12 124   10/19 70 10/10 161   10/18 101 10/08 95   Patient is experiencing hypoglycemia. Aware of how to correct - drank apple juice.   Recent symptoms of high blood sugar? None  ACEi/ARB: Lisinopril 2.5 mg two times a day, prescribed once daily. Confusion lies in the fact that she has 2 bottles of lisinopril - 2 different manufacturers which look different.   Aspirin: Not taking due to not indicated  Statin: Simvastatin 20 mg daily  Diet/Exercise: Meals have recommend the same as previous visits.  Lab Results   Component Value Date    HGBA1C 8.7 (H) 2019    HGBA1C 8.1 (H) 2019    HGBA1C 8.7 (H) 2019     Lab Results   Component Value Date    GLUF 70 2013    MICROALBUR 26.30 (H) 2019    LDLCALC 78 2018    CREATININE 0.75 2019     BP Readings from Last 3 Encounters:   19 112/86   19 102/72   19 102/68     GERD without esophagitis: Taking ranitidine 150 mg twice daily and Tums as needed. Patient states that this medications regimen has been much better for her and controlled her symptoms.       Hyperlipidemia: Simvastatin 20 mg daily. Denies muscle aching and pain.  Lab Results   Component Value Date    LDLCALC 78 2018      PMH: reviewed in EPIC   Allergies/ADRs: reviewed in EPIC   Alcohol: reviewed in EPIC   Tobacco:   Social History     Tobacco Use   Smoking Status Never Smoker   Smokeless Tobacco Current User     Types: Chew   Tobacco Comment    chews betel nut w/tobacco     Today's Vitals:   Vitals:    11/05/19 1441 11/05/19 1442   BP: 116/82 112/86   Pulse:  83   SpO2:  98%   Weight:  137 lb (62.1 kg)     ----------------    The patient was given a summary of these recommendations as an after visit summary    I spent 45 minutes with this patient today;   All changes were made via collaborative practice agreement with Carlos Noel MD. A copy of the visit note was provided to the patient's provider.     Kasey Markham, PharmD  Medication Therapy Management Pharmacist  [facility]       Current Outpatient Medications   Medication Sig Dispense Refill     acetaminophen (TYLENOL) 325 MG tablet Take 2 tablets (650 mg total) by mouth every 6 (six) hours as needed for pain. 100 tablet 1     blood glucose test (CONTOUR TEST STRIPS) strips Use to test blood sugars twice daily. Dispense brand per patient's insurance at pharmacy discretion. 100 strip 3     calcium, as carbonate, (TUMS) 200 mg calcium (500 mg) chewable tablet Chew 1-2 tablets (200-400 mg total) 2 (two) times a day as needed for heartburn. 100 tablet 1     generic lancets (FINGERSTIX LANCETS) Use to check blood sugar twice daily.  Dispense brand per patient's insurance at pharmacy discretion. 100 each 3     glipiZIDE (GLUCOTROL XL) 5 MG 24 hr tablet TAKE 1 TABLET (5 MG TOTAL) BY MOUTH 2 (TWO) TIMES A DAY WITH MEALS FOR DIABETES 60 tablet 3     LARISSIA 0.1-20 mg-mcg per tablet TAKE 1 TABLET BY MOUTH DAILY. 84 tablet 3     lisinopril (PRINIVIL,ZESTRIL) 2.5 MG tablet TAKE 1 TABLET (2.5 MG TOTAL) BY MOUTH DAILY FOR BLOOD PRESSURE 90 tablet 1     metFORMIN (GLUCOPHAGE) 1000 MG tablet Take 1 tablet (1,000 mg total) by mouth 2 (two) times a day with meals. 180  tablet 3     ranitidine (ZANTAC) 150 MG tablet TAKE 1 PILL BY MOUTH 2 TIMES EVERYDAY FOR STOMACH 180 tablet 3     simvastatin (ZOCOR) 20 MG tablet TAKE 1 PILL BY MOUTH EVERYDAY FOR CHOLESTEROL 30 tablet 10     SITagliptin (JANUVIA) 50 MG tablet Take 1 tablet (50 mg total) by mouth daily. 30 tablet 3     No current facility-administered medications for this visit.

## 2021-06-03 NOTE — PROGRESS NOTES
ASSESSMENT AND PLAN:  1. Lower abdominal pain  Urinalysis reviewed no evidence of infection noted  - Urinalysis-UC if Indicated    2. Gastroesophageal reflux disease without esophagitis  Patient should be taking ranitidine    3. Diabetes mellitus due to underlying condition with hyperosmolarity without coma, without long-term current use of insulin (H)  Rechecking A1c today  - Glycosylated Hemoglobin A1c    4. Type 2 diabetes mellitus without complication, without long-term current use of insulin (H)    - blood glucose test (CONTOUR TEST STRIPS) strips; Use to test blood sugars twice daily. Dispense brand per patient's insurance at pharmacy discretion.  Dispense: 100 strip; Refill: 3            Orders Placed This Encounter   Procedures     Urinalysis-UC if Indicated     Glycosylated Hemoglobin A1c     Medications Discontinued During This Encounter   Medication Reason     blood glucose test (CONTOUR TEST STRIPS) strips Reorder       No follow-ups on file.    CHIEF COMPLAINT:  Chief Complaint   Patient presents with     Follow-up     LOWER BACK PAIN     Abdominal Pain       HISTORY OF PRESENT ILLNESS:  Stacey is a 39 y.o. female who presents to the clinic today for abdominal pain. Stacey is present with a [employer]ved . Her most pressing concerns today is her left lower back pain and a bladder issue.   DM: Stacey saw the pharmacist yesterday 11/6/2019 for medication management. Her diabetes medications were adjusted after having some hypoglycemic events. Her glipizide dosage was decreased from 10mg two times a day to 5mg two times a day. It was also recommended that Stacey continued monitoring her blood sugars, twice daily. Today Stacey notes that these changes haven't been immediate and she is still feeling bad.   Bladder: Stacey provided a urine sample to be tested and confirms bladder area and lower left abdominal  tenderness. There is burning when Stacey urinates but she denies blood. If she drinks a lot of water there is less  "discomfort.   Back Pain: Stacey has had lower back pain for a couple weeks, on the left side. She confirms it bothers her when she lays down and radiates around to the left side abdomen.     GERD: Stacey's notes her stomach has been getting better. She is eating okay.    REVIEW OF SYSTEMS:   Denies dizziness, shortness of breath, hematuria and rash.  Admits to chest pain, back pain, dysuria, and abdominal pain.  All other systems are negative.    PFSH:  Stacey has run out of blood sugar test strips.     TOBACCO USE:  Social History     Tobacco Use   Smoking Status Never Smoker   Smokeless Tobacco Current User     Types: Chew   Tobacco Comment    chews betel nut w/tobacco       VITALS:  Vitals:    11/06/19 0804   BP: 112/74   Pulse: 71   Resp: 16   Temp: 98.3  F (36.8  C)   TempSrc: Oral   SpO2: 99%   Weight: 135 lb 3 oz (61.3 kg)   Height: 4' 10.25\" (1.48 m)     Wt Readings from Last 3 Encounters:   11/06/19 135 lb 3 oz (61.3 kg)   11/05/19 137 lb (62.1 kg)   08/27/19 133 lb (60.3 kg)     Body mass index is 28.01 kg/m .      PHYSICAL EXAM:  General: Alert, cooperative, no distress, appears stated age  Back: Symmetric, no curvature, ROM normal, no CVA tenderness  Lungs: Clear to auscultation bilaterally, respirations unlabored  Chest wall: No tenderness or deformity  Heart: Regular rate and rhythm, S1 and S2 normal, no murmur, rub, or gallop  Abdomen: Soft, Tenderness over left lower quadrant of abdomen, bowel sounds active all four quadrants, no masses, no organomegaly.  Neurologic:  A & O x 3.  No tremor, no focal findings.          DATA REVIEWED:  Additional History from Old Records Summarized (2): none  Decision to Obtain Records (1): None  Radiology Tests Summarized or Ordered (1): None  Labs Reviewed or Ordered (1): Labs reviewed and ordered  Medicine Test Summarized or Ordered (1): None  Independent Review of EKG or X-RAY(2 each): None    The visit lasted a total of 25 minutes face to face with the patient. Over 50% of " the time was spent counseling and educating the patient about abdominal pain.    I, Jayla Basurto, am scribing for and in the presence of, Dr. Noel.    I, Dr. Noel, personally performed the services described in this documentation, as scribed by Jayla Basurto in my presence, and it is both accurate and complete.      MEDICATIONS:  Current Outpatient Medications   Medication Sig Dispense Refill     acetaminophen (TYLENOL) 325 MG tablet Take 2 tablets (650 mg total) by mouth every 6 (six) hours as needed for pain. 100 tablet 1     blood glucose test (CONTOUR TEST STRIPS) strips Use to test blood sugars twice daily. Dispense brand per patient's insurance at pharmacy discretion. 100 strip 3     calcium, as carbonate, (TUMS) 200 mg calcium (500 mg) chewable tablet Chew 1-2 tablets (200-400 mg total) 2 (two) times a day as needed for heartburn. 100 tablet 1     generic lancets (FINGERSTIX LANCETS) Use to check blood sugar twice daily.  Dispense brand per patient's insurance at pharmacy discretion. 100 each 3     glipiZIDE (GLUCOTROL XL) 5 MG 24 hr tablet TAKE 1 TABLET (5 MG TOTAL) BY MOUTH 2 (TWO) TIMES A DAY WITH MEALS FOR DIABETES 60 tablet 3     LARISSIA 0.1-20 mg-mcg per tablet TAKE 1 TABLET BY MOUTH DAILY. 84 tablet 3     lisinopril (PRINIVIL,ZESTRIL) 2.5 MG tablet TAKE 1 TABLET (2.5 MG TOTAL) BY MOUTH DAILY FOR BLOOD PRESSURE 90 tablet 1     metFORMIN (GLUCOPHAGE) 1000 MG tablet Take 1 tablet (1,000 mg total) by mouth 2 (two) times a day with meals. 180 tablet 3     ranitidine (ZANTAC) 150 MG tablet TAKE 1 PILL BY MOUTH 2 TIMES EVERYDAY FOR STOMACH 180 tablet 3     simvastatin (ZOCOR) 20 MG tablet TAKE 1 PILL BY MOUTH EVERYDAY FOR CHOLESTEROL 30 tablet 10     SITagliptin (JANUVIA) 50 MG tablet Take 1 tablet (50 mg total) by mouth daily. 30 tablet 3     No current facility-administered medications for this visit.        Total Data Points: 1    Please note that this clinical encounter uses voice recognition software,  there may be typographical errors present

## 2021-06-03 NOTE — PROGRESS NOTES
MTM Follow Up Encounter  Assessment & Plan                                                     1. Type 2 diabetes mellitus without complication, without long-term current use of insulin (H)  Needs improvement.  A1c recently at goal of less than 7%.  At last visit, patient was noting hypoglycemia fairly often, since then blood sugars have been above goal.  Patient currently taking metformin, glipizide, and sitagliptin.  Sitagliptin dose was not yet maximized, renal function has been stable, patient agreeable to increase dose today and aware to stop sitagliptin 50 mg.  - SITagliptin (JANUVIA) 100 MG tablet; Take 1 tablet (100 mg total) by mouth daily.  Dispense: 30 tablet; Refill: 11    2. Gastroesophageal reflux disease without esophagitis  Stable.  Recommend continuation of current ranitidine and Tums.    3. Dyslipidemia  Stable.  Patient currently taking and tolerating lovastatin 20 mg daily. Annual lipid checked within the last year.    4. Uses birth control  Needs improvement. Did not discuss in detail today but referred patient to discuss further with PCP at next visit. Stacey Arteaga has not been taking her birth control since June but would like to restart it at this time.  MTM pharmacist contacted [facility] pharmacy who still has remaining refills of birth control, recommended this medication be filled on a monthly basis with her other medications.  Recommended patient discuss with PCP if she is desiring to become pregnant.  Patient currently taking both ACE inhibitor and statin therapy which would need to be discontinued if patient became pregnant.  - Patient to restart once daily birth control tablet as prescribed and further discuss with PCP    Follow Up  Return in about 2 months (around 2/7/2020) for Medication Review.    Subjective & Objective                                                       Stacey Melendez is a 39 y.o. female coming in for a follow up visit for Medication Therapy Management. She was referred to  me from Carlos Noel MD. Presents with professional Wendy  today.     Chief Complaint: Follow Up from MTM visit on 19    Medication Adherence/Access: Self management. Using pillbox which is helping with adherence - twice daily. Using once daily pillbox for on the go.  States that even with the pillbox, she forgets her medication sometimes, especially in the mornings. States she remembers in the afternoon.     Diabetes:  Pt currently taking metformin 1000 mg two times a day, glipizide ER 5 mg twice daily with meals (dose recently decreased due to hypoglycemia), sitagliptin 50 mg daily.  States that there has been no changes in her diet at all, continue same foods as previously. Patient is not experiencing medication side effects. Does not want to do injectable medications. She wants to stick to the orals.   SMB-2 times daily    Ranges (based on glucometer readings) :   Date FBG 2 hours PP     163      111      229      117 191     133 283     166 287      134    Patient is experiencing one event - 71 hypoglycemia - feels hungry when this happens. Aware of how to correct - drank apple juice.   Recent symptoms of high blood sugar? None  ACEi/ARB: Lisinopril 2.5 mg once daily  Aspirin: Not taking due to not indicated  Statin: Simvastatin 20 mg daily  Diet/Exercise: Meals have recommend the same as previous visits.  Lab Results   Component Value Date    HGBA1C 6.8 (H) 2019    HGBA1C 8.7 (H) 2019    HGBA1C 8.1 (H) 2019     Lab Results   Component Value Date    GLUF 70 2013    MICROALBUR 26.30 (H) 2019    LDLCALC 78 2018    CREATININE 0.75 2019     BP Readings from Last 3 Encounters:   19 116/84   19 112/74   19 112/86     GERD without esophagitis: Taking ranitidine 150 mg twice daily and Tums as needed. Patient states that this medications regimen has been much better for her and controlled her symptoms.      "  Hyperlipidemia: Simvastatin 20 mg daily. Denies muscle aching and pain.  Lab Results   Component Value Date    LDLCALC 78 12/26/2018     Birth control: Presents without breast control tablets today.  States that she has not been taking this medication because \"the pharmacy has not delivered it to her with her other medications\".  States that she wants to restart the birth control. Not sure when her last dose was. Has not received this medication since June per [facility]. They still have 1 year worth of refills at the pharmacy.  Patient wondering if she is too old to have children, states that her period continues every month and that she wouldn't be concerned if she were to become pregnant.    PMH: reviewed in EPIC   Allergies/ADRs: reviewed in EPIC   Alcohol: reviewed in EPIC   Tobacco:   Social History     Tobacco Use   Smoking Status Never Smoker   Smokeless Tobacco Current User     Types: Chew   Tobacco Comment    chews betel nut w/tobacco     Today's Vitals:   Vitals:    11/26/19 1411   BP: 116/84   Pulse: 88   SpO2: 98%   Weight: 136 lb 1.6 oz (61.7 kg)     ----------------    The patient was given a summary of these recommendations as an after visit summary    I spent 45 minutes with this patient today;   All changes were made via collaborative practice agreement with Carlos Noel MD. A copy of the visit note was provided to the patient's provider.     Kasey Markham, PharmD  Medication Therapy Management Pharmacist  [facility]       Current Outpatient Medications   Medication Sig Dispense Refill     acetaminophen (TYLENOL) 325 MG tablet Take 2 tablets (650 mg total) by mouth every 6 (six) hours as needed for pain. 100 tablet 1     blood glucose test (CONTOUR TEST STRIPS) strips Use to test blood sugars twice daily. Dispense brand per patient's insurance at pharmacy discretion. 100 strip 3     calcium, as carbonate, (TUMS) 200 mg calcium (500 mg) chewable tablet Chew 1-2 " tablets (200-400 mg total) 2 (two) times a day as needed for heartburn. 100 tablet 1     generic lancets (FINGERSTIX LANCETS) Use to check blood sugar twice daily.  Dispense brand per patient's insurance at pharmacy discretion. 100 each 3     glipiZIDE (GLUCOTROL XL) 5 MG 24 hr tablet TAKE 1 TABLET (5 MG TOTAL) BY MOUTH 2 (TWO) TIMES A DAY WITH MEALS FOR DIABETES 60 tablet 3     LARISSIA 0.1-20 mg-mcg per tablet TAKE 1 TABLET BY MOUTH DAILY. 84 tablet 3     lisinopril (PRINIVIL,ZESTRIL) 2.5 MG tablet TAKE 1 TABLET (2.5 MG TOTAL) BY MOUTH DAILY FOR BLOOD PRESSURE 90 tablet 1     metFORMIN (GLUCOPHAGE) 1000 MG tablet Take 1 tablet (1,000 mg total) by mouth 2 (two) times a day with meals. 180 tablet 3     ranitidine (ZANTAC) 150 MG tablet TAKE 1 PILL BY MOUTH 2 TIMES EVERYDAY FOR STOMACH 180 tablet 3     simvastatin (ZOCOR) 20 MG tablet TAKE 1 PILL BY MOUTH EVERYDAY FOR CHOLESTEROL 30 tablet 10     SITagliptin (JANUVIA) 100 MG tablet Take 1 tablet (100 mg total) by mouth daily. 30 tablet 11     No current facility-administered medications for this visit.

## 2021-06-04 VITALS
RESPIRATION RATE: 16 BRPM | WEIGHT: 142.75 LBS | DIASTOLIC BLOOD PRESSURE: 62 MMHG | TEMPERATURE: 97.9 F | OXYGEN SATURATION: 98 % | HEIGHT: 58 IN | BODY MASS INDEX: 29.96 KG/M2 | SYSTOLIC BLOOD PRESSURE: 98 MMHG | HEART RATE: 95 BPM

## 2021-06-04 VITALS
TEMPERATURE: 98.3 F | DIASTOLIC BLOOD PRESSURE: 60 MMHG | HEART RATE: 99 BPM | BODY MASS INDEX: 30.84 KG/M2 | WEIGHT: 153 LBS | OXYGEN SATURATION: 97 % | SYSTOLIC BLOOD PRESSURE: 102 MMHG | HEIGHT: 59 IN

## 2021-06-04 VITALS
DIASTOLIC BLOOD PRESSURE: 72 MMHG | RESPIRATION RATE: 16 BRPM | BODY MASS INDEX: 29.6 KG/M2 | HEART RATE: 81 BPM | OXYGEN SATURATION: 98 % | TEMPERATURE: 98.8 F | HEIGHT: 58 IN | SYSTOLIC BLOOD PRESSURE: 110 MMHG | WEIGHT: 141 LBS

## 2021-06-04 VITALS
SYSTOLIC BLOOD PRESSURE: 128 MMHG | TEMPERATURE: 98 F | HEART RATE: 80 BPM | HEIGHT: 58 IN | WEIGHT: 135 LBS | DIASTOLIC BLOOD PRESSURE: 80 MMHG | BODY MASS INDEX: 28.34 KG/M2

## 2021-06-04 VITALS
WEIGHT: 140 LBS | TEMPERATURE: 97.9 F | RESPIRATION RATE: 16 BRPM | DIASTOLIC BLOOD PRESSURE: 62 MMHG | BODY MASS INDEX: 29.39 KG/M2 | SYSTOLIC BLOOD PRESSURE: 98 MMHG | HEIGHT: 58 IN | OXYGEN SATURATION: 98 % | HEART RATE: 84 BPM

## 2021-06-04 VITALS
SYSTOLIC BLOOD PRESSURE: 118 MMHG | WEIGHT: 153 LBS | DIASTOLIC BLOOD PRESSURE: 62 MMHG | HEART RATE: 98 BPM | BODY MASS INDEX: 30.84 KG/M2 | OXYGEN SATURATION: 93 % | HEIGHT: 59 IN | TEMPERATURE: 98.3 F

## 2021-06-04 VITALS
TEMPERATURE: 98.1 F | SYSTOLIC BLOOD PRESSURE: 110 MMHG | RESPIRATION RATE: 18 BRPM | BODY MASS INDEX: 27.03 KG/M2 | HEIGHT: 59 IN | WEIGHT: 134.06 LBS | HEART RATE: 78 BPM | DIASTOLIC BLOOD PRESSURE: 60 MMHG

## 2021-06-04 VITALS
DIASTOLIC BLOOD PRESSURE: 62 MMHG | SYSTOLIC BLOOD PRESSURE: 108 MMHG | HEIGHT: 58 IN | OXYGEN SATURATION: 97 % | RESPIRATION RATE: 20 BRPM | HEART RATE: 88 BPM | TEMPERATURE: 98.2 F | BODY MASS INDEX: 30.44 KG/M2 | WEIGHT: 145 LBS

## 2021-06-04 VITALS
OXYGEN SATURATION: 94 % | HEART RATE: 80 BPM | HEIGHT: 58 IN | BODY MASS INDEX: 30.23 KG/M2 | SYSTOLIC BLOOD PRESSURE: 108 MMHG | TEMPERATURE: 98.5 F | DIASTOLIC BLOOD PRESSURE: 66 MMHG | WEIGHT: 144 LBS

## 2021-06-04 VITALS
SYSTOLIC BLOOD PRESSURE: 112 MMHG | OXYGEN SATURATION: 98 % | HEART RATE: 85 BPM | BODY MASS INDEX: 28.01 KG/M2 | DIASTOLIC BLOOD PRESSURE: 80 MMHG | WEIGHT: 135.2 LBS

## 2021-06-04 VITALS
HEIGHT: 58 IN | DIASTOLIC BLOOD PRESSURE: 60 MMHG | RESPIRATION RATE: 16 BRPM | TEMPERATURE: 98.4 F | BODY MASS INDEX: 31.28 KG/M2 | HEART RATE: 84 BPM | WEIGHT: 149 LBS | OXYGEN SATURATION: 99 % | SYSTOLIC BLOOD PRESSURE: 106 MMHG

## 2021-06-04 VITALS — BODY MASS INDEX: 31.91 KG/M2 | HEIGHT: 58 IN | WEIGHT: 152 LBS

## 2021-06-04 VITALS
OXYGEN SATURATION: 97 % | BODY MASS INDEX: 31.12 KG/M2 | TEMPERATURE: 98.6 F | HEART RATE: 92 BPM | HEIGHT: 58 IN | SYSTOLIC BLOOD PRESSURE: 110 MMHG | WEIGHT: 148.25 LBS | RESPIRATION RATE: 24 BRPM | DIASTOLIC BLOOD PRESSURE: 62 MMHG

## 2021-06-04 VITALS
HEIGHT: 58 IN | BODY MASS INDEX: 29.41 KG/M2 | TEMPERATURE: 98.3 F | HEART RATE: 80 BPM | WEIGHT: 140.13 LBS | SYSTOLIC BLOOD PRESSURE: 104 MMHG | RESPIRATION RATE: 28 BRPM | DIASTOLIC BLOOD PRESSURE: 64 MMHG

## 2021-06-04 VITALS
RESPIRATION RATE: 20 BRPM | WEIGHT: 138 LBS | SYSTOLIC BLOOD PRESSURE: 120 MMHG | TEMPERATURE: 98.3 F | HEART RATE: 88 BPM | DIASTOLIC BLOOD PRESSURE: 70 MMHG | BODY MASS INDEX: 28.97 KG/M2 | HEIGHT: 58 IN

## 2021-06-04 VITALS
OXYGEN SATURATION: 99 % | WEIGHT: 135 LBS | SYSTOLIC BLOOD PRESSURE: 128 MMHG | DIASTOLIC BLOOD PRESSURE: 80 MMHG | HEART RATE: 80 BPM | BODY MASS INDEX: 27.97 KG/M2

## 2021-06-04 VITALS
HEART RATE: 90 BPM | OXYGEN SATURATION: 97 % | WEIGHT: 134 LBS | HEIGHT: 59 IN | TEMPERATURE: 98.3 F | BODY MASS INDEX: 27.01 KG/M2 | DIASTOLIC BLOOD PRESSURE: 62 MMHG | SYSTOLIC BLOOD PRESSURE: 102 MMHG

## 2021-06-04 VITALS
OXYGEN SATURATION: 97 % | DIASTOLIC BLOOD PRESSURE: 72 MMHG | HEART RATE: 92 BPM | TEMPERATURE: 98.7 F | SYSTOLIC BLOOD PRESSURE: 106 MMHG | HEIGHT: 59 IN | WEIGHT: 155 LBS | BODY MASS INDEX: 31.25 KG/M2

## 2021-06-04 NOTE — TELEPHONE ENCOUNTER
Refill Approved    Rx renewed per Medication Renewal Policy. Medication was last renewed on 1/28/19.    Miryam Mora, [facility] Triage/Med Refill 1/3/2020     Requested Prescriptions   Pending Prescriptions Disp Refills     simvastatin (ZOCOR) 20 MG tablet [Pharmacy Med Name: SIMVASTATIN 20 MG TABLET 20 TAB] 30 tablet 10     Sig: TAKE 1 PILL BY MOUTH EVERYDAY FOR CHOLESTEROL       Statins Refill Protocol (Hmg CoA Reductase Inhibitors) Passed - 1/1/2020 12:26 PM        Passed - PCP or prescribing provider visit in past 12 months      Last office visit with prescriber/PCP: 11/6/2019 Carlos Noel MD OR same dept: 11/6/2019 Carlos Noel MD OR same specialty: 11/6/2019 Carlos Noel MD  Last physical: 9/21/2017 Last MTM visit: Visit date not found   Next visit within 3 mo: Visit date not found  Next physical within 3 mo: Visit date not found  Prescriber OR PCP: Carlos Noel MD  Last diagnosis associated with med order: 1. Hyperlipidemia LDL goal <100  - simvastatin (ZOCOR) 20 MG tablet [Pharmacy Med Name: SIMVASTATIN 20 MG TABLET 20 TAB]; TAKE 1 PILL BY MOUTH EVERYDAY FOR CHOLESTEROL  Dispense: 30 tablet; Refill: 10    If protocol passes may refill for 12 months if within 3 months of last provider visit (or a total of 15 months).

## 2021-06-05 NOTE — PATIENT INSTRUCTIONS - HE
Type 2 diabetes:     Hemoglobin A1c lab ordered.    Continue metformin 1000 mg twice a day with meals.    Continue glipizide XL 5 mg twice a day with meals.    Continue Januvia 100 mg daily.     Continue lisinopril 2.5 mg daily for renal protection.     If hemoglobin A1c is stable or improved, follow up in 6 months. Return sooner if hemoglobin A1c is more elevated.    Hyperlipidemia:     Continue simvastatin 20 mg daily at bedtime.    GERD:     Stop ranitidine.     Start omeprazole (PRILOSEC) 20 MG capsule; Take 1 capsule (20 mg total) by mouth daily.    Cold sore:     Start valACYclovir (VALTREX) 500 MG tablet; Take 1 tablet (500 mg total) by mouth 2 (two) times a day for 5 days.    Immunizations:     Flu shot given today.

## 2021-06-05 NOTE — PROGRESS NOTES
MTM Follow Up Encounter  Assessment & Plan                                                     Post Discharge Medication Reconciliation Status: discharge medications reconciled, continue medications without change    1. Type 2 diabetes mellitus without complication, without long-term current use of insulin (H)  Needs improvement. Patients A1c has declined since last visit, not meeting goal of less than 7%. Would recommend use of GLP-1 agonist, patient refusing use of injectable medications. For now, recommended patient increase dose of glipizide and have patient increase BG monitoring. Additionally, recommended diet and exercise regimen including reduced carbohydrate diet. If not effective in the future, would recommend discontinuation of DPP4 and initiation of the oral GLP-1, Rybelsus.   PLAN:  - Glycosylated Hemoglobin A1C; Today  - Increase to glipiZIDE ER 10 MG 24 hr tablet two times a day with meals.    - Increase monitoring of BG to once day, checking both fasting sugars and other days checking 2 hours post-prandial    2. Dyslipidemia  Needs further assessment. Been more than 1 year since evaluation of lipids, will check today. Patient noting episodes of all over muscle aches and pains today, pending lipids could consider switching to atorvastatin 20 mg daily.   PLAN:  - Lipid Cascade; Today  - Update (2/12/20) due to patients muscle aches, will switch from simvastatin to atorvastatin today with recheck lipid cascade in 8-12 weeks    3. Gastroesophageal reflux disease without esophagitis  Needs improvement. Patient currently taking omeprazole 20 mg daily as needed and still noting breakthrough symptoms. PPI therapy typically most effective if taken every day, recommended taking omeprazole 20 mg daily 1/2 hour before meals and continuing to use Tums as needed for breakthrough symptoms. Removing ranitidine from medication list today.  PLAN:   - Take omeprazole 20 mg daily 1/2 hour before meals  - Discontinue use  of ranitidine    4. Uses birth control  Pharmacist reviewed appropriate process of refilling medications, recommended patient refill birth control and continue taking once daily.  Additionally, regarding patient's recent symptoms of vaginal itching, suggested use of Monistat 3 days system, if not resolved within 1 week or symptoms worsening, recommended patient schedule visit with PCP for further evaluation.  PLAN:  - Patient to  and use Monistat 3-day system    Follow Up  Return in about 4 weeks (around 3/6/2020) for Medication Review.    Subjective & Objective                                                       Stacey Melendez is a 39 y.o. female coming in for a follow up visit for Medication Therapy Management. She was referred to me from Carlos Noel MD. Presents with professional Wendy  today.     Chief Complaint: Follow Up from MTM visit on 19.     Medication Adherence/Access: Self management. Using pillbox which is helping with adherence - twice daily. Admits that she forgets to take her medications about 3-4 times monthly, these are times when her BG was in the 300+ fasting.     Diabetes:  Pt currently taking metformin 1000 mg two times a day, glipizide ER 5 mg twice daily with meals, sitagliptin 100 mg daily.  States that there has been no changes in her diet at all, continue same foods as previously. Patient is not experiencing medication side effects. Does not want to do injectable medications. She wants to stick to the orals.   SMB-2 times daily    Ranges (based on glucometer readings) : Not checking every day.  Date FBG Dinner    226    2  267    148     122     141     268     232    Patient is experiencing one event of hypoglycemia - 79 on 20. Aware of how to correct - drank apple juice.   Recent symptoms of high blood sugar? None  ACEi/ARB: Lisinopril 2.5 mg once daily, denies dizziness  Statin: Simvastatin 20 mg daily, admits to some all over  muscle aches 1-2 times weekly  Diet/Exercise: Meals have not changed since last visit. She eats two times a day. Eats soup, vegetable or chicken soup and sometimes noodles. She eats rice at each meal, equal sized meals.   Lab Results   Component Value Date    HGBA1C 8.2 (H) 02/07/2020    HGBA1C 6.8 (H) 11/06/2019    HGBA1C 8.7 (H) 08/27/2019     Lab Results   Component Value Date    GLUF 70 03/25/2013    MICROALBUR 2.24 (H) 01/07/2020    LDLCALC 67 02/07/2020    CREATININE 0.75 04/12/2019     BP Readings from Last 3 Encounters:   02/07/20 112/80   01/07/20 116/84   11/26/19 116/84     GERD without esophagitis: Taking omeprazole 20 mg daily as needed, states that it still hurts a little bit. Having symptoms of dull pain about 1-2 times a week. Still using the ranitidine and tums as needed.   Use of only H2RA and tums was ineffective, recently restarted PPI therapy from PCP.     Hyperlipidemia: Simvastatin 20 mg daily. Admits to some bouts of all over muscle aches and pain about 1-2 times a week but states that moving around helps with any pain.   Lab Results   Component Value Date    LDLCALC 67 02/07/2020     Birth control/vaginitis: Still not taking birth control, states that she ran out and stopped taking it. States that she does want to take it, has not called for refills before, states that it has not been delivered. Patient states that she has been experiencing vaginal itching on and off for about 2 years. She is wondering if she can get vaginal suppositories for relief.  Of note, patient denies any pain vaginal, pain on urination, discharge, or odor.    PMH: reviewed in EPIC   Allergies/ADRs: reviewed in EPIC   Alcohol: None per patient  Tobacco:   Social History     Tobacco Use   Smoking Status Never Smoker   Smokeless Tobacco Current User     Types: Chew   Tobacco Comment    chews betel nut w/tobacco     Today's Vitals:   Vitals:    02/07/20 1431   BP: 112/80   Pulse: 85   SpO2: 98%   Weight: 135 lb 3.2 oz  (61.3 kg)     ----------------    The patient was given a summary of these recommendations as an after visit summary    I spent 45 minutes with this patient today;   All changes were made via collaborative practice agreement with Carlos Noel MD. A copy of the visit note was provided to the patient's provider.     Kasey Ruff), PharmD  Medication Therapy Management Pharmacist  [facility]       Current Outpatient Medications   Medication Sig Dispense Refill     acetaminophen (TYLENOL) 325 MG tablet Take 2 tablets (650 mg total) by mouth every 6 (six) hours as needed for pain. 100 tablet 1     blood glucose test (CONTOUR TEST STRIPS) strips Use to test blood sugars twice daily. Dispense brand per patient's insurance at pharmacy discretion. 100 strip 3     calcium, as carbonate, (TUMS) 200 mg calcium (500 mg) chewable tablet Chew 1-2 tablets (200-400 mg total) 2 (two) times a day as needed for heartburn. 100 tablet 1     generic lancets (FINGERSTIX LANCETS) Use to check blood sugar twice daily.  Dispense brand per patient's insurance at pharmacy discretion. 100 each 3     glipiZIDE (GLUCOTROL XL) 10 MG 24 hr tablet Take 1 tablet (10 mg total) by mouth 2 (two) times a day with meals. 60 tablet 6     LARISSIA 0.1-20 mg-mcg per tablet TAKE 1 TABLET BY MOUTH DAILY. 84 tablet 3     lisinopril (PRINIVIL,ZESTRIL) 2.5 MG tablet TAKE 1 TABLET (2.5 MG TOTAL) BY MOUTH DAILY FOR BLOOD PRESSURE 90 tablet 1     metFORMIN (GLUCOPHAGE) 1000 MG tablet Take 1 tablet (1,000 mg total) by mouth 2 (two) times a day with meals. 180 tablet 3     omeprazole (PRILOSEC) 20 MG capsule Take 1 capsule (20 mg total) by mouth daily. 30 capsule 11     simvastatin (ZOCOR) 20 MG tablet TAKE 1 PILL BY MOUTH EVERYDAY FOR CHOLESTEROL 90 tablet 3     SITagliptin (JANUVIA) 100 MG tablet Take 1 tablet (100 mg total) by mouth daily. 30 tablet 11     No current facility-administered medications for this visit.

## 2021-06-05 NOTE — PROGRESS NOTES
ASSESSMENT AND PLAN:  1. Need for immunization against influenza    - Influenza, Seasonal Quad, PF =/> 6months    2. Immunization due    - Pneumococcal conjugate vaccine 13-valent 6wks-17yrs; >50yrs    3. Hyperlipidemia LDL goal <100    She is taking Zocor will recheck her lipid panel next visit    4. Diabetes mellitus due to underlying condition with hyperosmolarity without coma, without long-term current use of insulin (H)    She is running high on her A1c scores.  She states that sometimes she forgets to take her Januvia.  She will see you her [facility] pharmacist in 2 months.   Checking microalbumin today  5. Type 2 diabetes mellitus without complication, without long-term current use of insulin (H)  Last A1c 6.8.    6. Gastroesophageal reflux disease without esophagitis    Abdominal discomfort still present H. pylori test recently negative omeprazole refilled  - calcium, as carbonate, (TUMS) 200 mg calcium (500 mg) chewable tablet; Chew 1-2 tablets (200-400 mg total) 2 (two) times a day as needed for heartburn.  Dispense: 100 tablet; Refill: 1  - omeprazole (PRILOSEC) 20 MG capsule; Take 1 capsule (20 mg total) by mouth daily.  Dispense: 30 capsule; Refill: 11    7. Cold sore    - valACYclovir (VALTREX) 500 MG tablet; Take 1 tablet (500 mg total) by mouth 2 (two) times a day for 5 days.  Dispense: 10 tablet; Refill: 0            Orders Placed This Encounter   Procedures     Influenza, Seasonal Quad, PF =/> 6months     Pneumococcal conjugate vaccine 13-valent 6wks-17yrs; >50yrs     Medications Discontinued During This Encounter   Medication Reason     calcium, as carbonate, (TUMS) 200 mg calcium (500 mg) chewable tablet Reorder       No follow-ups on file.    CHIEF COMPLAINT:  Chief Complaint   Patient presents with     Diabetes       HISTORY OF PRESENT ILLNESS:  Stacey is a 39 y.o. female with type 2 diabetes, hyperlipidemia, GERD, and vitamin D deficiency, who presents to the clinic today for follow up on diabetes. Stacey  "is present with a [employer] . Her last hemoglobin A1c in November 2019 was 6.8. Her fasting blood sugars have ranged in the 130-150s. Her postprandial blood sugars have been more elevated in the 200 and 300s. She saw our clinical pharmacist on 11/26/2019 at which time Januvia was increase to 100 mg daily.     The patient continues to endorse intermittent abdominal pain about once a week. Ranitidine has not helped much.    She has a lesion by the right corner of her mouth.     REVIEW OF SYSTEMS:   GI: Abdominal pain.   Skin: Lesion by right corner of mouth.   All other 10 point review of systems are negative.    PFSH:  She is  and employed as a PCA. Reviewed as below.     TOBACCO USE:  Social History     Tobacco Use   Smoking Status Never Smoker   Smokeless Tobacco Current User     Types: Chew   Tobacco Comment    chews betel nut w/tobacco       VITALS:  Vitals:    01/07/20 1534   BP: 116/84   Pulse: 77   Resp: 18   Temp: 98.2  F (36.8  C)   TempSrc: Oral   SpO2: 99%   Weight: 136 lb 8 oz (61.9 kg)   Height: 4' 10.25\" (1.48 m)     Wt Readings from Last 3 Encounters:   01/07/20 136 lb 8 oz (61.9 kg)   11/26/19 136 lb 1.6 oz (61.7 kg)   11/06/19 135 lb 3 oz (61.3 kg)     Body mass index is 28.28 kg/m .      PHYSICAL EXAM:  General: Alert, cooperative, no distress, appears stated age  Head: Normocephalic, without obvious abnormality, atraumatic, moist mucous membranes  Eyes: PERRL, conjunctiva/cornea clear, EOM's intact  Neck: Supple, enlarged submandibuar lymph node present  Lungs: Clear to auscultation bilaterally, respirations unlabored  Heart: Regular rate and rhythm, S1 and S2 normal, no murmur, rub, or gallop  Extremities: Sensory exam of the foot is normal, tested with the monofilament. Good pulses, no lesions or ulcers, good peripheral pulses.   Neurologic:  A & O x 3.  No tremor, no focal findings.  Normal gait.   Psychiatric: Normal affect, good eye contact, well-groomed  Skin: Small shallow " ulcer noted at the lateral lip border on the right.     DATA REVIEWED:  Additional History from Old Records Summarized (2): none  Decision to Obtain Records (1): none  Radiology Tests Summarized or Ordered (1): none  Labs Reviewed or Ordered (1): Lab ordered.  Medicine Test Summarized or Ordered (1): none  Independent Review of EKG or X-RAY(2 each): none    ILady, am scribing for and in the presence of, Dr. Noel.    I, Dr. Noel, personally performed the services described in this documentation, as scribed by Lady Eaton in my presence, and it is both accurate and complete.  The amount of time spent the patient 25 minutes, 50% of this time was spent discussing her diabetes, hyperlipidemia, cold sore,    MEDICATIONS:  Current Outpatient Medications   Medication Sig Dispense Refill     acetaminophen (TYLENOL) 325 MG tablet Take 2 tablets (650 mg total) by mouth every 6 (six) hours as needed for pain. 100 tablet 1     blood glucose test (CONTOUR TEST STRIPS) strips Use to test blood sugars twice daily. Dispense brand per patient's insurance at pharmacy discretion. 100 strip 3     calcium, as carbonate, (TUMS) 200 mg calcium (500 mg) chewable tablet Chew 1-2 tablets (200-400 mg total) 2 (two) times a day as needed for heartburn. 100 tablet 1     generic lancets (FINGERSTIX LANCETS) Use to check blood sugar twice daily.  Dispense brand per patient's insurance at pharmacy discretion. 100 each 3     glipiZIDE (GLUCOTROL XL) 5 MG 24 hr tablet TAKE 1 TABLET (5 MG TOTAL) BY MOUTH 2 (TWO) TIMES A DAY WITH MEALS FOR DIABETES 60 tablet 3     lisinopril (PRINIVIL,ZESTRIL) 2.5 MG tablet TAKE 1 TABLET (2.5 MG TOTAL) BY MOUTH DAILY FOR BLOOD PRESSURE 90 tablet 1     metFORMIN (GLUCOPHAGE) 1000 MG tablet Take 1 tablet (1,000 mg total) by mouth 2 (two) times a day with meals. 180 tablet 3     ranitidine (ZANTAC) 150 MG tablet TAKE 1 PILL BY MOUTH 2 TIMES EVERYDAY FOR STOMACH 180 tablet 3     SITagliptin (JANUVIA) 100 MG  tablet Take 1 tablet (100 mg total) by mouth daily. 30 tablet 11     LARISSIA 0.1-20 mg-mcg per tablet TAKE 1 TABLET BY MOUTH DAILY. 84 tablet 3     omeprazole (PRILOSEC) 20 MG capsule Take 1 capsule (20 mg total) by mouth daily. 30 capsule 11     simvastatin (ZOCOR) 20 MG tablet TAKE 1 PILL BY MOUTH EVERYDAY FOR CHOLESTEROL 90 tablet 3     valACYclovir (VALTREX) 500 MG tablet Take 1 tablet (500 mg total) by mouth 2 (two) times a day for 5 days. 10 tablet 0     No current facility-administered medications for this visit.        Total Data Points: 1    Please note that this clinical encounter uses voice recognition software, there may be typographical errors present

## 2021-06-06 NOTE — TELEPHONE ENCOUNTER
RN cannot approve Refill Request: Glipizide    RN can NOT refill this medication medication not on med list and dosage changed on 2/7/2020. Message sent to pharmacy. . Last office visit: 1/7/2020 Carlos Noel MD Last Physical: 9/21/2017 Last MTM visit: Visit date not found Last visit same specialty: 1/7/2020 Carlos Noel MD.  Next visit within 3 mo: Visit date not found  Next physical within 3 mo: Visit date not found    Refill Approved: Lisinopril    Rx renewed per Medication Renewal Policy. Medication was last renewed on 9/4/2019 with 1 refill.  Last office visit: MTM with SARA Markham PharmD on 2/7/2020    Apoorva Iniguez, [facility] Triage/Med Refill 2/26/2020     Requested Prescriptions   Pending Prescriptions Disp Refills     lisinopriL (PRINIVIL,ZESTRIL) 2.5 MG tablet [Pharmacy Med Name: LISINOPRIL 2.5 MG TABLET 2.5 TAB] 90 tablet 1     Sig: TAKE 1 TABLET (2.5 MG TOTAL) BY MOUTH DAILY FOR BLOOD PRESSURE       Ace Inhibitors Refill Protocol Passed - 2/26/2020 10:19 AM        Passed - PCP or prescribing provider visit in past 12 months       Last office visit with prescriber/PCP: 1/7/2020 Carlos Noel MD OR same dept: 1/7/2020 Carlos Noel MD OR same specialty: 1/7/2020 Carlos Noel MD  Last physical: 9/21/2017 Last MTM visit: Visit date not found   Next visit within 3 mo: Visit date not found  Next physical within 3 mo: Visit date not found  Prescriber OR PCP: Carlos Noel MD  Last diagnosis associated with med order: 1. Type 2 diabetes mellitus without complication, without long-term current use of insulin (H)  - lisinopriL (PRINIVIL,ZESTRIL) 2.5 MG tablet [Pharmacy Med Name: LISINOPRIL 2.5 MG TABLET 2.5 TAB]; TAKE 1 TABLET (2.5 MG TOTAL) BY MOUTH DAILY FOR BLOOD PRESSURE  Dispense: 90 tablet; Refill: 1  - glipiZIDE (GLUCOTROL XL) 5 MG 24 hr tablet [Pharmacy Med Name: GLIPIZIDE ER 5 MG TABLET 5 TAB]; TAKE 1 TABLET (5 MG TOTAL) BY MOUTH 2 (TWO) TIMES A DAY WITH MEALS FOR DIABETES  Dispense: 60 tablet;  Refill: 3    If protocol passes may refill for 12 months if within 3 months of last provider visit (or a total of 15 months).             Passed - Serum Potassium in past 12 months     Lab Results   Component Value Date    Potassium 3.9 04/12/2019             Passed - Blood pressure filed in past 12 months     BP Readings from Last 1 Encounters:   02/07/20 112/80             Passed - Serum Creatinine in past 12 months     Creatinine   Date Value Ref Range Status   04/12/2019 0.75 0.60 - 1.10 mg/dL Final             glipiZIDE (GLUCOTROL XL) 5 MG 24 hr tablet [Pharmacy Med Name: GLIPIZIDE ER 5 MG TABLET 5 TAB] 60 tablet 3     Sig: TAKE 1 TABLET (5 MG TOTAL) BY MOUTH 2 (TWO) TIMES A DAY WITH MEALS FOR DIABETES       Oral Hypoglycemics Refill Protocol Passed - 2/26/2020 10:19 AM        Passed - Visit with PCP or prescribing provider visit in last 6 months       Last office visit with prescriber/PCP: 1/7/2020 OR same dept: 1/7/2020 Carlos Noel MD OR same specialty: 1/7/2020 Carlos Noel MD Last physical: Visit date not found Last MTM visit: Visit date not found         Next appt within 3 mo: Visit date not found  Next physical within 3 mo: Visit date not found  Prescriber OR PCP: Carlos Noel MD  Last diagnosis associated with med order: 1. Type 2 diabetes mellitus without complication, without long-term current use of insulin (H)  - lisinopriL (PRINIVIL,ZESTRIL) 2.5 MG tablet [Pharmacy Med Name: LISINOPRIL 2.5 MG TABLET 2.5 TAB]; TAKE 1 TABLET (2.5 MG TOTAL) BY MOUTH DAILY FOR BLOOD PRESSURE  Dispense: 90 tablet; Refill: 1  - glipiZIDE (GLUCOTROL XL) 5 MG 24 hr tablet [Pharmacy Med Name: GLIPIZIDE ER 5 MG TABLET 5 TAB]; TAKE 1 TABLET (5 MG TOTAL) BY MOUTH 2 (TWO) TIMES A DAY WITH MEALS FOR DIABETES  Dispense: 60 tablet; Refill: 3     If protocol passes may refill for 12 months if within 3 months of last provider visit (or a total of 15 months).           Passed - A1C in last 6 months     Hemoglobin A1c   Date Value Ref  Range Status   02/07/2020 8.2 (H) 3.5 - 6.0 % Final               Passed - Microalbumin in last year      Microalbumin, Random Urine   Date Value Ref Range Status   01/07/2020 2.24 (H) 0.00 - 1.99 mg/dL Final                  Passed - Blood pressure in last year     BP Readings from Last 1 Encounters:   02/07/20 112/80             Passed - Serum creatinine in last year     Creatinine   Date Value Ref Range Status   04/12/2019 0.75 0.60 - 1.10 mg/dL Final

## 2021-06-06 NOTE — TELEPHONE ENCOUNTER
"Please call patient with  at [phone] (home)   Patient is requesting clinic appointment. Declines ER recommendation per guideline.    Patient calling stating,\"I would like to make an appointment for chest pain and pain in my throat.\"   Symptoms starting 2 days ago with cough and chest pain. Cough is productive with white sputum. Afebrile. \"I feel feverish though.\"   Reporting chest pain is \"always there\"  worse with coughing.   Rates pain as \"medium\" non radiating. Describes \"burning in throat\" that she feels is a \"sore.\"   Reporting shortness of breath at rest \"mild.\"    Per guidelines advised Emergency Room for difficulty breathing. Patient declines stating she will go in if symptoms worsen. Requests to be seen in clinic.     Deanne Goode RN  [facility]    Reason for Disposition    Difficulty breathing    Protocols used: CHEST PAIN-A-OH      "

## 2021-06-06 NOTE — PROGRESS NOTES
Stacey Melendez is a 39 y.o. female here for pregnancy confirmation     ASSESSMENT/PLAN:   Stacey was seen today for possible pregnancy.    Diagnoses and all orders for this visit:    Pregnancy test positive  Stacey Melendez is a now  at 8w2d by LMP 2020, high risk due to pre-existing diabetes and advanced maternal age. Schedule dating ultrasound within a week, initial ob visit in 2 weeks, start PNV.   -     US OB < 14 Weeks  -     prenatal vit-iron fum-folic ac (PRENATAL VITAMIN) 27 mg iron- 0.8 mg Tab tablet; Take 1 tablet by mouth daily.  - Stop chewing tobacco/betel nut    Diabetes.   Seen by MTM today, A1c 8.2 on 2020. May benefit from transition to insulin during pregnancy.         - Stop januvia, lisinopril, statin        - Increased glipizide from 5mg to 10mg two times a day.       No follow-ups on file.       ======================================================    SUBJECTIVE  Stacey Melendez is a 39 y.o. female here for pregnancy confirmation.     Patient was here for diabetes follow up with [facility], told pharmacist that she thinks she may be pregnant.     Regular periods, LMP 2020.   Pregnancy test positive today in clinic, done at MTM visit.   She is now a . 4 prior pregnancies in [place], [place]. Her children are 18, 15, 10, and 9 years old. She was not trying to get pregnant, but is happy about it. Her  will be happy to find out she is pregnant. All  at term, no complications.     High risk pregnancy due to pre-existing diabetes and advanced maternal age.     Pharmacy stopped statin, ace inhibitor, januvia.   Increased glipizide to 10mg two times a day.     ROS  Complete 10 point review of systems negative except as noted above in HPI    Reviewed Past Medical History, Medications, Family History and Social History in Epic and up to date with no new changes.    OBJECTIVE  /80 (Patient Site: Right Arm, Patient Position: Sitting)   Pulse 80   Temp 98  " F (36.7  C)    4' 10.25\" (1.48 m)   Wt 135 lb (61.2 kg)   BMI 27.97 kg/m       General: Cooperative, pleasant, in no acute distress  CV: RRR, normal S1/S2, no murmur, rubs, gallops  Resp: No respiratory distress. Clear to auscultation bilaterally. No wheezes, rales, rhonchi  Abd: Nontender, nondistended, bowel sounds present  Ext: radial/pedal pulses +2 bilaterally, no edema  Skin: warm, well perfused. No rashes  Psych: No suicidal or homicidal ideations, no self-harm.  Normal affect.    LABS & IMAGES   Results for orders placed or performed in visit on 03/06/20   Pregnancy, Urine   Result Value Ref Range    Pregnancy Test, Urine Positive (!) Negative         ======================================================    Visit was completed along with in person Wendy     Options for treatment and follow-up care were reviewed with the patient. Stacey Melendez and/or guardian was engaged and actively involved in the decision making process. Stacey Melendez and/or guardian verbalized understanding of the options discussed and was satisfied with the final plan.      Wendi Morales MD        "

## 2021-06-06 NOTE — PATIENT INSTRUCTIONS - HE
GERD:     Refill given for omeprazole. Please take this medicine every morning before eating.     Start Carafate four times a day.     Please  your medications at the pharmacy. Do not wait for your medication to be delivered.    Follow up if symptoms do not improve.

## 2021-06-06 NOTE — TELEPHONE ENCOUNTER
Called pt who strongly declined ED when asked by CMT.  Upon further review, pt is NOT taking the Omeprazole PCP prescribed for her in January due to having pain.  Pt states when breathes there is pain and shortness of breath is only very minimal at rest.  Scheduled pt with PCP for 11 am and pt will make her own way to the clinic.  Thanks.

## 2021-06-06 NOTE — PROGRESS NOTES
ASSESSMENT AND PLAN:  1. Gastroesophageal reflux disease without esophagitis  Patient has chest pain which is located in the epigastric area radiates to the sternum and up into the throat significant burning is noted her appetite is normal.  Unsure whether she is taking omeprazole I refilled the medication.  Follow-up recommended within a week if her symptoms are not improving the pain is continuous.  Does not related to activity or exertion there is no associated diaphoresis, headache, dizziness.  Sucralfate prescribed  - omeprazole (PRILOSEC) 20 MG capsule; Take 1 capsule (20 mg total) by mouth daily before breakfast.  Dispense: 30 capsule; Refill: 11  - sucralfate (CARAFATE) 100 mg/mL suspension; Take 10 mL (1 g total) by mouth every 6 (six) hours.  Dispense: 414 mL; Refill: 0    2. Diabetes mellitus due to underlying condition with hyperosmolarity without coma, without long-term current use of insulin (H)    She did not bring her meter and she says she is taking her medications faithfully no polyuria polydipsia noted            No orders of the defined types were placed in this encounter.    Medications Discontinued During This Encounter   Medication Reason     omeprazole (PRILOSEC) 20 MG capsule Reorder       No follow-ups on file.    CHIEF COMPLAINT:  Chief Complaint   Patient presents with     Chest Pain     Shortness of Breath       HISTORY OF PRESENT ILLNESS:  Stacey is a 39 y.o. female with learning problems, diabetes, hyperlipidemia, GERD, and vitamin D deficiency, who presents to the clinic today for chest pain and shortness of breath. Stacey is present with a [employer] . 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 The patient reports burning pain in her throat with shooting pain over the sternum as well as radiation of pain into her upper back and epigastrium. She went home from work and tried icing her chest without improvement. Stacey has been able to swallow and eat normally. There  "has been no fever, emesis, water brash, burning pain in her mouth, issues with bowel movements, or dysuria. It worsened last night and this morning. The pain is constantly present. Coughing exacerbates the pain. Her chest feels tight with respirations, and there has been some mild shortness of breath. She is unsure if she is still taking omeprazole as her medications are set up for her. Unfortunately, the patient has not brought in her medications for review today.     REVIEW OF SYSTEMS:   General: No fever. Normal appetite.    ENT: Sore throat. No burning pain in mouth, water brash, or dysphagia.   Respiratory: Chest tightness with respirations. Mild shortness of breath.  Chest: Sternal pain.   Musculoskeletal: Upper back pain.   GI: Epigastric pain. No emesis. Normal bowel movements.  : No dysuria.   All other systems are negative.    PFSH:  She is employed as a PCA. Reviewed as below.     TOBACCO USE:  Social History     Tobacco Use   Smoking Status Never Smoker   Smokeless Tobacco Current User     Types: Chew   Tobacco Comment    chews betel nut w/tobacco       VITALS:  Vitals:    02/28/20 1056   BP: 104/64   Pulse: 80   Resp: 28   Temp: 98.3  F (36.8  C)   TempSrc: Oral   Weight: 140 lb 2 oz (63.6 kg)   Height: 4' 10.25\" (1.48 m)     Wt Readings from Last 3 Encounters:   02/28/20 140 lb 2 oz (63.6 kg)   02/07/20 135 lb 3.2 oz (61.3 kg)   01/07/20 136 lb 8 oz (61.9 kg)     Body mass index is 29.04 kg/m .    PHYSICAL EXAM:  General: Alert, cooperative, no distress, appears stated age  Head: Normocephalic, without obvious abnormality, atraumatic  Eyes: PERRL, conjunctiva/cornea clear, EOM's intact  Nose: Nares normal, no drainage or sinus tenderness  Throat: Lips, mucosa, and tongue normal; teeth and gums normal, posterior of pharyngeal wall is nonerythematous   Neck: Supple, no cervical lymph node enlargement   Back: Symmetric, no curvature, nontender to palpation down the thoracic or lumbar spine  Lungs: Clear " to auscultation bilaterally, respirations unlabored  Chest wall: No tenderness or deformity, no tenderness over the sternum  Heart: Regular rate and rhythm, S1 and S2 normal, no murmur, rub, or gallop  Abdomen: Soft, non tender over the epigastrium or rest of abdominal quadrants, bowel sounds active all four quadrants, no masses, no organomegaly.  Neurologic:  A & O x 3.  No tremor, no focal findings.  Normal gait.   Psychiatric: Normal affect, good eye contact, well-groomed  Skin: No rash or suspicious lesions noted on exposed skin, non-diaphoretic    DATA REVIEWED:  Additional History from Old Records Summarized (2): none  Decision to Obtain Records (1): none  Radiology Tests Summarized or Ordered (1): none  Labs Reviewed or Ordered (1): none  Medicine Test Summarized or Ordered (1): none  Independent Review of EKG or X-RAY(2 each): none    ILady, am scribing for and in the presence of, Dr. Noel.    I, Dr. Noel, personally performed the services described in this documentation, as scribed by Lady Eaton in my presence, and it is both accurate and complete.      MEDICATIONS:  Current Outpatient Medications   Medication Sig Dispense Refill     acetaminophen (TYLENOL) 325 MG tablet Take 2 tablets (650 mg total) by mouth every 6 (six) hours as needed for pain. 100 tablet 1     atorvastatin (LIPITOR) 20 MG tablet Take 1 tablet (20 mg total) by mouth daily. 30 tablet 11     blood glucose test (CONTOUR TEST STRIPS) strips Use to test blood sugars twice daily. Dispense brand per patient's insurance at pharmacy discretion. 100 strip 3     calcium, as carbonate, (TUMS) 200 mg calcium (500 mg) chewable tablet Chew 1-2 tablets (200-400 mg total) 2 (two) times a day as needed for heartburn. 100 tablet 1     generic lancets (FINGERSTIX LANCETS) Use to check blood sugar twice daily.  Dispense brand per patient's insurance at pharmacy discretion. 100 each 3     glipiZIDE (GLUCOTROL XL) 10 MG 24 hr tablet Take 1 tablet  (10 mg total) by mouth 2 (two) times a day with meals. 60 tablet 6     glipiZIDE (GLUCOTROL XL) 5 MG 24 hr tablet TAKE 1 TABLET (5 MG TOTAL) BY MOUTH 2 (TWO) TIMES A DAY WITH MEALS FOR DIABETES 60 tablet 3     LARISSIA 0.1-20 mg-mcg per tablet TAKE 1 TABLET BY MOUTH DAILY. 84 tablet 3     lisinopriL (PRINIVIL,ZESTRIL) 2.5 MG tablet TAKE 1 TABLET (2.5 MG TOTAL) BY MOUTH DAILY FOR BLOOD PRESSURE 90 tablet 3     metFORMIN (GLUCOPHAGE) 1000 MG tablet Take 1 tablet (1,000 mg total) by mouth 2 (two) times a day with meals. 180 tablet 3     omeprazole (PRILOSEC) 20 MG capsule Take 1 capsule (20 mg total) by mouth daily before breakfast. 30 capsule 11     SITagliptin (JANUVIA) 100 MG tablet Take 1 tablet (100 mg total) by mouth daily. 30 tablet 11     sucralfate (CARAFATE) 100 mg/mL suspension Take 10 mL (1 g total) by mouth every 6 (six) hours. 414 mL 0     No current facility-administered medications for this visit.    Total amount time spent with the patient today was 25 minutes greater than 50% of this time was spent discussing chest pain and gastritis    Total Data Points: 0    Please note that this clinical encounter uses voice recognition software, there may be typographical errors present

## 2021-06-06 NOTE — PROGRESS NOTES
MTM Follow Up Encounter  Assessment & Plan                                                     Post Discharge Medication Reconciliation Status: discharge medications reconciled, continue medications without change    1. Uses birth control  Confirmed pregnancy test today, positive.  Deferred to OB GYN at [facility] for initial visit today.  Removed statin and ACE inhibitor from patient's medications today, also ensured discontinuation from [facility].  - Pregnancy, Urine    2. Type 2 diabetes mellitus without complication, without long-term current use of insulin (H)  Needs further assessment.  Self monitored blood glucose remains relatively controlled today, though will need continued monitoring throughout pregnancy.  Recommend continued use of metformin, and glipizide today.  - With positive pregnancy today, discontinued sitagliptin, lisinopril, and atorvastatin, discontinued at [facility]     3. Dyslipidemia  As discussed above, atorvastatin discontinued due to positive pregnancy test today.  Recommend reassessment after pregnancy completion.    4. Gastroesophageal reflux disease without esophagitis  Stable.  Recommend continuation of current omeprazole daily and Tums as needed.    Follow Up  Return in about 2 weeks (around 3/20/2020) for Primary Care.  MT after pregnancy   Subjective & Objective                                                       Stacey Melendez is a 39 y.o. female coming in for a follow up visit for Medication Therapy Management. She was referred to me from Carlos Noel MD. Presents with professional Wendy  today.     Chief Complaint: Follow Up from MT visit on 2/7/20    Medication Adherence/Access: Self management. Using pillbox which is helping with adherence - twice daily. Admits that she forgets to take her medications about 3-4 times monthly, these are times when her BG was in the 300+ fasting.     Diabetes:  Pt currently taking metformin 1000 mg two times a day,  glipizide ER 5 mg twice daily with meals, sitagliptin 100 mg daily.    SMB-2 times daily    Ranges (based on glucometer readings) : Not checking every day.  Date FBG Dinner   3/6 169    3 196    3/4 173    3/1 355     142     215     113 338   Patient is not experiencing hypoglycemia. Aware of how to correct - drank apple juice.   Recent symptoms of high blood sugar? None  ACEi/ARB: Lisinopril 2.5 mg once daily, denies dizziness  Statin: Simvastatin 20 mg daily, admits to some all over muscle aches 1-2 times weekly  Diet/Exercise: States that her appetite has been good, eating as normal. Eats soup, vegetable or chicken soup and sometimes noodles. She eats rice at each meal, equal sized meals.   Lab Results   Component Value Date    HGBA1C 8.2 (H) 2020    HGBA1C 6.8 (H) 2019    HGBA1C 8.7 (H) 2019     Lab Results   Component Value Date    GLUF 70 2013    MICROALBUR 2.24 (H) 2020    LDLCALC 67 2020    CREATININE 0.75 2019     BP Readings from Last 3 Encounters:   20 128/80   20 (!) 130/93   20 104/64     GERD without esophagitis: Taking omeprazole 20 mg daily Tums daily as needed. Denies recent symptoms of heartburn.     Hyperlipidemia: Simvastatin 20 mg daily. Admits to some bouts of all over muscle aches and pain about 1-2 times a week.  Lab Results   Component Value Date    LDLCALC 67 2020     Birth control/vaginitis: Still not taking her birth control. Has not had a period since , wondering if she is pregnant. Did not discuss with Dr. Noel last week.     PMH: reviewed in EPIC   Allergies/ADRs: reviewed in EPIC   Alcohol: None per patient  Tobacco:   Social History     Tobacco Use   Smoking Status Never Smoker   Smokeless Tobacco Current User     Types: Chew   Tobacco Comment    chews betel nut w/tobacco     Today's Vitals:   Vitals:    20 1430   BP: 128/80   Pulse: 80   SpO2: 99%   Weight: 135 lb (61.2 kg)      ----------------    The patient was given a summary of these recommendations as an after visit summary    I spent 45 minutes with this patient today;   All changes were made via collaborative practice agreement with Carlos Noel MD. A copy of the visit note was provided to the patient's provider.     Kasey Ruff), PharmD  Medication Therapy Management Pharmacist  [facility]       Current Outpatient Medications   Medication Sig Dispense Refill     acetaminophen (TYLENOL) 325 MG tablet Take 2 tablets (650 mg total) by mouth every 6 (six) hours as needed for pain. 100 tablet 1     blood glucose test (CONTOUR TEST STRIPS) strips Use to test blood sugars twice daily. Dispense brand per patient's insurance at pharmacy discretion. 100 strip 3     calcium, as carbonate, (TUMS) 200 mg calcium (500 mg) chewable tablet Chew 1-2 tablets (200-400 mg total) 2 (two) times a day as needed for heartburn. 100 tablet 1     generic lancets (FINGERSTIX LANCETS) Use to check blood sugar twice daily.  Dispense brand per patient's insurance at pharmacy discretion. 100 each 3     glipiZIDE (GLUCOTROL XL) 10 MG 24 hr tablet Take 1 tablet (10 mg total) by mouth 2 (two) times a day with meals. 60 tablet 6     metFORMIN (GLUCOPHAGE) 1000 MG tablet Take 1 tablet (1,000 mg total) by mouth 2 (two) times a day with meals. 180 tablet 3     omeprazole (PRILOSEC) 20 MG capsule Take 1 capsule (20 mg total) by mouth daily before breakfast. 30 capsule 11     prenatal vit-iron fum-folic ac (PRENATAL VITAMIN) 27 mg iron- 0.8 mg Tab tablet Take 1 tablet by mouth daily. 100 tablet 3     sucralfate (CARAFATE) 100 mg/mL suspension Take 10 mL (1 g total) by mouth every 6 (six) hours. 414 mL 0     No current facility-administered medications for this visit.

## 2021-06-06 NOTE — TELEPHONE ENCOUNTER
----- Message from Kasey Markham, PharmD sent at 2/12/2020  7:15 AM CST -----  Can we call to let the patient know that based on their cholesterol I am switching her from simvastatin to atorvastatin today, new rx already sent to the pharmacy. Make sure she brings her medications to our next appt, 3/6/20. Thanks!

## 2021-06-07 NOTE — PROGRESS NOTES
MTM Consult Encounter    Stacey Melendez is a 39 y.o. female referred for a clinical pharmacist consult from Dr. Vasquez for insulin teaching. Patient presents with professional Wendy .    Discussion: Patient recently found out that she was pregnant.  Dr. Vasquez is requesting insulin teaching today-not because it is going to be initiated today but for her to know just in case insulin as prescribed in the future.  Therefore, reviewed appropriate insulin information today, including storage, directions for injection, and use a demonstration pen for teaching.  Patient able to appropriately reflect instructions for use utilizing demonstration pen.    Plan:  1.  Pharmacist provided insulin teaching, patient aware how to administer if ever insulin is indicated    Follow up:   Dr. Vasquez as requested     Kasey Braga [facility] PharmD  Medication Therapy Management Pharmacist  [facility]    Current Outpatient Medications   Medication Sig Dispense Refill     acetaminophen (TYLENOL) 325 MG tablet Take 2 tablets (650 mg total) by mouth every 6 (six) hours as needed for pain. 100 tablet 1     aspirin 81 MG EC tablet Take 1 tablet (81 mg total) by mouth daily. 90 tablet 2     blood glucose test (CONTOUR TEST STRIPS) strips Use 1 each As Directed 4 (four) times a day. Dispense brand per patient's insurance at pharmacy discretion. 100 strip 3     generic lancets (FINGERSTIX LANCETS) Use 1 each As Directed 4 (four) times a day. Dispense brand per patient's insurance 100 each 3     glipiZIDE (GLUCOTROL XL) 10 MG 24 hr tablet Take 1 tablet (10 mg total) by mouth 2 (two) times a day with meals. 60 tablet 6     metFORMIN (GLUCOPHAGE) 1000 MG tablet Take 1 tablet (1,000 mg total) by mouth 2 (two) times a day with meals. 180 tablet 3     omeprazole (PRILOSEC) 20 MG capsule Take 1 capsule (20 mg total) by mouth daily before breakfast. 30 capsule 11     prenatal vit-iron fum-folic ac (PRENATAL VITAMIN)  27 mg iron- 0.8 mg Tab tablet Take 1 tablet by mouth daily. 100 tablet 3     No current facility-administered medications for this visit.

## 2021-06-07 NOTE — TELEPHONE ENCOUNTER
Patient is scheduled for MFM consult via telephone call at [facility] on 4/7-11:45am.    PRATIK Rangel .

## 2021-06-07 NOTE — PROGRESS NOTES
"Stacey Melendez is a 39 y.o. female who is being evaluated via a billable telephone visit.      The patient has been notified of following:     \"This telephone visit will be conducted via a call between you and your physician/provider. We have found that certain health care needs can be provided without the need for a physical exam.  This service lets us provide the care you need with a short phone conversation.  If a prescription is necessary we can send it directly to your pharmacy.  If lab work is needed we can place an order for that and you can then stop by our lab to have the test done at a later time.    Telephone visits are billed at different rates depending on your insurance coverage. During this emergency period, for some insurers they may be billed the same as an in-person visit.  Please reach out to your insurance provider with any questions.    If during the course of the call the physician/provider feels a telephone visit is not appropriate, you will not be charged for this service.\"    Patient has given verbal consent to a Telephone visit? Yes    Patient would like to receive their AVS by AVS Preference: Mail a copy.    Additional provider notes:      Reason for visit      1. Pre-existing type 2 diabetes mellitus during pregnancy in second trimester        HPI     Stacey Melendez is a very pleasant 39 y.o. old female who presents for management of Diabetes Mellitus during pregnancy.  She is currently 14w1d  weeks pregnant . Due date is 10/14/2020    Current carbohydrate intake:consistent with recommendations of 30g-60g-60g.  I have reviewed her blood glucose logs and note that the:  Fasting readings  are:above range on current regimen  Postprandial readings are:above range on current regimen  Current Lantus dose: 12  Current Prandial insulin: 12   Blood glucose logs/meter brought in and data reviewed and incorporated into decision-making.  Planned delivery at: Unknown  OBGYN: " [facility]    Therapy/Interventions in the past:  She has been seen by the Diabetes Educator- and has received instruction on carbohydrate counting and  consistency.  Records from referring provider and other sources have also been reviewed and incorporated into decision-making.      TODAY:  Stacey is contacted today for the first time after starting insulin for DM 2 in pregnancy.  We are assisted today by a Professional . Her latest A1c was 7.4, which was an improvement from her last. Looking at Stacey's BG, it is noted that she continues to be poorly controlled.  She has several rather low readings listed and is unsure why. With discussion, I find that she is still taking the Glipizide that she was told to stop. This may be the cause of erratic readings. She is once again told to stop this, but to remain on the Metformin. Insulin dosages will be increased and pt again instructed to eat regularly, including snacks.       4-9  F 96  B 246  L 118  D 200    4-10  F 96  B 145  L 71  D 192    4-11  F 75  B 189  L 153  D 167    4-12  F 60  B 208  L 61  D 150    4-13  F 69  B 239  L 143  D 220    4-14  F 69  B n/a  L 124  D 161    4-15  F 78  B n/a  L 176  D n/a    4-16  F 93  B n/a  L 146    Past Medical History       Patient Active Problem List   Diagnosis     Gastroesophageal reflux disease with esophagitis     Learning problem     Diabetes (H)     Hyperlipidemia LDL goal <100     High-risk pregnancy, elderly multigravida, unspecified trimester     Pre-existing type 2 diabetes mellitus during pregnancy in second trimester        Past Surgical History     No past surgical history on file.    Family History     Family History   Problem Relation Age of Onset     Hyperlipidemia Mother      Diabetes Mother      Cancer Neg Hx      Breast cancer Neg Hx      Hypertension Neg Hx        Social History     Social History     Tobacco Use     Smoking status: Never Smoker     Smokeless tobacco: Former User     Types: Chew      Tobacco comment: chews betel nut NO Tobacco   Substance Use Topics     Alcohol use: No     Drug use: No       Review of Systems     Patient has no polyuria or polydipsia, no chest pain, dyspnea or TIA's, no numbness, tingling or pain in extremities  Remainder negative except as noted in HPI.      Vital Signs     LMP 2020 (Exact Date)   Wt Readings from Last 3 Encounters:   20 138 lb (62.6 kg)   20 135 lb (61.2 kg)   20 135 lb (61.2 kg)           Assessment     1. Pre-existing type 2 diabetes mellitus during pregnancy in second trimester        Plan     1.  DIABETES in pregnancy -  Adjust dose as follows:    -Lantus insulin 16   units. Increase by 2 units every 2 days to keep fasting blood glucose below 95mg/dL  -Novolog 16  units with breakfast  -Novolog 16 units with lunch   -Novolog 16 units with dinner  -Increase by 2 units every 2 days to keep 1 hour after meal blood glucose less than 140mg/dL    We reviewed glucose goals of fasting blood glucose <95 mg/dL and 1 hour post prandial blood glucose of <140 mg/dL.    Monitor blood sugar 4 times daily: Fasting  and 1 hour after each meal.  Contact  this clinic [phone] if blood glucose is not within the above-mentioned goals.     We discussed the importance of excellent glycemic control during pregnancy to limit complications such as fetal macrosomia, shoulder dystocia,  hypoglycemia and hyperbilirubinemia.  I have discussed the patient's increased risk of recurrent GDM and/or development of type 2 diabetes later in life.        Confirmed and re-confirmed understanding with pt through . We will connect again next week.          Lab Results     Hemoglobin A1c   Date Value Ref Range Status   2020 7.4 (H) 3.5 - 6.0 % Final   2020 8.2 (H) 3.5 - 6.0 % Final   2019 6.8 (H) 3.5 - 6.0 % Final   2019 8.7 (H) 3.5 - 6.0 % Final   2019 8.1 (H) 3.5 - 6.0 % Final     Creatinine   Date Value Ref Range  Status   04/12/2019 0.75 0.60 - 1.10 mg/dL Final   12/26/2018 0.73 0.60 - 1.10 mg/dL Final   10/08/2018 0.84 0.60 - 1.10 mg/dL Final     Microalbumin, Random Urine   Date Value Ref Range Status   01/07/2020 2.24 (H) 0.00 - 1.99 mg/dL Final       Cholesterol   Date Value Ref Range Status   02/07/2020 182 <=199 mg/dL Final     HDL Cholesterol   Date Value Ref Range Status   02/07/2020 43 (L) >=50 mg/dL Final     LDL Calculated   Date Value Ref Range Status   02/07/2020 67 <=129 mg/dL Final     Triglycerides   Date Value Ref Range Status   02/07/2020 361 (H) <=149 mg/dL Final       Lab Results   Component Value Date    ALT 35 12/26/2018    AST 25 12/26/2018    ALKPHOS 56 12/26/2018    BILITOT 0.8 12/26/2018         Current Medications     Outpatient Medications Prior to Visit   Medication Sig Dispense Refill     acetaminophen (TYLENOL) 325 MG tablet Take 2 tablets (650 mg total) by mouth every 6 (six) hours as needed for pain. 100 tablet 1     aspirin 81 MG EC tablet Take 1 tablet (81 mg total) by mouth daily. 90 tablet 2     blood glucose test (CONTOUR TEST STRIPS) strips Use 1 each As Directed 4 (four) times a day. Dispense brand per patient's insurance at pharmacy discretion. 100 strip 3     generic lancets (FINGERSTIX LANCETS) Use 1 each As Directed 4 (four) times a day. Dispense brand per patient's insurance 100 each 3     glipiZIDE (GLUCOTROL XL) 10 MG 24 hr tablet Take 1 tablet (10 mg total) by mouth 2 (two) times a day with meals. 60 tablet 6     insulin glargine (LANTUS SOLOSTAR U-100 INSULIN) 100 unit/mL (3 mL) pen Take 12 units of insulin every night before bed.  Increase as directed.  Max daily dose 40 units. 4 adj dose pen 2     insulin lispro (HUMALOG KWIKPEN INSULIN) 100 unit/mL pen Take 10 units before each meal every day.  Increase as directed.  Max daily dose 50 units. 5 adj dose pen 2     metFORMIN (GLUCOPHAGE) 1000 MG tablet Take 1 tablet (1,000 mg total) by mouth 2 (two) times a day with meals. 180  "tablet 3     omeprazole (PRILOSEC) 20 MG capsule Take 1 capsule (20 mg total) by mouth daily before breakfast. 30 capsule 11     pen needle, diabetic 32 gauge x 5/32\" Ndle Use 1 each As Directed 3 (three) times a day. 100 each 1     prenatal vit-iron fum-folic ac (PRENATAL VITAMIN) 27 mg iron- 0.8 mg Tab tablet Take 1 tablet by mouth daily. 100 tablet 3     No facility-administered medications prior to visit.          Total time of call between patient and provider was 20 minutes    Emile SUMENRP-C    "

## 2021-06-07 NOTE — TELEPHONE ENCOUNTER
Spoke with Dr. Vasquez Wednesday before patient's appointment.  Received update on patient's current blood sugars.

## 2021-06-07 NOTE — TELEPHONE ENCOUNTER
Date: 4/9/2020 Status: [place]   Time: 2:20 PM Length: 20   Visit Type: TELEPHONE VISIT [[id number]] Copay: $0.00   Provider: Betsy Joy NP Department: [facility]   Referring Provider: JAMAL EPSTEIN CSN: [id number]   Notes: [facility] clinic

## 2021-06-07 NOTE — PROGRESS NOTES
"Stacey Melendez is a 39 y.o. female who is being evaluated via a billable telephone visit.      The patient has been notified of following:     \"This telephone visit will be conducted via a call between you and your physician/provider. We have found that certain health care needs can be provided without the need for a physical exam.  This service lets us provide the care you need with a short phone conversation.  If a prescription is necessary we can send it directly to your pharmacy.  If lab work is needed we can place an order for that and you can then stop by our lab to have the test done at a later time.    Telephone visits are billed at different rates depending on your insurance coverage. During this emergency period, for some insurers they may be billed the same as an in-person visit.  Please reach out to your insurance provider with any questions.    If during the course of the call the physician/provider feels a telephone visit is not appropriate, you will not be charged for this service.\"    Patient has given verbal consent to a Telephone visit? Yes    Patient would like to receive their AVS by AVS Preference: Mail a copy.    Stacey Melendez complains of    Chief Complaint   Patient presents with     Diabetes Mellitus     gestational       I have reviewed and updated the patient's Past Medical History, Social History, Family History and Medication List.    ALLERGIES  Patient has no known allergies.    Additional provider notes:       Reason for visit      1. Pre-existing type 2 diabetes mellitus during pregnancy in second trimester        HPI     Stacey Melendez is a very pleasant 39 y.o. old female who presents for management of Diabetes Mellitus in pregnancy.  She is [id number]  weeks pregnant . Due date is 10/14/2020   Diagnosed with GDM based on an OGTT. She hashad  GDM in prior pregnancies.   Current carbohydrate intake:consistent with recommendations of 30g-60g-60g.  I have reviewed her blood glucose " logs and note that the:  Fasting readings  are:above range on current regimen  Postprandial readings are:above range on current regimen  Current Lantus dose:12  Current Prandial insulin: 12/12/12  Blood glucose logs/meter brought in and data reviewed and incorporated into decision-making.  Planned delivery at: Unsure  OBGYN: [facility]    Therapy/Interventions in the past:  She has been seen by the Diabetes Educator- and has received instruction on carbohydrate counting and  consistency.  Records from referring provider and other sources have also been reviewed and incorporated into decision-making.      TODAY:   Stacey joins us via phone today for her first appointment after starting insulin. We are assisted by a Professional . She is a diabetic and her last A1c was 7.4. She reports that all of her live pregnancies were about 3 Kilos.   She has provided BG and obvious is that she is not testing as she has been instructed. Of the numbers that she has provided, it is clear that she needs more insulin. She is instructed to increase both doses to 16 units. This is pt's first [place] in [place]     4-9  F 96  After B 246    4-8 - Wednesday   F 107  After B 228  After L N/A  After D N/A    4-7 - Tuesday  F 64  After B 219  After L 60  After D 72    4-6 - Monday  F 129  After B N/A  After L N/A  After D N/A    4-5 - Sunday  F 151  After B N/A  After L 164  After D 177    4-4 - Saturday  F 105  After B 217  After L N/A  After D N/A    4-3   F 94    4-2  F 112  After B N/A  After L 235  After D N/A       Past Medical History       Patient Active Problem List   Diagnosis     Gastroesophageal reflux disease with esophagitis     Learning problem     Diabetes (H)     Hyperlipidemia LDL goal <100     High-risk pregnancy, elderly multigravida, unspecified trimester     Pre-existing type 2 diabetes mellitus during pregnancy in second trimester        Past Surgical  History     No past surgical history on file.    Family History     Family History   Problem Relation Age of Onset     Hyperlipidemia Mother      Diabetes Mother      Cancer Neg Hx      Breast cancer Neg Hx      Hypertension Neg Hx        Social History     Social History     Tobacco Use     Smoking status: Never Smoker     Smokeless tobacco: Former User     Types: Chew     Tobacco comment: chews betel nut NO Tobacco   Substance Use Topics     Alcohol use: No     Drug use: No       Review of Systems     Patient has no polyuria or polydipsia, no chest pain, dyspnea or TIA's, no numbness, tingling or pain in extremities  Remainder negative except as noted in HPI.      Vital Signs     LMP 2020 (Exact Date)   Wt Readings from Last 3 Encounters:   20 138 lb (62.6 kg)   20 135 lb (61.2 kg)   20 135 lb (61.2 kg)         Assessment     1. Pre-existing type 2 diabetes mellitus during pregnancy in second trimester        Plan     1. GESTATIONAL DIABETES-  Adjust dose as follows:    -Lantus [id number]   units. Increase by 2 units every 2 days to keep fasting blood glucose below 95mg/dL  -Novolog 16  units with breakfast  -Novolog 16 units with lunch   -Novolog 16 units with dinner  -Increase by 2 units every 2 days to keep 1 hour after meal blood glucose less than 140mg/dL    We reviewed glucose goals of fasting blood glucose <95 mg/dL and 1 hour post prandial blood glucose of <140 mg/dL.    Monitor blood sugar 4 times daily: Fasting  and 1 hour after each meal.  Contact  this clinic [phone] if blood glucose is not within the above-mentioned goals.     We discussed the importance of excellent glycemic control during pregnancy to limit complications such as fetal macrosomia, shoulder dystocia,  hypoglycemia and hyperbilirubinemia.  I have discussed the patient's increased risk of recurrent GDM and/or development of type 2 diabetes later in life.        Pt urged to test as instructed. She  will f/u with us in 1 week.          Lab Results     Hemoglobin A1c   Date Value Ref Range Status   03/25/2020 7.4 (H) 3.5 - 6.0 % Final   02/07/2020 8.2 (H) 3.5 - 6.0 % Final   11/06/2019 6.8 (H) 3.5 - 6.0 % Final   08/27/2019 8.7 (H) 3.5 - 6.0 % Final   05/09/2019 8.1 (H) 3.5 - 6.0 % Final     Creatinine   Date Value Ref Range Status   04/12/2019 0.75 0.60 - 1.10 mg/dL Final   12/26/2018 0.73 0.60 - 1.10 mg/dL Final   10/08/2018 0.84 0.60 - 1.10 mg/dL Final     Microalbumin, Random Urine   Date Value Ref Range Status   01/07/2020 2.24 (H) 0.00 - 1.99 mg/dL Final       Cholesterol   Date Value Ref Range Status   02/07/2020 182 <=199 mg/dL Final     HDL Cholesterol   Date Value Ref Range Status   02/07/2020 43 (L) >=50 mg/dL Final     LDL Calculated   Date Value Ref Range Status   02/07/2020 67 <=129 mg/dL Final     Triglycerides   Date Value Ref Range Status   02/07/2020 361 (H) <=149 mg/dL Final       Lab Results   Component Value Date    ALT 35 12/26/2018    AST 25 12/26/2018    ALKPHOS 56 12/26/2018    BILITOT 0.8 12/26/2018         Current Medications     Outpatient Medications Prior to Visit   Medication Sig Dispense Refill     acetaminophen (TYLENOL) 325 MG tablet Take 2 tablets (650 mg total) by mouth every 6 (six) hours as needed for pain. 100 tablet 1     aspirin 81 MG EC tablet Take 1 tablet (81 mg total) by mouth daily. 90 tablet 2     blood glucose test (CONTOUR TEST STRIPS) strips Use 1 each As Directed 4 (four) times a day. Dispense brand per patient's insurance at pharmacy discretion. 100 strip 3     generic lancets (FINGERSTIX LANCETS) Use 1 each As Directed 4 (four) times a day. Dispense brand per patient's insurance 100 each 3     glipiZIDE (GLUCOTROL XL) 10 MG 24 hr tablet Take 1 tablet (10 mg total) by mouth 2 (two) times a day with meals. 60 tablet 6     insulin glargine (LANTUS SOLOSTAR U-100 INSULIN) 100 unit/mL (3 mL) pen Take 12 units of insulin every night before bed.  Increase as  "directed.  Max daily dose 40 units. 4 adj dose pen 2     insulin lispro (HUMALOG KWIKPEN INSULIN) 100 unit/mL pen Take 10 units before each meal every day.  Increase as directed.  Max daily dose 50 units. 5 adj dose pen 2     metFORMIN (GLUCOPHAGE) 1000 MG tablet Take 1 tablet (1,000 mg total) by mouth 2 (two) times a day with meals. 180 tablet 3     omeprazole (PRILOSEC) 20 MG capsule Take 1 capsule (20 mg total) by mouth daily before breakfast. 30 capsule 11     pen needle, diabetic 32 gauge x 5/32\" Ndle Use 1 each As Directed 3 (three) times a day. 100 each 1     prenatal vit-iron fum-folic ac (PRENATAL VITAMIN) 27 mg iron- 0.8 mg Tab tablet Take 1 tablet by mouth daily. 100 tablet 3     No facility-administered medications prior to visit.        Total time of call between patient and provider was 20 minutes    Emile BLANCHARD-NADEEN    "

## 2021-06-07 NOTE — PROGRESS NOTES
"Stacey Melendez is a 39 y.o. female who is being evaluated via a billable telephone visit.      The patient has been notified of following:     \"This telephone visit will be conducted via a call between you and your physician/provider. We have found that certain health care needs can be provided without the need for a physical exam.  This service lets us provide the care you need with a short phone conversation.  If a prescription is necessary we can send it directly to your pharmacy.  If lab work is needed we can place an order for that and you can then stop by our lab to have the test done at a later time.    If during the course of the call the physician/provider feels a telephone visit is not appropriate, you will not be charged for this service.\"     Patient has given verbal consent to a Telephone visit? Yes    Stacey Melendez complains of    Chief Complaint   Patient presents with     Routine Prenatal Visit       Has appointment  with MFM   Has appointment with Diabetes education- later today, virtual visit    She has been checking her sugars and writing them down in log book  Fastin, 85, 93  Post-prandial 1 hr: 233, 184, 301, 140, 157, 239, 198, 124 (1 hour after eating)  Denies any nausea, vomiting, vaginal bleeding, abdominal pain  She had progenity done 3/25- results normal, It's a BOY.    I have reviewed and updated the patient's Past Medical History, Social History, Family History and Medication List.    ALLERGIES  Patient has no known allergies.    Additional provider notes: none    Assessment/Plan:  Stacey was seen today for routine prenatal visit and diabetes.    Diagnoses and all orders for this visit:    High-risk pregnancy, elderly multigravida, unspecified trimester: Advanced maternal age- progenity normal. Reviewed results with patient today    Diabetes mellitus due to underlying condition with hyperosmolarity without coma, without long-term current use of insulin (H): Last A1C 7.4. She is on " glipizide and metformin. She has just started checking blood sugars regularly- 3-4 times per day. Reviewed recommendations to check four times a day. I gave her log book at last appointment and she has been using. Post-prandials elevated- will likely need transition to insulin. She did start daily ASA 81 mg and tolerating well. I discussed care with Ariane Cervantes with diabetes education/endocrinology- who has virtual meeting later today. Patient also has appointment with M next Tuesday- will need close monitoring throughout pregnancy including  surveillance and growth US.      Phone call duration: 18 minutes         Dianne Vasquez MD

## 2021-06-07 NOTE — PROGRESS NOTES
[facility] Gestational Diabetes Care Plan    Assessment: Spoke with [facility] via telephone through a professional  for her Diabetes education follow-up today.    She is currently taking Metformin 1000mg and Glipizde XL 10mg twice daily after meals.    She is eating 2 meals daily, her first meal is of rice, soup, meat and vegetables.  Her second meal is very similar.  Stated she is eating about a handful of rice at each meal.  This was harder to assess over the telephone.  She is not eating between her meals, although when asked, stated she does get hungry.  Discussed having some fruit or meat or vegetables during her day.    She is checking her blood sugar 3 times a day, fasting and after each meal.  Stated her fasting readings have been 100-110 and after meal readings have been over 200 most of the time.  Her highest reading since she started checking again was 303    All additional questions and concerns addressed today.    Plan: Consulted with Endocrinology.  Discussed stopping Glipizide and decreasing Metformin to 1 1000mg tablet daily.  Added Lantus 12 units at bedtime and Humalog 10 units before each meal.  She did have insulin pen teaching previously and did not have any additional questions.  Will get her scheduled into pregnancy clinic.    Provider: Christina  Provider's Diagnosis (per referral form) DMT2 during pregnancy    Weight:    Lab Results   Component Value Date    HGBA1C 7.4 (H) 03/25/2020     EDC: Estimated Date of Delivery: 10/14/20  Pregnancy number: 6  Previous GDM: No    Medications:   Current Outpatient Medications:      aspirin 81 MG EC tablet, Take 1 tablet (81 mg total) by mouth daily., Disp: 90 tablet, Rfl: 2     blood glucose test (CONTOUR TEST STRIPS) strips, Use 1 each As Directed 4 (four) times a day. Dispense brand per patient's insurance at pharmacy discretion., Disp: 100 strip, Rfl: 3     generic lancets (FINGERSTIX LANCETS), Use 1 each As Directed 4 (four) times a day.  "Dispense brand per patient's insurance, Disp: 100 each, Rfl: 3     glipiZIDE (GLUCOTROL XL) 10 MG 24 hr tablet, Take 1 tablet (10 mg total) by mouth 2 (two) times a day with meals., Disp: 60 tablet, Rfl: 6     metFORMIN (GLUCOPHAGE) 1000 MG tablet, Take 1 tablet (1,000 mg total) by mouth 2 (two) times a day with meals., Disp: 180 tablet, Rfl: 3     prenatal vit-iron fum-folic ac (PRENATAL VITAMIN) 27 mg iron- 0.8 mg Tab tablet, Take 1 tablet by mouth daily., Disp: 100 tablet, Rfl: 3     acetaminophen (TYLENOL) 325 MG tablet, Take 2 tablets (650 mg total) by mouth every 6 (six) hours as needed for pain., Disp: 100 tablet, Rfl: 1     insulin glargine (LANTUS SOLOSTAR U-100 INSULIN) 100 unit/mL (3 mL) pen, Take 12 units of insulin every night before bed.  Increase as directed.  Max daily dose 40 units., Disp: 4 adj dose pen, Rfl: 2     insulin lispro (HUMALOG KWIKPEN INSULIN) 100 unit/mL pen, Take 10 units before each meal every day.  Increase as directed.  Max daily dose 50 units., Disp: 5 adj dose pen, Rfl: 2     omeprazole (PRILOSEC) 20 MG capsule, Take 1 capsule (20 mg total) by mouth daily before breakfast., Disp: 30 capsule, Rfl: 11     pen needle, diabetic 32 gauge x 5/32\" Ndle, Use 1 each As Directed 3 (three) times a day., Disp: 100 each, Rfl: 1  PNV: Yes  Supplements: No    BG monitoring goals: Fasting <95; 1 hour post start of meal <140. Test 4 x per day.  Check fasting a.m. ketones: No  GDM meal pattern/carb counting taught per guidelines: Yes    Time: 40 minutes DSMT  Visit Type: GDM Individual Follow-up  Visit #: 1      Nessa Cervantes  4/1/2020    DIABETES CARE PLAN AND EDUCATION RECORD    Gestational Diabetes Disease Process/Preconception Care/Management During Pregnancy/Postpartum:Assessed and Discussed    Meter (per above goals): Assessed and Discussed    Nutrition Management    Weight: Assessed and Discussed  Portions/Balance: Assessed and discussed  Carb ID/Count: Assessed and discussed  Label " Reading: Assessed and discussed  Menu Planning: Assessed and Discussed  Dining Out: Assessed and Discussed  Physical Activity: Assessed and Discussed  Medications: Assessed and Discussed    Acute Complications: Prevent, Detect, Treat:      Hyperglycemia: Assessed and Discussed  Goal Setting and Problem Solving: Assessed and Discussed  Barriers: Assessed and Discussed  Psychosocial Adjustments: Assessed and Discussed      I agree with the aforementioned diabetes plan.  Betsy [facility]RIEN [facility]  [facility] Endocrinology  4/2/2020  6:14 AM

## 2021-06-07 NOTE — TELEPHONE ENCOUNTER
[facility] Gestational Diabetes Care Plan    Assessment: Does not feel well when number is high  Eating rice vegetables and meat-a plate full of rice  Low readings most likely related to skipping meals  Humalog-16 units  Lantus-16 units    4-16  F 93  B n/a  L 176  D n/a     4-17  F 99  B 170  L n/a  D 127     4-18  F 101  B n/a  L 111  D 109     4-19  F 75  B n/a  L 177  D 228     4-20  F 86  B n/a  L 208  D 146     4-21  F 71  B 225  L n/a  D 229     4-22  F 73  B 146  L 52  D 161     4-23  F 69  B n/a  L 99        Plan: decrease Lantus to 14 units, try to eat less rice.  Continue taking 16 units with meals, ensure eating three meals each day. Follow-up 2 weeks    Provider: Christina/Caitlin  Provider's Diagnosis (per referral form) DMT2 during pregnancy    Weight:    Lab Results   Component Value Date    HGBA1C 7.4 (H) 03/25/2020     EDC: Estimated Date of Delivery: 10/14/20  Pregnancy number: 6  Previous GDM: Yes    Medications:   Current Outpatient Medications:      acetaminophen (TYLENOL) 325 MG tablet, Take 2 tablets (650 mg total) by mouth every 6 (six) hours as needed for pain., Disp: 100 tablet, Rfl: 1     aspirin 81 MG EC tablet, Take 1 tablet (81 mg total) by mouth daily., Disp: 90 tablet, Rfl: 2     blood glucose test (CONTOUR TEST STRIPS) strips, Use 1 each As Directed 4 (four) times a day. Dispense brand per patient's insurance at pharmacy discretion., Disp: 100 strip, Rfl: 3     generic lancets (FINGERSTIX LANCETS), Use 1 each As Directed 4 (four) times a day. Dispense brand per patient's insurance, Disp: 100 each, Rfl: 3     glipiZIDE (GLUCOTROL XL) 10 MG 24 hr tablet, Take 1 tablet (10 mg total) by mouth 2 (two) times a day with meals., Disp: 60 tablet, Rfl: 6     insulin glargine (LANTUS SOLOSTAR U-100 INSULIN) 100 unit/mL (3 mL) pen, Take 12 units of insulin every night before bed.  Increase as directed.  Max daily dose 40 units., Disp: 4 adj dose pen, Rfl: 2     insulin lispro (HUMALOG  "KWIKPEN INSULIN) 100 unit/mL pen, Take 10 units before each meal every day.  Increase as directed.  Max daily dose 50 units., Disp: 5 adj dose pen, Rfl: 2     metFORMIN (GLUCOPHAGE) 1000 MG tablet, Take 1 tablet (1,000 mg total) by mouth 2 (two) times a day with meals., Disp: 180 tablet, Rfl: 3     omeprazole (PRILOSEC) 20 MG capsule, Take 1 capsule (20 mg total) by mouth daily before breakfast., Disp: 30 capsule, Rfl: 11     pen needle, diabetic 32 gauge x 5/32\" Ndle, Use 1 each As Directed 3 (three) times a day., Disp: 100 each, Rfl: 1     prenatal vit-iron fum-folic ac (PRENATAL VITAMIN) 27 mg iron- 0.8 mg Tab tablet, Take 1 tablet by mouth daily., Disp: 100 tablet, Rfl: 3  PNV: Yes  Supplements: No    BG monitoring goals: Fasting <95; 1 hour post start of meal <140. Test 4 x per day.  Check fasting a.m. ketones: No  GDM meal pattern/carb counting taught per guidelines: Yes        Nessa Cervantes  4/23/2020              "

## 2021-06-07 NOTE — PROGRESS NOTES
New OB Clinic Note - 3/25/2020   Visit was completed along with Wendy .   SUBJECTIVE:  Stacey Melendez is a 39 y.o.  female @ 11w0d who is here for new OB visit.   LMP exact with ELDER 10/14/20. Ultrasound at 9w2d gave ELDER 10/12/20  Current Concerns: None.   She has mild nausea.  She denies bleeding, cramping. No loss of fluid. She denies HA.   Denies dysuria or hematuria.   Denies constipation.  OB History:  Patient is [id number]. Prior Pregnancies:  OB History    Para Term  AB Living   6 5 5     4   SAB TAB Ectopic Multiple Live Births           5      # Outcome Date GA Lbr Mitesh/2nd Weight Sex Delivery Anes PTL Lv   6 Current            5 Term    6 lb 9.8 oz (3 kg) F Vag-Spont   MARY ANN   4 Term    6 lb 9.8 oz (3 kg) F Vag-Spont   MARY ANN   3 Term    6 lb 9.8 oz (3 kg) M Vag-Spont   MARY ANN      Complications: Hemorrhage   2 Term    6 lb 9.8 oz (3 kg) F Vag-Spont   MARY ANN   1 Term    6 lb 9.8 oz (3 kg) M Vag-Spont   DEC     She does not have a history of  birth before 37 weeks with a garcia gestation.  She does not have a history of preeclampsia in prior pregnancy.   She does not have a history of recurrent (>2) pregnancy losses.  She does not have a history of Down's syndrome, sickle cell anemia or other genetic disorder affecting a child in her family.  She does not have a history of major depression or postpartum depression.  She does not have a history of STI's including Chlamydia, gonorrhea, Hep B virus, syphilis, HPV, or genital herpes.   Social Hx:   She has good support from her family and father of baby, , Sa Slater, is involved.   She is working as PCA  She has not used tobacco, has not used EtOH and has not used illicit drugs.  Current Medications: prenatal vitamins, glipizide 10 mg daily, metformin  She has stopped taking lisinopril, statin, januvia, since becoming aware of her pregnancy.  Past Medical History:   Diagnosis Date     Diabetes mellitus (H)       "Hypertriglyceridemia      History reviewed. No pertinent surgical history.  Family History   Problem Relation Age of Onset     Hyperlipidemia Mother      Diabetes Mother      Cancer Neg Hx      Breast cancer Neg Hx      Hypertension Neg Hx      Current Outpatient Medications on File Prior to Visit   Medication Sig Dispense Refill     glipiZIDE (GLUCOTROL XL) 10 MG 24 hr tablet Take 1 tablet (10 mg total) by mouth 2 (two) times a day with meals. 60 tablet 6     metFORMIN (GLUCOPHAGE) 1000 MG tablet Take 1 tablet (1,000 mg total) by mouth 2 (two) times a day with meals. 180 tablet 3     omeprazole (PRILOSEC) 20 MG capsule Take 1 capsule (20 mg total) by mouth daily before breakfast. 30 capsule 11     No current facility-administered medications on file prior to visit.      OBJECTIVE:  Vitals:    03/25/20 1214   BP: 120/70   Patient Site: Right Arm   Patient Position: Sitting   Cuff Size: Adult Regular   Pulse: 88   Resp: 20   Temp: 98.3  F (36.8  C)   TempSrc: Oral   Weight: 138 lb (62.6 kg)   Height: 4' 10.25\" (1.48 m)     Gen: Awake, alert, in no acute distress  CV: RRR with normal S1 and S2. No murmurs appreciated.  Resp: Lungs are clear to auscultation bilaterally without crackles or wheezing.  Abd: non-tender, non-distended. Bowel sounds present.   Pelvic Exam: Vagina and vulva are normal; no discharge is noted. Not due for pap smear today  Lower extremities: No edema  Neuro: No focal deficits  's  Results for orders placed or performed in visit on 03/25/20   Culture, Urine    Specimen: Urine   Result Value Ref Range    Culture No Growth    ABO/RH Typing (OP order)   Result Value Ref Range    HML ABO/Rh Typing A POS     HML ABO/Rh Repeat Typing A POS    Hepatitis B Surface antigen (HBsAG)   Result Value Ref Range    Hepatitis B Surface Ag Negative Negative   HIV Antigen/Antibody Screening Cascade   Result Value Ref Range    HIV Antigen / Antibody Negative Negative   HML Antibody Screen   Result Value Ref " Range    HML Antibody Screen Negative    RPR   Result Value Ref Range    Treponema Antibody (Syphilis) Negative Negative   Urinalysis Macroscopic   Result Value Ref Range    Color, UA Yellow Colorless, Yellow, Straw, Light Yellow    Clarity, UA Clear Clear    Glucose,  mg/dL (!) Negative    Bilirubin, UA Negative Negative    Ketones, UA Negative Negative    Specific Gravity, UA 1.020 1.005 - 1.030    Blood, UA Negative Negative    pH, UA 7.0 5.0 - 8.0    Protein, UA Negative Negative mg/dL    Urobilinogen, UA 0.2 E.U./dL 0.2 E.U./dL, 1.0 E.U./dL    Nitrite, UA Negative Negative    Leukocytes, UA Negative Negative   Drugs of Abuse 1,Urine   Result Value Ref Range    Amphetamines Screen Negative Screen Negative    Benzodiazepines Screen Negative Screen Negative    Opiates Screen Negative Screen Negative    Phencyclidine Screen Negative Screen Negative    THC Screen Negative Screen Negative    Barbiturates Screen Negative Screen Negative    Cocaine Metabolite Screen Negative Screen Negative    Oxycodone Screen Negative Screen Negative    Creatinine, Urine 51.9 mg/dL   Glycosylated Hemoglobin A1c   Result Value Ref Range    Hemoglobin A1c 7.4 (H) 3.5 - 6.0 %   HM1 (CBC with Diff)   Result Value Ref Range    WBC 10.2 4.0 - 11.0 thou/uL    RBC 4.27 3.80 - 5.40 mill/uL    Hemoglobin 13.0 12.0 - 16.0 g/dL    Hematocrit 39.3 35.0 - 47.0 %    MCV 92 80 - 100 fL    MCH 30.4 27.0 - 34.0 pg    MCHC 33.1 32.0 - 36.0 g/dL    RDW 11.9 11.0 - 14.5 %    Platelets 243 140 - 440 thou/uL    MPV 10.2 8.5 - 12.5 fL    Neutrophils % 72 (H) 50 - 70 %    Lymphocytes % 21 20 - 40 %    Monocytes % 6 2 - 10 %    Eosinophils % 1 0 - 6 %    Basophils % 0 0 - 2 %    Neutrophils Absolute 7.3 2.0 - 7.7 thou/uL    Lymphocytes Absolute 2.1 0.8 - 4.4 thou/uL    Monocytes Absolute 0.6 0.0 - 0.9 thou/uL    Eosinophils Absolute 0.1 0.0 - 0.4 thou/uL    Basophils Absolute 0.0 0.0 - 0.2 thou/uL   Protein/Creatinine Ratio, Urine   Result Value Ref  Range     Protein, Random Urine 8 mg/dL    Creatinine, Urine 51.9 mg/dL    Protein/Creatinine Ratio, Random Urine 0.15      ASSESSMENT/PLAN:  Stacey Melendez is a 39 y.o.  at 11w0d weeks by exact LMP c/w 9 week US. Estimated Date of Delivery: 10/14/20.   1. Discussed recommended weight gain, healthy eating habits, exercise, common first trimester concerns (nausea, vomiting, constipation, fatigue, GERD, urinary frequency) and warning signs for miscarriage and  labor (bleeding, cramping).   2. Continue prenatal vitamins  3. Pre-gestational diabetes: Rechecked A1C today. Pre-pregnancy BMI 28. Discussed weight gain goals and increasing 2 meals to 3 small meals. Discussed carbohydrate portions briefly. She was not previously taking medications regularly- she is now taking only metformin and glipizide and fastings 94, 99, 126. Discussed diabetes in pregnancy, risks,  surveillance. Will need growth US. Start ASA 81 mg daily. Referred to diabetes education and MFM. She will likely need transition to insulin at some point during pregnancy- had MTM pharmacist come to room and do teaching today. She will check sugars four times a day (gave log book) for the next week- will adjust medications via telephone visit next week.    4. Pelvic exam including GC, Chlamydia and PAP (if >21yrs) was completed.  5. Prenatal labs completed - prenatal profile, UA/UC, HIV   6. Obtained baseline urine protein/creatinine if indicated for history of preeclampsia, chronic HTN or chronic renal disease and baseline Hgb A1C in indicated for history of gestational diabetes, chronic diabetes, obesity.  7. Reviewed eligibility for low-dose aspirin therapy for prevention of preeclampsia (preeclampsia in prior pregnancy, pre-existing HTN or DM, or multiple other risk factors including  delivery, chronic HTN, DM, obesity, low socioeconomic status, non- ethnicity). Patient is a candidate for this.   8. Reviewed eligibility  for 17-hydroxyprogesterone therapy for prevention of  birth (prior garcia delivery before 37 weeks). Patient is not a candidate for this.   9. AMA: progenity done today  10. Counseled on benefits of breastfeeding. Patient is planning to breastfeed.   11. Discussed diet, exercise, regular prenatal visits.  Stacey was seen today for initial prenatal visit.    Diagnoses and all orders for this visit:    Supervision of high-risk pregnancy of elderly multigravida: Labs and cell free DNA obtained.  -     ABO/RH Typing (OP order)  -     Hepatitis B Surface antigen (HBsAG)  -     HIV Antigen/Antibody Screening Cascade  -     HM1(CBC and Differential)  -     HML Antibody Screen  -     RPR  -     Rubella Immune Status (IgG)  -     Urinalysis Macroscopic  -     Culture, Urine  -     Chlamydia/gonorrhoeae CERVIX  -     Drugs of Abuse 1,Urine  -     Glycosylated Hemoglobin A1c  -     HM1 (CBC with Diff)  -     Protein/Creatinine Ratio, Urine  -     aspirin 81 MG EC tablet; Take 1 tablet (81 mg total) by mouth daily.  -     Ambulatory referral to [facility]- Pregnancy  -     Ambulatory referral to Diabetes Education Program (CDE)  -     Lead, Blood  -     Lead, Blood, Venous    Type 2 diabetes mellitus without complication, without long-term current use of insulin (H): See above for details. Refilled strips and lancets and called pharmacy- they will delivery these to patient.  -     Glycosylated Hemoglobin A1c  -     generic lancets (FINGERSTIX LANCETS); Use 1 each As Directed 4 (four) times a day. Dispense brand per patient's insurance  -     blood glucose test (CONTOUR TEST STRIPS) strips; Use 1 each As Directed 4 (four) times a day. Dispense brand per patient's insurance at pharmacy discretion.  -     Ambulatory referral to [facility]- Pregnancy  -     acetaminophen (TYLENOL) 325 MG tablet; Take 2 tablets (650 mg total) by mouth every 6 (six) hours as needed for pain.  -     prenatal vit-iron fum-folic ac (PRENATAL VITAMIN) 27 mg  iron- 0.8 mg Tab tablet; Take 1 tablet by mouth daily.      ?  RTC in 4 weeks or sooner if problems. At next visit: review glucose readings and adjust medications as needed    Dianne Vasquez MD    Options for treatment and follow-up care were reviewed with the patient and/or guardian. Stacey Arteaga Paw and/or guardian engaged in the decision making process and verbalized understanding of the options discussed and agreed with the final plan.

## 2021-06-07 NOTE — PROGRESS NOTES
"SUBJECTIVE:  Stacey Melendez is a 39 y.o. female  at 15w2d by LMP, consistent with 1st tri u/s. Estimated Date of Delivery: 10/14/20.  She is doing ok. \"Sometimes I feel good, sometimes not.\" She has some pain in both forearms and wrists. Sometimes her fingers hurt and tingle, too.  She denies abdominal pain/cramping, vaginal bleeding, LOF. Fetal movement is present.   Concerns: pain in forearms/wrists is her biggest concern. Also has pre-existing Type 2 DM. She has been doing phone visits with Betsy Joy NP of Endocrinology. They have made adjustments in her insulin and she is also no longer taking glipizide. They are having phone visits each week.   OBJECTIVE:  Blood pressure 110/72, pulse 81, temperature 98.8  F (37.1  C), temperature source Oral, resp. rate 16, height 4' 10.25\" (1.48 m), weight 141 lb (64 kg), last menstrual period 2020, SpO2 98 %, not currently breastfeeding.  Gen: comfortable, no acute distress.  See OB Vitals flowsheet.  ASSESSMENT/PLAN:  Stacey was seen today for routine prenatal visit and medication refill.    Diagnoses and all orders for this visit:    High-risk pregnancy, elderly multigravida, unspecified trimester: pt's NIPT was negative for aneuploidy but she does desire quad screen to check for neural tube defects. Ordered today. Also referred to [facility] for fetal survey to be done between 18-20 weeks.   -     Ambulatory referral to [facility]- Pregnancy  -     Maternal Serum Screen, Alpha Fetoprotein, hCG, Estriol, and Inhibin A (Quad)    Pre-existing type 2 diabetes mellitus during pregnancy in second trimester: instructions for insulin dosage, from note by Betsy Joy NP, are as follows:   -Lantus insulin 14   units. Increase by 2 units every 2 days to keep fasting blood glucose below 95mg/dL  -Novolog 16  units with breakfast  -Novolog 16 units with lunch   -Novolog 16 units with dinner  -Increase by 2 units every 2 days to keep 1 hour after meal blood glucose less than " 140mg/dL  -     Ambulatory referral to [facility]- Pregnancy  -     Maternal Serum Screen, Alpha Fetoprotein, hCG, Estriol, and Inhibin A (Quad)    Bilateral carpal tunnel syndrome: this diagnosis was on her problem list already and I think this is likely the cause of her wrist and finger pain. Will try bilateral wrist splints.   -     Wrist/Arm DME: Wrist Brace; Bilateral; non-thumb spica    RTC in 4 weeks or sooner with problems.    Visit completed along with assistance of telephone Wendy .    Kandis Tucker MD

## 2021-06-07 NOTE — PROGRESS NOTES
"Stacey Melendez is a 39 y.o. female who is being evaluated via a billable telephone visit.      The patient has been notified of following:     \"This telephone visit will be conducted via a call between you and your physician/provider. We have found that certain health care needs can be provided without the need for a physical exam.  This service lets us provide the care you need with a short phone conversation.  If a prescription is necessary we can send it directly to your pharmacy.  If lab work is needed we can place an order for that and you can then stop by our lab to have the test done at a later time.    Telephone visits are billed at different rates depending on your insurance coverage. During this emergency period, for some insurers they may be billed the same as an in-person visit.  Please reach out to your insurance provider with any questions.    If during the course of the call the physician/provider feels a telephone visit is not appropriate, you will not be charged for this service.\"    Patient has given verbal consent to a Telephone visit? Yes    Patient would like to receive their AVS by AVS Preference: Mail a copy.    Additional provider notes:       Reason for visit      1. Pre-existing type 2 diabetes mellitus during pregnancy in second trimester        HPI     Stacey Melendez is a very pleasant 39 y.o. old female who presents for management of Diabetes Mellitus during pregnancy.  She is currently 14w1d  weeks pregnant . Due date is 10/14/2020    Current carbohydrate intake:consistent with recommendations of 30g-60g-60g.  I have reviewed her blood glucose logs and note that the:  Fasting readings  are:above range on current regimen  Postprandial readings are:above range on current regimen  Current Lantus dose: 16  Current Prandial insulin:   Blood glucose logs/meter brought in and data reviewed and incorporated into decision-making.  Planned delivery at: Unknown  OB[facility]N: " [facility]    Therapy/Interventions in the past:  She has been seen by the Diabetes Educator- and has received instruction on carbohydrate counting and  consistency.  Records from referring provider and other sources have also been reviewed and incorporated into decision-making.      TODAY:  Pt is contacted today in f/u for managing DM 2 during pregnancy.  We are joined by a professional  and SOTERO Caraballo.  Pt has provided BG today and she is dropping at least one reading a day. She has us on Speaker with her phone today and there is a lot of chaos in the background, unfortunately. Our  also seems to be unfamiliar with diabetes in pregnancy. Pt has had three low FBS this week, so we will decrease her HS insulin dosage. She has had some postprandial elevations. She reports that she has been taking the prandial insulin after she eats, however because of poor interpretation, we are unsure if this is factual or what has been reported. Instructed to take insulin at the beginning of meal, and testing consistently 1 hour or 2 hours after starting to eat.     4-16  F 93  B n/a  L 176  D n/a    4-17  F 99  B 170  L n/a  D 127    4-18  F 101  B n/a  L 111  D 109    4-19  F 75  B n/a  L 177  D 228    4-20  F 86  B n/a  L 208  D 146    4-21  F 71  B 225  L n/a  D 229    4-22  F 73  B 146  L 52  D 161    4-23  F 69  B n/a  L 99      Past Medical History       Patient Active Problem List   Diagnosis     Bilateral carpal tunnel syndrome     Gastroesophageal reflux disease with esophagitis     Learning problem     Diabetes (H)     Hyperlipidemia LDL goal <100     High-risk pregnancy, elderly multigravida, unspecified trimester     Pre-existing type 2 diabetes mellitus during pregnancy in second trimester        Past Surgical History     No past surgical history on file.    Family History     Family History   Problem Relation Age of Onset     Hyperlipidemia Mother      Diabetes Mother      Cancer Neg Hx       Breast cancer Neg Hx      Hypertension Neg Hx        Social History     Social History     Tobacco Use     Smoking status: Never Smoker     Smokeless tobacco: Former User     Types: Chew     Tobacco comment: chews betel nut NO Tobacco   Substance Use Topics     Alcohol use: No     Drug use: No       Review of Systems     Patient has no polyuria or polydipsia, no chest pain, dyspnea or TIA's, no numbness, tingling or pain in extremities  Remainder negative except as noted in HPI.      Vital Signs     LMP 2020 (Exact Date)   Wt Readings from Last 3 Encounters:   20 141 lb (64 kg)   20 138 lb (62.6 kg)   20 135 lb (61.2 kg)           Assessment     1. Pre-existing type 2 diabetes mellitus during pregnancy in second trimester        Plan     1.  DIABETES in pregnancy -  Adjust dose as follows:     -Lantus insulin 14   units. Increase by 2 units every 2 days to keep fasting blood glucose below 95mg/dL  -Novolog 16  units with breakfast  -Novolog 16 units with lunch   -Novolog 16 units with dinner  -Increase by 2 units every 2 days to keep 1 hour after meal blood glucose less than 140mg/dL     We reviewed glucose goals of fasting blood glucose <95 mg/dL and 1 hour post prandial blood glucose of <140 mg/dL.    Monitor blood sugar 4 times daily: Fasting  and 1 hour after each meal.  Contact  this clinic [phone] if blood glucose is not within the above-mentioned goals.      We discussed the importance of excellent glycemic control during pregnancy to limit complications such as fetal macrosomia, shoulder dystocia,  hypoglycemia and hyperbilirubinemia.  I have discussed the patient's increased risk of recurrent GDM and/or development of type 2 diabetes later in life.          Confirmed and re-confirmed understanding with pt through , after disconnection and restarted with another . We will connect again next week.            Lab Results     Hemoglobin A1c   Date  Value Ref Range Status   03/25/2020 7.4 (H) 3.5 - 6.0 % Final   02/07/2020 8.2 (H) 3.5 - 6.0 % Final   11/06/2019 6.8 (H) 3.5 - 6.0 % Final   08/27/2019 8.7 (H) 3.5 - 6.0 % Final   05/09/2019 8.1 (H) 3.5 - 6.0 % Final     Creatinine   Date Value Ref Range Status   04/12/2019 0.75 0.60 - 1.10 mg/dL Final   12/26/2018 0.73 0.60 - 1.10 mg/dL Final   10/08/2018 0.84 0.60 - 1.10 mg/dL Final     Microalbumin, Random Urine   Date Value Ref Range Status   01/07/2020 2.24 (H) 0.00 - 1.99 mg/dL Final       Cholesterol   Date Value Ref Range Status   02/07/2020 182 <=199 mg/dL Final     HDL Cholesterol   Date Value Ref Range Status   02/07/2020 43 (L) >=50 mg/dL Final     LDL Calculated   Date Value Ref Range Status   02/07/2020 67 <=129 mg/dL Final     Triglycerides   Date Value Ref Range Status   02/07/2020 361 (H) <=149 mg/dL Final       Lab Results   Component Value Date    ALT 35 12/26/2018    AST 25 12/26/2018    ALKPHOS 56 12/26/2018    BILITOT 0.8 12/26/2018         Current Medications     Outpatient Medications Prior to Visit   Medication Sig Dispense Refill     acetaminophen (TYLENOL) 325 MG tablet Take 2 tablets (650 mg total) by mouth every 6 (six) hours as needed for pain. 100 tablet 1     aspirin 81 MG EC tablet Take 1 tablet (81 mg total) by mouth daily. 90 tablet 2     blood glucose test (CONTOUR TEST STRIPS) strips Use 1 each As Directed 4 (four) times a day. Dispense brand per patient's insurance at pharmacy discretion. 100 strip 3     generic lancets (FINGERSTIX LANCETS) Use 1 each As Directed 4 (four) times a day. Dispense brand per patient's insurance 100 each 3     insulin glargine (LANTUS SOLOSTAR U-100 INSULIN) 100 unit/mL (3 mL) pen Take 12 units of insulin every night before bed.  Increase as directed.  Max daily dose 40 units. 4 adj dose pen 2     insulin lispro (HUMALOG KWIKPEN INSULIN) 100 unit/mL pen Take 10 units before each meal every day.  Increase as directed.  Max daily dose 50 units. 5 adj  "dose pen 2     metFORMIN (GLUCOPHAGE) 1000 MG tablet Take 1 tablet (1,000 mg total) by mouth 2 (two) times a day with meals. 180 tablet 3     omeprazole (PRILOSEC) 20 MG capsule Take 1 capsule (20 mg total) by mouth daily before breakfast. 30 capsule 11     pen needle, diabetic 32 gauge x 5/32\" Ndle Use 1 each As Directed 3 (three) times a day. 100 each 1     prenatal vit-iron fum-folic ac (PRENATAL VITAMIN) 27 mg iron- 0.8 mg Tab tablet Take 1 tablet by mouth daily. 100 tablet 3     glipiZIDE (GLUCOTROL XL) 10 MG 24 hr tablet Take 1 tablet (10 mg total) by mouth 2 (two) times a day with meals. 60 tablet 6     No facility-administered medications prior to visit.          Total time of call between patient and provider was 28 minutes      Emile ANDERSON      "

## 2021-06-08 NOTE — PROGRESS NOTES
"Stacey Melendez is a 40 y.o. female who is being evaluated via a billable telephone visit.      The patient has been notified of following:     \"This telephone visit will be conducted via a call between you and your physician/provider. We have found that certain health care needs can be provided without the need for a physical exam.  This service lets us provide the care you need with a short phone conversation.  If a prescription is necessary we can send it directly to your pharmacy.  If lab work is needed we can place an order for that and you can then stop by our lab to have the test done at a later time.    Telephone visits are billed at different rates depending on your insurance coverage. During this emergency period, for some insurers they may be billed the same as an in-person visit.  Please reach out to your insurance provider with any questions.    If during the course of the call the physician/provider feels a telephone visit is not appropriate, you will not be charged for this service.\"    Patient has given verbal consent to a Telephone visit? Yes    What phone number would you like to be contacted at?     Patient would like to receive their AVS by AVS Preference: Juliocesar.    Additional provider notes:       Reason for visit      1. Pre-existing diabetes mellitus during pregnancy in second trimester        HPI     Stacey Melendez is a very pleasant 39 y.o. old female who presents for management of Diabetes Mellitus during pregnancy.  She is currently 20w1d  weeks pregnant . Due date is 10/14/2020    Current carbohydrate intake:consistent with recommendations of 30g-60g-60g.  I have reviewed her blood glucose logs and note that the:  Fasting readings  are:above range on current regimen  Postprandial readings are:above range on current regimen  Current Lantus dose: 14  Current Prandial insulin: 16/0/0  Blood glucose logs/meter brought in and data reviewed and incorporated into decision-making.  Planned " delivery at: Unknown  [facility]N: [facility]      Therapy/Interventions in the past:  She has been seen by the Diabetes Educator- and has received instruction on carbohydrate counting and  consistency.  Records from referring provider and other sources have also been reviewed and incorporated into decision-making.      TODAY:  [facility] is contacted today in f/u for management of DM 2 during pregnancy. We are joined by Ariane Cervantes, and a professional . After some discussion, Ariane realized that pt is only using Lantus and hasn't picket up prandial insulin. We will work with what we have. Pt instructed to take 22 units in the moring and 18 units in the evening. Pt reports no concerns from OB. Baby is moving appropriately and she is having no swelling. We will talk with her again next week.       Blood Sugar Readings:    5-21  F 97  B 235  L 154      5-22  F 99  B 215    5-23  F 82    5-24  F 97    5-25  F 94  12:00p 172      5-26  F 91  189  PM 72    5-27  F 103  145      5-28  F 98  159      Past Medical History       Patient Active Problem List   Diagnosis     Bilateral carpal tunnel syndrome     Gastroesophageal reflux disease with esophagitis     Learning problem     Diabetes (H)     Hyperlipidemia LDL goal <100     High-risk pregnancy, elderly multigravida, unspecified trimester     Pre-existing type 2 diabetes mellitus during pregnancy in second trimester        Past Surgical History     No past surgical history on file.    Family History     Family History   Problem Relation Age of Onset     Hyperlipidemia Mother      Diabetes Mother      Cancer Neg Hx      Breast cancer Neg Hx      Hypertension Neg Hx        Social History     Social History     Tobacco Use     Smoking status: Never Smoker     Smokeless tobacco: Former User     Types: Chew     Tobacco comment: chews betel nut NO Tobacco   Substance Use Topics     Alcohol use: No     Drug use: No       Review of Systems     Patient has no  polyuria or polydipsia, no chest pain, dyspnea or TIA's, no numbness, tingling or pain in extremities  Remainder negative except as noted in HPI.      Vital Signs     LMP 2020 (Exact Date)   Wt Readings from Last 3 Encounters:   20 140 lb (63.5 kg)   20 141 lb (64 kg)   20 138 lb (62.6 kg)           Assessment     1. Pre-existing diabetes mellitus during pregnancy in second trimester        Plan       1.  DIABETES in pregnancy -  Adjust dose as follows:     -Lantus insulin 22 AM, 18 PM. Increase by 2 units every 2 days to keep fasting blood glucose below 95mg/dL     We reviewed glucose goals of fasting blood glucose <95 mg/dL and 1 hour post prandial blood glucose of <140 mg/dL.    Monitor blood sugar 4 times daily: Fasting  and 1 hour after each meal.  Contact  this clinic [phone] if blood glucose is not within the above-mentioned goals.      We discussed the importance of excellent glycemic control during pregnancy to limit complications such as fetal macrosomia, shoulder dystocia,  hypoglycemia and hyperbilirubinemia.  I have discussed the patient's increased risk of recurrent GDM and/or development of type 2 diabetes later in life.          F/u in 1 week.        Lab Results     Hemoglobin A1c   Date Value Ref Range Status   2020 7.4 (H) 3.5 - 6.0 % Final   2020 8.2 (H) 3.5 - 6.0 % Final   2019 6.8 (H) 3.5 - 6.0 % Final   2019 8.7 (H) 3.5 - 6.0 % Final   2019 8.1 (H) 3.5 - 6.0 % Final     Creatinine   Date Value Ref Range Status   2020 0.62 0.60 - 1.10 mg/dL Final   2019 0.75 0.60 - 1.10 mg/dL Final   2018 0.73 0.60 - 1.10 mg/dL Final     Microalbumin, Random Urine   Date Value Ref Range Status   2020 2.24 (H) 0.00 - 1.99 mg/dL Final       Cholesterol   Date Value Ref Range Status   2020 182 <=199 mg/dL Final     HDL Cholesterol   Date Value Ref Range Status   2020 43 (L) >=50 mg/dL Final     LDL Calculated  "  Date Value Ref Range Status   02/07/2020 67 <=129 mg/dL Final     Triglycerides   Date Value Ref Range Status   02/07/2020 361 (H) <=149 mg/dL Final       Lab Results   Component Value Date    ALT 10 05/22/2020    AST 15 05/22/2020    ALKPHOS 56 12/26/2018    BILITOT 0.8 12/26/2018         Current Medications     Outpatient Medications Prior to Visit   Medication Sig Dispense Refill     acetaminophen (TYLENOL) 325 MG tablet Take 2 tablets (650 mg total) by mouth every 6 (six) hours as needed for pain. 100 tablet 1     aspirin 81 MG EC tablet Take 1 tablet (81 mg total) by mouth daily. 90 tablet 2     blood glucose test (CONTOUR TEST STRIPS) strips Use 1 each As Directed 4 (four) times a day. Dispense brand per patient's insurance at pharmacy discretion. 100 strip 3     calcium, as carbonate, (TUMS) 200 mg calcium (500 mg) chewable tablet Chew 1 tablet (200 mg total) daily. 100 tablet 3     generic lancets (FINGERSTIX LANCETS) Use 1 each As Directed 4 (four) times a day. Dispense brand per patient's insurance 100 each 3     insulin lispro (HUMALOG KWIKPEN INSULIN) 100 unit/mL pen Take 10 units before each meal every day.  Increase as directed.  Max daily dose 50 units. 5 adj dose pen 2     metFORMIN (GLUCOPHAGE) 1000 MG tablet Take 1 tablet (1,000 mg total) by mouth 2 (two) times a day with meals. 180 tablet 3     omeprazole (PRILOSEC) 20 MG capsule Take 1 capsule (20 mg total) by mouth daily before breakfast. 30 capsule 11     pen needle, diabetic 32 gauge x 5/32\" Ndle Use 1 each As Directed 3 (three) times a day. 100 each 1     prenatal vit-iron fum-folic ac (PRENATAL VITAMIN) 27 mg iron- 0.8 mg Tab tablet Take 1 tablet by mouth daily. 100 tablet 3     insulin glargine (LANTUS SOLOSTAR U-100 INSULIN) 100 unit/mL (3 mL) pen Take 12 units of insulin every night before bed.  Increase as directed.  Max daily dose 40 units. 4 adj dose pen 2     No facility-administered medications prior to visit.            Phone call " duration: 19 minutes    Emile BLANCHARD-NADEEN

## 2021-06-08 NOTE — PROGRESS NOTES
"Please see \"Imaging\" tab under Chart Review for full report.  This ultrasound was performed in the [facility], and may be located under Care Everywhere.    Kalynai Sierra MD  Maternal Fetal Medicine    "

## 2021-06-08 NOTE — TELEPHONE ENCOUNTER
NIKI GDM Care Plan      Assessment/Plan: no swelling   can feel baby moving  14-16 units at bedtime  Before meals-16 units  Saw  Has no concerns  Did take insulin last night, reading was 235, just rice  Blood Sugar Readings:     4-29  F 82     5-1  F 77  D 124 - 2 hours     5-2  F 104  B 252  D 152     5-3  F 82  L 75  D 133     5-4  F 80  B 290  D 143     5-5  F 97  L 105  D 89     5-6  F 90  L 118  D 235     5-7  F 75  Continue with current doses.  Again suggested having a snack before bed as she does not eat after 4pm currently.  Follow-up in 2 weeks.          Visit Type: pregnancy clinic   Provider: Bebeto  Provider's Diagnosis (per referral form): Gestational (648.83)    Weight:    Lab Results   Component Value Date    HGBA1C 7.4 (H) 03/25/2020       Estimated Date of Delivery: 10/14/20   Pregnancy #: 6  Previous GDM: Yes   Medications:   Current Outpatient Medications:      acetaminophen (TYLENOL) 325 MG tablet, Take 2 tablets (650 mg total) by mouth every 6 (six) hours as needed for pain., Disp: 100 tablet, Rfl: 1     aspirin 81 MG EC tablet, Take 1 tablet (81 mg total) by mouth daily., Disp: 90 tablet, Rfl: 2     blood glucose test (CONTOUR TEST STRIPS) strips, Use 1 each As Directed 4 (four) times a day. Dispense brand per patient's insurance at pharmacy discretion., Disp: 100 strip, Rfl: 3     calcium, as carbonate, (TUMS) 200 mg calcium (500 mg) chewable tablet, Chew 1 tablet (200 mg total) daily., Disp: 100 tablet, Rfl: 3     generic lancets (FINGERSTIX LANCETS), Use 1 each As Directed 4 (four) times a day. Dispense brand per patient's insurance, Disp: 100 each, Rfl: 3     insulin glargine (LANTUS SOLOSTAR U-100 INSULIN) 100 unit/mL (3 mL) pen, Take 12 units of insulin every night before bed.  Increase as directed.  Max daily dose 40 units., Disp: 4 adj dose pen, Rfl: 2     insulin lispro (HUMALOG KWIKPEN INSULIN) 100 unit/mL pen, Take 10 units before each meal every day.  Increase as directed.   "Max daily dose 50 units., Disp: 5 adj dose pen, Rfl: 2     metFORMIN (GLUCOPHAGE) 1000 MG tablet, Take 1 tablet (1,000 mg total) by mouth 2 (two) times a day with meals., Disp: 180 tablet, Rfl: 3     omeprazole (PRILOSEC) 20 MG capsule, Take 1 capsule (20 mg total) by mouth daily before breakfast., Disp: 30 capsule, Rfl: 11     pen needle, diabetic 32 gauge x 5/32\" Ndle, Use 1 each As Directed 3 (three) times a day., Disp: 100 each, Rfl: 1     prenatal vit-iron fum-folic ac (PRENATAL VITAMIN) 27 mg iron- 0.8 mg Tab tablet, Take 1 tablet by mouth daily., Disp: 100 tablet, Rfl: 3      BG monitoring goals: Fasting <95; 1 hour post start of meal <140. Test 4 x per day.  Check fasting a.m. ketones: No  GDM meal pattern/carb counting taught per guidelines: Yes    Past Goals:  Nutrition: GDM meal plan -Unmet  Activity: Walking after meals when able/staying active 0Met  Monitoring: BG 4x/day as directed, ketones every morning -Met      New Goals:  Nutrition: GDM meal plan   Activity: Walking after meals when able/staying active   Monitoring: BG 4x/day as directed, ketones every morning    DIABETES CARE PLAN AND EDUCATION RECORD    Gestational Diabetes Disease Process/Preconception Care/Management During Pregnancy/Postpartum:Discussed    Meter (per above goals): Assessed and Discussed    Nutrition Management    Weight: Assessed and Discussed  Portions/Balance: Assessed and Discussed  Carb ID/Count: Assessed and Discussed  Label Reading: Assessed and Discussed  Menu Planning: Assessed and Discussed  Dining Out: Assessed and Discussed  Physical Activity: Assessed and Discussed    Acute Complications: Prevent, Detect, Treat:    Goal Setting and Problem Solving: Assessed and Discussed  Barriers: Assessed and Discussed  Psychosocial Adjustments: Assessed and Discussed    I agree with the aforementioned diabetes plan.  Betsy OBRIEN [facility]  5/7/2020  2:30 PM    "

## 2021-06-08 NOTE — PROGRESS NOTES
"Stacey Melendez is a 39 y.o. female who is being evaluated via a billable telephone visit.      The patient has been notified of following:     \"This telephone visit will be conducted via a call between you and your physician/provider. We have found that certain health care needs can be provided without the need for a physical exam.  This service lets us provide the care you need with a short phone conversation.  If a prescription is necessary we can send it directly to your pharmacy.  If lab work is needed we can place an order for that and you can then stop by our lab to have the test done at a later time.    Telephone visits are billed at different rates depending on your insurance coverage. During this emergency period, for some insurers they may be billed the same as an in-person visit.  Please reach out to your insurance provider with any questions.    If during the course of the call the physician/provider feels a telephone visit is not appropriate, you will not be charged for this service.\"    Patient has given verbal consent to a Telephone visit? Yes    What phone number would you like to be contacted at?     Patient would like to receive their AVS by My Chart      Additional provider notes:       Reason for visit      1. Pre-existing type 2 diabetes mellitus during pregnancy in second trimester        HPI        Stacey Melendez is a very pleasant 39 y.o. old female who presents for management of Diabetes Mellitus during pregnancy.  She is currently 17w1d  weeks pregnant . Due date is 10/14/2020    Current carbohydrate intake:consistent with recommendations of 30g-60g-60g.  I have reviewed her blood glucose logs and note that the:  Fasting readings  are:above range on current regimen  Postprandial readings are:above range on current regimen  Current Lantus dose: 14  Current Prandial insulin:   Blood glucose logs/meter brought in and data reviewed and incorporated into decision-making.  Planned " delivery at: Unknown  OBGYN: [facility]    Therapy/Interventions in the past:  She has been seen by the Diabetes Educator- and has received instruction on carbohydrate counting and  consistency.  Records from referring provider and other sources have also been reviewed and incorporated into decision-making.      TODAY:  Stacey is contacted today in f/u for management of DM 2 in pregnancy. We are joined by SOTERO Caraballo and a professional . Stacey continues to have missing data. Pt had a high after dinner and states that she was feeling low, so she just ate until she felt better. She is going from 4 P when she eats dinner, until morning without any snack. Reminded that this is too long and that a high protein snack is important. She reports that baby is moving and that she is having no swelling.  has no current concerns.    Blood Sugar Readings:    4-29  F 82    5-1  F 77  D 124 - 2 hours    5-2  F 104  B 252  D 152    5-3  F 82  L 75  D 133    5-4  F 80  B 290  D 143    5-5  F 97  L 105  D 89    5-6  F 90  L 118  D 235    5-7  F 75        Past Medical History       Patient Active Problem List   Diagnosis     Bilateral carpal tunnel syndrome     Gastroesophageal reflux disease with esophagitis     Learning problem     Diabetes (H)     Hyperlipidemia LDL goal <100     High-risk pregnancy, elderly multigravida, unspecified trimester     Pre-existing type 2 diabetes mellitus during pregnancy in second trimester        Past Surgical History     No past surgical history on file.    Family History     Family History   Problem Relation Age of Onset     Hyperlipidemia Mother      Diabetes Mother      Cancer Neg Hx      Breast cancer Neg Hx      Hypertension Neg Hx        Social History     Social History     Tobacco Use     Smoking status: Never Smoker     Smokeless tobacco: Former User     Types: Chew     Tobacco comment: chews betel nut NO Tobacco   Substance Use Topics     Alcohol use: No     Drug use: No        Review of Systems     Patient has no polyuria or polydipsia, no chest pain, dyspnea or TIA's, no numbness, tingling or pain in extremities  Remainder negative except as noted in HPI.      Vital Signs     LMP 2020 (Exact Date)   Wt Readings from Last 3 Encounters:   20 141 lb (64 kg)   20 138 lb (62.6 kg)   20 135 lb (61.2 kg)         Assessment     1. Pre-existing type 2 diabetes mellitus during pregnancy in second trimester        Plan     1.  DIABETES in pregnancy -  Adjust dose as follows:     -Lantus insulin 14   units. Increase by 2 units every 2 days to keep fasting blood glucose below 95mg/dL  -Novolog 16  units with breakfast  -Novolog 16 units with lunch   -Novolog 16 units with dinner  -Increase by 2 units every 2 days to keep 1 hour after meal blood glucose less than 140mg/dL     We reviewed glucose goals of fasting blood glucose <95 mg/dL and 1 hour post prandial blood glucose of <140 mg/dL.    Monitor blood sugar 4 times daily: Fasting  and 1 hour after each meal.  Contact  this clinic [phone] if blood glucose is not within the above-mentioned goals.      We discussed the importance of excellent glycemic control during pregnancy to limit complications such as fetal macrosomia, shoulder dystocia,  hypoglycemia and hyperbilirubinemia.  I have discussed the patient's increased risk of recurrent GDM and/or development of type 2 diabetes later in life.          F/u with us in 2 weeks.           Lab Results     Hemoglobin A1c   Date Value Ref Range Status   2020 7.4 (H) 3.5 - 6.0 % Final   2020 8.2 (H) 3.5 - 6.0 % Final   2019 6.8 (H) 3.5 - 6.0 % Final   2019 8.7 (H) 3.5 - 6.0 % Final   2019 8.1 (H) 3.5 - 6.0 % Final     Creatinine   Date Value Ref Range Status   2019 0.75 0.60 - 1.10 mg/dL Final   2018 0.73 0.60 - 1.10 mg/dL Final   10/08/2018 0.84 0.60 - 1.10 mg/dL Final     Microalbumin, Random Urine   Date Value Ref  "Range Status   01/07/2020 2.24 (H) 0.00 - 1.99 mg/dL Final       Cholesterol   Date Value Ref Range Status   02/07/2020 182 <=199 mg/dL Final     HDL Cholesterol   Date Value Ref Range Status   02/07/2020 43 (L) >=50 mg/dL Final     LDL Calculated   Date Value Ref Range Status   02/07/2020 67 <=129 mg/dL Final     Triglycerides   Date Value Ref Range Status   02/07/2020 361 (H) <=149 mg/dL Final       Lab Results   Component Value Date    ALT 35 12/26/2018    AST 25 12/26/2018    ALKPHOS 56 12/26/2018    BILITOT 0.8 12/26/2018         Current Medications     Outpatient Medications Prior to Visit   Medication Sig Dispense Refill     acetaminophen (TYLENOL) 325 MG tablet Take 2 tablets (650 mg total) by mouth every 6 (six) hours as needed for pain. 100 tablet 1     aspirin 81 MG EC tablet Take 1 tablet (81 mg total) by mouth daily. 90 tablet 2     blood glucose test (CONTOUR TEST STRIPS) strips Use 1 each As Directed 4 (four) times a day. Dispense brand per patient's insurance at pharmacy discretion. 100 strip 3     calcium, as carbonate, (TUMS) 200 mg calcium (500 mg) chewable tablet Chew 1 tablet (200 mg total) daily. 100 tablet 3     generic lancets (FINGERSTIX LANCETS) Use 1 each As Directed 4 (four) times a day. Dispense brand per patient's insurance 100 each 3     insulin glargine (LANTUS SOLOSTAR U-100 INSULIN) 100 unit/mL (3 mL) pen Take 12 units of insulin every night before bed.  Increase as directed.  Max daily dose 40 units. 4 adj dose pen 2     insulin lispro (HUMALOG KWIKPEN INSULIN) 100 unit/mL pen Take 10 units before each meal every day.  Increase as directed.  Max daily dose 50 units. 5 adj dose pen 2     metFORMIN (GLUCOPHAGE) 1000 MG tablet Take 1 tablet (1,000 mg total) by mouth 2 (two) times a day with meals. 180 tablet 3     omeprazole (PRILOSEC) 20 MG capsule Take 1 capsule (20 mg total) by mouth daily before breakfast. 30 capsule 11     pen needle, diabetic 32 gauge x 5/32\" Ndle Use 1 each As " Directed 3 (three) times a day. 100 each 1     prenatal vit-iron fum-folic ac (PRENATAL VITAMIN) 27 mg iron- 0.8 mg Tab tablet Take 1 tablet by mouth daily. 100 tablet 3     No facility-administered medications prior to visit.            Total time of call between patient and provider was 19 minutes    Emile ANDERSON

## 2021-06-08 NOTE — TELEPHONE ENCOUNTER
[facility] GDM Care Plan      Assessment/Plan: doing well  Not eating lunch anymore  Dinner is about 4-5pm  Breakfast 9-10am  Forgetting to check after breakfast  16 units with breakfast dinner 16   Lantus 14 units  Discussed importance of checking   Higher when eating  Increase dinner to 18  Yesterday  Rice with soup veggie and meat 1-2 times daily  Fruit for snacks  Baby moving  No swelling  Spoke with [facility] via telephone today.  She is doing well.  Stated she is not having any swelling and is feeling baby move.  She has stopped eating lunch and is eating one meal some days.  She has been forgetting to check her blood sugar after breakfast, having only 2 readings some days.  She is currently taking 16 units before meals and 14 units at bedtime.    Blood Sugar Readings:     5-14  F 82       5-15  F 70       5-16  F 95       5-17  F 133       5-18  F 78       5-19  Before bed 200     5-20  F 80  L 215  D 143     5-21  F 97  Discussed the importance of checking glucose three times a day as we use this to decide how much medication to give her.  Encouraged the to eat regularly and check BG regularly.  Increase dinner dose to 18 units, keep breakfast and bedtime dose the same.  Follow-up in 1 week.      Visit Type: pregnancy clinic   Provider: Bert  Provider's Diagnosis (per referral form): Gestational (648.83)  OGTT: No results found for this or any previous visit.   Estimated Date of Delivery: 10/14/20   Pregnancy #: 6  Previous GDM: Yes   Medications:   Current Outpatient Medications:      acetaminophen (TYLENOL) 325 MG tablet, Take 2 tablets (650 mg total) by mouth every 6 (six) hours as needed for pain., Disp: 100 tablet, Rfl: 1     aspirin 81 MG EC tablet, Take 1 tablet (81 mg total) by mouth daily., Disp: 90 tablet, Rfl: 2     blood glucose test (CONTOUR TEST STRIPS) strips, Use 1 each As Directed 4 (four) times a day. Dispense brand per patient's insurance at pharmacy discretion., Disp:  "100 strip, Rfl: 3     calcium, as carbonate, (TUMS) 200 mg calcium (500 mg) chewable tablet, Chew 1 tablet (200 mg total) daily., Disp: 100 tablet, Rfl: 3     generic lancets (FINGERSTIX LANCETS), Use 1 each As Directed 4 (four) times a day. Dispense brand per patient's insurance, Disp: 100 each, Rfl: 3     insulin glargine (LANTUS SOLOSTAR U-100 INSULIN) 100 unit/mL (3 mL) pen, Take 12 units of insulin every night before bed.  Increase as directed.  Max daily dose 40 units., Disp: 4 adj dose pen, Rfl: 2     insulin lispro (HUMALOG KWIKPEN INSULIN) 100 unit/mL pen, Take 10 units before each meal every day.  Increase as directed.  Max daily dose 50 units., Disp: 5 adj dose pen, Rfl: 2     metFORMIN (GLUCOPHAGE) 1000 MG tablet, Take 1 tablet (1,000 mg total) by mouth 2 (two) times a day with meals., Disp: 180 tablet, Rfl: 3     omeprazole (PRILOSEC) 20 MG capsule, Take 1 capsule (20 mg total) by mouth daily before breakfast., Disp: 30 capsule, Rfl: 11     pen needle, diabetic 32 gauge x 5/32\" Ndle, Use 1 each As Directed 3 (three) times a day., Disp: 100 each, Rfl: 1     prenatal vit-iron fum-folic ac (PRENATAL VITAMIN) 27 mg iron- 0.8 mg Tab tablet, Take 1 tablet by mouth daily., Disp: 100 tablet, Rfl: 3      BG monitoring goals: Fasting <95; 1 hour post start of meal <140. Test 4 x per day.  Check fasting a.m. ketones: Yes  GDM meal pattern/carb counting taught per guidelines: Yes    I agree with the aforementioned diabetes plan.  Betsy OBRIEN Counts include 234 beds at the [facility]  5/21/2020  3:27 PM    "

## 2021-06-08 NOTE — PROGRESS NOTES
"Stacey Melendez is a 40 y.o. female who is being evaluated via a billable telephone visit.      The patient has been notified of following:     \"This telephone visit will be conducted via a call between you and your physician/provider. We have found that certain health care needs can be provided without the need for a physical exam.  This service lets us provide the care you need with a short phone conversation.  If a prescription is necessary we can send it directly to your pharmacy.  If lab work is needed we can place an order for that and you can then stop by our lab to have the test done at a later time.    Telephone visits are billed at different rates depending on your insurance coverage. During this emergency period, for some insurers they may be billed the same as an in-person visit.  Please reach out to your insurance provider with any questions.    If during the course of the call the physician/provider feels a telephone visit is not appropriate, you will not be charged for this service.\"    Patient has given verbal consent to a Telephone visit? Yes    What phone number would you like to be contacted at?     Patient would like to receive their AVS by AVS Preference: Juliocesar.    Additional provider notes:      [facility]  ENDOCRINOLOGY    Gestational Diabetes 2020    Stacey Melendez, 1980, [id number]          Reason for visit      1. Pre-existing type 2 diabetes mellitus during pregnancy in second trimester        HPI     Stacey Melendez is a very pleasant 39 y.o. old female who presents for management of Diabetes Mellitus during pregnancy.  She is currently 20w1d  weeks pregnant . Due date is 10/14/2020    Current carbohydrate intake:consistent with recommendations of 30g-60g-60g.  I have reviewed her blood glucose logs and note that the:  Fasting readings  are:above range on current regimen  Postprandial readings are:above range on current regimen  Current Lantus dose: 22 AM  18 PM  Current Prandial " insulin:   Blood glucose logs/meter brought in and data reviewed and incorporated into decision-making.  Planned delivery at: Unknown  OBGYN: [facility]    Therapy/Interventions in the past:  She has been seen by the Diabetes Educator- and has received instruction on carbohydrate counting and  consistency.  Records from referring provider and other sources have also been reviewed and incorporated into decision-making.      TODAY:    [facility] is contacted today in f/u for management of DM 2 during pregnancy. We are joined by SOTERO Caraballo and a professional .  She has supplied her BG for us today and her FBS remain varied. Her dinner readings are higher, especially when she has extra rice.  We will increase her morning dose and leave her evening dose alone.  Unfortunately, she cannot remember to take prandial insulin, which would make things easier. She reports that baby is moving and she is having no swelling at present.     Blood Sugar Readings:    6-4  F 107  B 100  D 228    6-5  F 97  B 193  D 163    6-6  F 91  D 99    6-7  F 79  B 110    6-8  F 96  B 196  D 98    6-9  F 110  B 124  D 243    6-10  F 115  B 123  D 187    6-11  F 111      Past Medical History       Patient Active Problem List   Diagnosis     Bilateral carpal tunnel syndrome     Gastroesophageal reflux disease with esophagitis     Learning problem     Diabetes (H)     Hyperlipidemia LDL goal <100     High-risk pregnancy, elderly multigravida, unspecified trimester     Pre-existing type 2 diabetes mellitus during pregnancy in second trimester        Past Surgical History     No past surgical history on file.    Family History     Family History   Problem Relation Age of Onset     Hyperlipidemia Mother      Diabetes Mother      Cancer Neg Hx      Breast cancer Neg Hx      Hypertension Neg Hx        Social History     Social History     Tobacco Use     Smoking status: Never Smoker     Smokeless tobacco: Former User     Types: Chew     Tobacco  comment: chews betel nut NO Tobacco   Substance Use Topics     Alcohol use: No     Drug use: No       Review of Systems     Patient has no polyuria or polydipsia, no chest pain, dyspnea or TIA's, no numbness, tingling or pain in extremities  Remainder negative except as noted in HPI.      Vital Signs     LMP 2020 (Exact Date)   Wt Readings from Last 3 Encounters:   20 140 lb (63.5 kg)   20 141 lb (64 kg)   20 138 lb (62.6 kg)           Assessment     1. Pre-existing type 2 diabetes mellitus during pregnancy in second trimester        Plan     1.  DIABETES in pregnancy -  Adjust dose as follows:     -Lantus insulin 26 AM, 18 PM. Increase by 2 units every 2 days to keep fasting blood glucose below 95mg/dL     We reviewed glucose goals of fasting blood glucose <95 mg/dL and 1 hour post prandial blood glucose of <140 mg/dL.    Monitor blood sugar 4 times daily: Fasting  and 1 hour after each meal.  Contact  this clinic [phone] if blood glucose is not within the above-mentioned goals.      We discussed the importance of excellent glycemic control during pregnancy to limit complications such as fetal macrosomia, shoulder dystocia,  hypoglycemia and hyperbilirubinemia.  I have discussed the patient's increased risk of recurrent GDM and/or development of type 2 diabetes later in life.          F/u in 1 week.            Lab Results     Hemoglobin A1c   Date Value Ref Range Status   2020 7.4 (H) 3.5 - 6.0 % Final   2020 8.2 (H) 3.5 - 6.0 % Final   2019 6.8 (H) 3.5 - 6.0 % Final   2019 8.7 (H) 3.5 - 6.0 % Final   2019 8.1 (H) 3.5 - 6.0 % Final     Creatinine   Date Value Ref Range Status   2020 0.62 0.60 - 1.10 mg/dL Final   2019 0.75 0.60 - 1.10 mg/dL Final   2018 0.73 0.60 - 1.10 mg/dL Final     Microalbumin, Random Urine   Date Value Ref Range Status   2020 2.24 (H) 0.00 - 1.99 mg/dL Final       Cholesterol   Date Value Ref Range  "Status   02/07/2020 182 <=199 mg/dL Final     HDL Cholesterol   Date Value Ref Range Status   02/07/2020 43 (L) >=50 mg/dL Final     LDL Calculated   Date Value Ref Range Status   02/07/2020 67 <=129 mg/dL Final     Triglycerides   Date Value Ref Range Status   02/07/2020 361 (H) <=149 mg/dL Final       Lab Results   Component Value Date    ALT 10 05/22/2020    AST 15 05/22/2020    ALKPHOS 56 12/26/2018    BILITOT 0.8 12/26/2018         Current Medications     Outpatient Medications Prior to Visit   Medication Sig Dispense Refill     acetaminophen (TYLENOL) 325 MG tablet Take 2 tablets (650 mg total) by mouth every 6 (six) hours as needed for pain. 100 tablet 1     aspirin 81 MG EC tablet Take 1 tablet (81 mg total) by mouth daily. 90 tablet 2     blood glucose test (CONTOUR TEST STRIPS) strips Use 1 each As Directed 4 (four) times a day. Dispense brand per patient's insurance at pharmacy discretion. 100 strip 3     calcium, as carbonate, (TUMS) 200 mg calcium (500 mg) chewable tablet Chew 1 tablet (200 mg total) daily. 100 tablet 3     generic lancets (FINGERSTIX LANCETS) Use 1 each As Directed 4 (four) times a day. Dispense brand per patient's insurance 100 each 3     insulin glargine (LANTUS SOLOSTAR U-100 INSULIN) 100 unit/mL (3 mL) pen Take twice daily as directed, up to 60 units a day 5 adj dose pen 2     insulin lispro (HUMALOG KWIKPEN INSULIN) 100 unit/mL pen Take 10 units before each meal every day.  Increase as directed.  Max daily dose 50 units. 5 adj dose pen 2     metFORMIN (GLUCOPHAGE) 1000 MG tablet Take 1 tablet (1,000 mg total) by mouth 2 (two) times a day with meals. 180 tablet 3     omeprazole (PRILOSEC) 20 MG capsule Take 1 capsule (20 mg total) by mouth daily before breakfast. 30 capsule 11     pen needle, diabetic 32 gauge x 5/32\" Ndle Use 1 each As Directed 3 (three) times a day. 100 each 1     prenatal vit-iron fum-folic ac (PRENATAL VITAMIN) 27 mg iron- 0.8 mg Tab tablet Take 1 tablet by " mouth daily. 100 tablet 3     No facility-administered medications prior to visit.            Phone call duration: 19 minutes    Emile ANDERSON

## 2021-06-08 NOTE — TELEPHONE ENCOUNTER
Baby moving-yes, very much  Swelling-no  Lantus 22/18  Last night-rice about the same  Tuesday night-rice more than normal  Morning insulin to 26  [facility] GDM Care Plan      Assessment/Plan: Spoke with [facility] via telephone and professional  today.  She is currently taking Lantus 22 units in the morning and 18 units at night.  Blood Sugar Readings:     6-4  F 107  B 100  D 228     6-5  F 97  B 193  D 163     6-6  F 91  D 99     6-7  F 79  B 110     6-8  F 96  B 196  D 98     6-9  F 110  B 124  D 243     6-10  F 115  B 123  D 187     6-11  F 111  She was supposed to be taking insulin with meals, we established a few weeks ago she does not have this at home.  She is eating rice with her 2nd meal and stated sometimes she is eating more than her usual amount causing her readings to be higher after.  Will increase her morning dose to 26 units.  May need to add prandial insulin if dinner readings do not improve.  Will follow-up next week.    Visit Type: pregnancy clinic   Provider: Bert   Provider's Diagnosis (per referral form): Gestational (648.83)    Weight:    OGTT: No results found for this or any previous visit.   Estimated Date of Delivery: 10/14/20   Pregnancy #: 6  Previous GDM: Yes   Medications:   Current Outpatient Medications:      acetaminophen (TYLENOL) 325 MG tablet, Take 2 tablets (650 mg total) by mouth every 6 (six) hours as needed for pain., Disp: 100 tablet, Rfl: 1     aspirin 81 MG EC tablet, Take 1 tablet (81 mg total) by mouth daily., Disp: 90 tablet, Rfl: 2     blood glucose test (CONTOUR TEST STRIPS) strips, Use 1 each As Directed 4 (four) times a day. Dispense brand per patient's insurance at pharmacy discretion., Disp: 100 strip, Rfl: 3     calcium, as carbonate, (TUMS) 200 mg calcium (500 mg) chewable tablet, Chew 1 tablet (200 mg total) daily., Disp: 100 tablet, Rfl: 3     generic lancets (FINGERSTIX LANCETS), Use 1 each As Directed 4 (four) times a day. Dispense brand per patient's  "insurance, Disp: 100 each, Rfl: 3     insulin glargine (LANTUS SOLOSTAR U-100 INSULIN) 100 unit/mL (3 mL) pen, Take twice daily as directed, up to 60 units a day, Disp: 5 adj dose pen, Rfl: 2     insulin lispro (HUMALOG KWIKPEN INSULIN) 100 unit/mL pen, Take 10 units before each meal every day.  Increase as directed.  Max daily dose 50 units., Disp: 5 adj dose pen, Rfl: 2     metFORMIN (GLUCOPHAGE) 1000 MG tablet, Take 1 tablet (1,000 mg total) by mouth 2 (two) times a day with meals., Disp: 180 tablet, Rfl: 3     omeprazole (PRILOSEC) 20 MG capsule, Take 1 capsule (20 mg total) by mouth daily before breakfast., Disp: 30 capsule, Rfl: 11     pen needle, diabetic 32 gauge x 5/32\" Ndle, Use 1 each As Directed 3 (three) times a day., Disp: 100 each, Rfl: 1     prenatal vit-iron fum-folic ac (PRENATAL VITAMIN) 27 mg iron- 0.8 mg Tab tablet, Take 1 tablet by mouth daily., Disp: 100 tablet, Rfl: 3      BG monitoring goals: Fasting <95; 1 hour post start of meal <140. Test 4 x per day.  Check fasting a.m. ketones: No  GDM meal pattern/carb counting taught per guidelines: Yes    Past Goals:  Nutrition: GDM meal plan -Met  Activity: Walking after meals when able/staying active -Met  Monitoring: BG 4x/day as directed, ketones every morning -Met      New Goals:  Nutrition: GDM meal plan   Activity: Walking after meals when able/staying active   Monitoring: BG 4x/day as directed, ketones every morning    DIABETES CARE PLAN AND EDUCATION RECORD    Gestational Diabetes Disease Process/Preconception Care/Management During Pregnancy/Postpartum:Discussed    Meter (per above goals): Assessed and Discussed    Nutrition Management    Weight: Assessed and Discussed  Portions/Balance: Assessed and Discussed  Carb ID/Count: Assessed and Discussed  Label Reading: Assessed and Discussed  Menu Planning: Assessed and Discussed  Dining Out: Assessed and Discussed  Physical Activity: Assessed and Discussed    Acute Complications: Prevent, " Detect, Treat:    Goal Setting and Problem Solving: Assessed and Discussed  Barriers: Assessed and Discussed  Psychosocial Adjustments: Assessed and Discussed    I agree with the aforementioned diabetes plan.  Betsy OBRIEN [facility]  6/12/2020  2:23 PM

## 2021-06-08 NOTE — PROGRESS NOTES
"SUBJECTIVE:  Stacey Melendez is a 39 y.o. female  at 19w2d by exact LMP c/w 1st tri US. Estimated Date of Delivery: 10/14/20.  She is doing well. She denies nausea and vomiting. She denies abdominal pain/cramping, vaginal bleeding, leakage of fluid. Fetal movement is now present.   Concerns: has [facility] appointment and level 2 US next week - she needs address to this location.    DM type 2:   Follows with endocrinology/diabetes education and has been having virtual weekly visit  She reports taking insulin twice per day- 16 units in AM, 24 units PM? She reports this is the same type of insuln. She did not bring her meter or insulin in with her today so unable to review numbers.  She has been taking daily ASA.    ROS  Negative except as noted above in HPI.  OBJECTIVE:  Blood pressure 98/62, pulse 84, temperature 97.9  F (36.6  C), temperature source Oral, resp. rate 16, height 4' 10.25\" (1.48 m), weight 140 lb (63.5 kg), last menstrual period 2020, SpO2 98 %, not currently breastfeeding.  Gen: comfortable, no acute distress.  See OB Vitals flowsheet.  ASSESSMENT/PLAN:  Stacey was seen today for routine prenatal visit.    Diagnoses and all orders for this visit:    Pre-existing type 2 diabetes mellitus during pregnancy in second trimester: High risk pregnancy- has upcoming appointment as well as level 2 US with [facility] next week- I gave her the address to this clinic today and reviewed importance of appointment. I did obtain baseline HELLP labs today as well as reinforced the importance of daily ASA and the reasoning behind it. She is co-managed with diabetes education/endocrinology- reviewed notes- patient did not bring her medications or glucometer in today so I was unable to review her #'s or clarify her dosing- I have some concern because she says she is only taking insulin twice per day- I will contact diabetes education who is doing weekly virtual visits with patient to review her #'s. Will need  " surveillance and growth scans through [facility].  -     AST (SGOT)  -     ALT (SGPT)  -     INR  -     APTT(PTT)  -     HM2(CBC w/o Differential)  -     Creatinine  -     Protein/Creatinine Ratio, Urine Random  -     Uric Acid    High-risk pregnancy, elderly multigravida, unspecified trimester: Progenity normal. It's a boy.      -IUP at 19w2d:   Prenatal labs reviewed  Level 2 US scheduled next week with MFM    Post-partum Contraception: depo or pill   Breast/Bottle: breast and formula  RTC in 4 weeks or sooner with problems. Continue with weekly visits with diabetes education to review her blood glucose levels and has appointment with MFM next week.    Visit completed along with assistance of Wendy  (phone  due to Covid-19 pandemic).    Dianne Vasquez MD

## 2021-06-08 NOTE — PROGRESS NOTES
"SUBJECTIVE:  Stacey Melendez is a 40 y.o. female  at 22w2d. Estimated Date of Delivery: 10/14/20.  She is doing well. She denies nausea and vomiting. She denies abdominal pain/cramping, vaginal bleeding, leakage of fluid. Fetal movement is present.   Concerns: None. She has follow-up appointment with MFM as well as for fetal echocardiogram due to pre-existing diabetes. She has been following with endocrinology and diabetes education. She did not bring her glucometer or insulin with her today. She did bring in her bottle of metformin and asks if she is supposed to continue taking this- she has been taking one per day. 1,000 mg daily- sometimes she takes this if her sugar is high and does not if her sugar is low. She has been working to eat smaller portions of carbohydrates.  Insulin glargine- 26 units AM, 18 units PM  Insulin lispro- she reports she only has one insulin pen- has not been taking mealtime insulin and does not have this at home.  ROS  Negative except as noted above in HPI.  OBJECTIVE:  Blood pressure 98/62, pulse 95, temperature 97.9  F (36.6  C), temperature source Oral, resp. rate 16, height 4' 10.25\" (1.48 m), weight 142 lb 12 oz (64.8 kg), last menstrual period 2020, SpO2 98 %, not currently breastfeeding.  Gen: comfortable, no acute distress.  See OB Vitals flowsheet.  ASSESSMENT/PLAN:  Stacey was seen today for routine prenatal visit.    Diagnoses and all orders for this visit:    High-risk pregnancy, elderly multigravida, unspecified trimester    Pre-existing type 2 diabetes mellitus during pregnancy in second trimester: co-managed with MFM as well as endocrinology and diabetes education. Has follow-up for growth US with MFM as well as fetal echocardiogram due to pre-gestational DM. She does not bring in glucometer- she seems to have some confusion about her insulin management. I encouraged her to bring in her glucometer as well as all medications to her next appointment. She appears to " only have insulin glargine at home- she is taking this two times a day. She does not reportedly have her short-acting insulin for mealtime at home- I had clinical assistant call pharmacy to hopefully have this delivered- this was ordered by endocrinology. I will touch base with her endocrinology provider to clarify what medications she should be taking. She may need in-person management due to language barrier and complex medication management.      -IUP at 22w2d:   Prenatal labs reviewed    AMA- progenity normal  Fetal survey was done.   Post-partum Contraception: depo or pill   Breast/Bottle:  Breast and formula  RTC in 4 weeks or sooner with problems.    Visit completed along with assistance of Wendy .    Dianne Vasquez MD

## 2021-06-08 NOTE — PROGRESS NOTES
"Stacey Melendez is a 39 y.o. female who is being evaluated via a billable telephone visit.      The patient has been notified of following:     \"This telephone visit will be conducted via a call between you and your physician/provider. We have found that certain health care needs can be provided without the need for a physical exam.  This service lets us provide the care you need with a short phone conversation.  If a prescription is necessary we can send it directly to your pharmacy.  If lab work is needed we can place an order for that and you can then stop by our lab to have the test done at a later time.    Telephone visits are billed at different rates depending on your insurance coverage. During this emergency period, for some insurers they may be billed the same as an in-person visit.  Please reach out to your insurance provider with any questions.    If during the course of the call the physician/provider feels a telephone visit is not appropriate, you will not be charged for this service.\"    Patient has given verbal consent to a Telephone visit? Yes    What phone number would you like to be contacted at?     Patient would like to receive their AVS by AVS Preference: Juliocesar.    Additional provider notes:          Reason for visit      1. Pre-existing type 2 diabetes mellitus during pregnancy in second trimester        HPI     Stacey Melendez is a very pleasant 39 y.o. old female who presents for management of Diabetes Mellitus during pregnancy.  She is currently 17w1d  weeks pregnant . Due date is 10/14/2020    Current carbohydrate intake:consistent with recommendations of 30g-60g-60g.  I have reviewed her blood glucose logs and note that the:  Fasting readings  are:above range on current regimen  Postprandial readings are:above range on current regimen  Current Lantus dose: 14  Current Prandial insulin:   Blood glucose logs/meter brought in and data reviewed and incorporated into " decision-making.  Planned delivery at: Unknown  OBGYN: Christina    Therapy/Interventions in the past:  She has been seen by the Diabetes Educator- and has received instruction on carbohydrate counting and  consistency.  Records from referring provider and other sources have also been reviewed and incorporated into decision-making.      TODAY:  [facility] is contacted today in f/u for management of DM 2 in pregnancy. We are joined by SOTERO Caraballo and a professional .  Notably, pt is not testing after breakfast. She is also occasionally missing her Breakfast dose. She has stopped eating lunch. She is eating dinner between 4 and 5 and doesn't always eat a snack at HS. Dinner numbers are elevated, so we will increase the dose.  Instructed to be consistent with this if possible. Baby is moving appropriately and she is having no swelling.          Blood Sugar Readings:    5-14  F 82      5-15  F 70      5-16  F 95      5-17  F 133      5-18  F 78      5-19  Before bed 200    5-20  F 80  L 215  D 143    5-21  F 97        Past Medical History:    Patient Active Problem List   Diagnosis     Bilateral carpal tunnel syndrome     Gastroesophageal reflux disease with esophagitis     Learning problem     Diabetes (H)     Hyperlipidemia LDL goal <100     High-risk pregnancy, elderly multigravida, unspecified trimester     Pre-existing type 2 diabetes mellitus during pregnancy in second trimester        Past Surgical History     No past surgical history on file.    Family History     Family History   Problem Relation Age of Onset     Hyperlipidemia Mother      Diabetes Mother      Cancer Neg Hx      Breast cancer Neg Hx      Hypertension Neg Hx        Social History     Social History     Tobacco Use     Smoking status: Never Smoker     Smokeless tobacco: Former User     Types: Chew     Tobacco comment: chews betel nut NO Tobacco   Substance Use Topics     Alcohol use: No     Drug use: No        Review of Systems     Patient has no polyuria or polydipsia, no chest pain, dyspnea or TIA's, no numbness, tingling or pain in extremities  Remainder negative except as noted in HPI.      Vital Signs     LMP 2020 (Exact Date)   Wt Readings from Last 3 Encounters:   20 140 lb (63.5 kg)   20 141 lb (64 kg)   20 138 lb (62.6 kg)           Assessment     1. Pre-existing type 2 diabetes mellitus during pregnancy in second trimester        Plan     1.  DIABETES in pregnancy -  Adjust dose as follows:     -Lantus insulin 14   units. Increase by 2 units every 2 days to keep fasting blood glucose below 95mg/dL  -Novolog 16  units with breakfast  -Novolog 18 units with dinner  -Increase by 2 units every 2 days to keep 1 hour after meal blood glucose less than 140mg/dL     We reviewed glucose goals of fasting blood glucose <95 mg/dL and 1 hour post prandial blood glucose of <140 mg/dL.    Monitor blood sugar 4 times daily: Fasting  and 1 hour after each meal.  Contact  this clinic [phone] if blood glucose is not within the above-mentioned goals.      We discussed the importance of excellent glycemic control during pregnancy to limit complications such as fetal macrosomia, shoulder dystocia,  hypoglycemia and hyperbilirubinemia.  I have discussed the patient's increased risk of recurrent GDM and/or development of type 2 diabetes later in life.          F/u with us in 2 weeks.       Lab Results     Hemoglobin A1c   Date Value Ref Range Status   2020 7.4 (H) 3.5 - 6.0 % Final   2020 8.2 (H) 3.5 - 6.0 % Final   2019 6.8 (H) 3.5 - 6.0 % Final   2019 8.7 (H) 3.5 - 6.0 % Final   2019 8.1 (H) 3.5 - 6.0 % Final     Creatinine   Date Value Ref Range Status   2020 0.62 0.60 - 1.10 mg/dL Final   2019 0.75 0.60 - 1.10 mg/dL Final   2018 0.73 0.60 - 1.10 mg/dL Final     Microalbumin, Random Urine   Date Value Ref Range Status   2020 2.24 (H)  "0.00 - 1.99 mg/dL Final       Cholesterol   Date Value Ref Range Status   02/07/2020 182 <=199 mg/dL Final     HDL Cholesterol   Date Value Ref Range Status   02/07/2020 43 (L) >=50 mg/dL Final     LDL Calculated   Date Value Ref Range Status   02/07/2020 67 <=129 mg/dL Final     Triglycerides   Date Value Ref Range Status   02/07/2020 361 (H) <=149 mg/dL Final       Lab Results   Component Value Date    ALT 10 05/22/2020    AST 15 05/22/2020    ALKPHOS 56 12/26/2018    BILITOT 0.8 12/26/2018         Current Medications     Outpatient Medications Prior to Visit   Medication Sig Dispense Refill     acetaminophen (TYLENOL) 325 MG tablet Take 2 tablets (650 mg total) by mouth every 6 (six) hours as needed for pain. 100 tablet 1     aspirin 81 MG EC tablet Take 1 tablet (81 mg total) by mouth daily. 90 tablet 2     blood glucose test (CONTOUR TEST STRIPS) strips Use 1 each As Directed 4 (four) times a day. Dispense brand per patient's insurance at pharmacy discretion. 100 strip 3     calcium, as carbonate, (TUMS) 200 mg calcium (500 mg) chewable tablet Chew 1 tablet (200 mg total) daily. 100 tablet 3     generic lancets (FINGERSTIX LANCETS) Use 1 each As Directed 4 (four) times a day. Dispense brand per patient's insurance 100 each 3     insulin glargine (LANTUS SOLOSTAR U-100 INSULIN) 100 unit/mL (3 mL) pen Take 12 units of insulin every night before bed.  Increase as directed.  Max daily dose 40 units. 4 adj dose pen 2     insulin lispro (HUMALOG KWIKPEN INSULIN) 100 unit/mL pen Take 10 units before each meal every day.  Increase as directed.  Max daily dose 50 units. 5 adj dose pen 2     metFORMIN (GLUCOPHAGE) 1000 MG tablet Take 1 tablet (1,000 mg total) by mouth 2 (two) times a day with meals. 180 tablet 3     omeprazole (PRILOSEC) 20 MG capsule Take 1 capsule (20 mg total) by mouth daily before breakfast. 30 capsule 11     pen needle, diabetic 32 gauge x 5/32\" Ndle Use 1 each As Directed 3 (three) times a day. 100 " each 1     prenatal vit-iron fum-folic ac (PRENATAL VITAMIN) 27 mg iron- 0.8 mg Tab tablet Take 1 tablet by mouth daily. 100 tablet 3     No facility-administered medications prior to visit.          Total time of call between patient and provider was 20 minutes      Emile ANDERSON

## 2021-06-09 NOTE — PROGRESS NOTES
[facility] GDM Care Plan      Assessment/Plan: Spoke with [facility] via professional  via telephone.  She is currently taking Lantus 24 units at bedtime and 18 when she wakes.  We talked about starting meal time insulin last week, she did not start this.  Blood Sugar Readings:     6-18  F 105  L 196     6-19  F 92  L 77     6-20  F 78  L 99     6-21  F 80  L 61  Before bed 164      6-22  F 78  L 88     6-23  F 80  L 107  Before bed 118     6-24  F 103  L 91  Before bed 134     6-25  F 104  L 201  Stated she is not taking her blood sugar after breakfast because she knows it will be high.  She is not snacking before bed and has had some lower readings.  Stated she feels sweaty and shaky when her blood sugar is low.  Due to the lack of readings and that she is not taking the meal time insulin yet no changes to doses today.  Will follow-up again next week.    Visit Type: pregnancy clinic   Provider: Bert  Provider's Diagnosis (per referral form):     Weight:    Lab Results   Component Value Date    HGBA1C 7.4 (H) 03/25/2020       Estimated Date of Delivery: 10/14/20   Pregnancy #: 6  Previous GDM: Yes   Medications:   Current Outpatient Medications:      acetaminophen (TYLENOL) 325 MG tablet, Take 2 tablets (650 mg total) by mouth every 6 (six) hours as needed for pain., Disp: 100 tablet, Rfl: 1     aspirin 81 MG EC tablet, Take 1 tablet (81 mg total) by mouth daily., Disp: 90 tablet, Rfl: 2     blood glucose test (CONTOUR TEST STRIPS) strips, Use 1 each As Directed 4 (four) times a day. Dispense brand per patient's insurance at pharmacy discretion., Disp: 100 strip, Rfl: 3     calcium, as carbonate, (TUMS) 200 mg calcium (500 mg) chewable tablet, Chew 1 tablet (200 mg total) daily., Disp: 100 tablet, Rfl: 3     generic lancets (FINGERSTIX LANCETS), Use 1 each As Directed 4 (four) times a day. Dispense brand per patient's insurance, Disp: 100 each, Rfl: 3     insulin aspart U-100 (NOVOLOG FLEXPEN U-100 INSULIN) 100  "unit/mL (3 mL) injection pen, Take 4 units right before eating lunch and dinner.  Increase as directed, max dose of 50 units daily., Disp: 15 mL, Rfl: 3     insulin glargine (LANTUS SOLOSTAR U-100 INSULIN) 100 unit/mL (3 mL) pen, Take twice daily as directed, up to 60 units a day, Disp: 5 adj dose pen, Rfl: 2     insulin lispro (HUMALOG KWIKPEN INSULIN) 100 unit/mL pen, Take 4 units right before eating lunch and dinner.  Increase as directed, max dose of 50 units daily., Disp: 15 mL, Rfl: 0     metFORMIN (GLUCOPHAGE) 1000 MG tablet, TAKE 1 TABLET (1,000 MG TOTAL) BY MOUTH 2 (TWO) TIMES A DAY WITH MEALS FOR DIABETES, Disp: 180 tablet, Rfl: 3     omeprazole (PRILOSEC) 20 MG capsule, Take 1 capsule (20 mg total) by mouth daily before breakfast., Disp: 30 capsule, Rfl: 11     pen needle, diabetic 32 gauge x 5/32\" Ndle, Use 1 each As Directed 3 (three) times a day., Disp: 100 each, Rfl: 1     prenatal vit-iron fum-folic ac (PRENATAL VITAMIN) 27 mg iron- 0.8 mg Tab tablet, Take 1 tablet by mouth daily., Disp: 100 tablet, Rfl: 3      BG monitoring goals: Fasting <95; 1 hour post start of meal <140. Test 4 x per day.  Check fasting a.m. ketones: No  GDM meal pattern/carb counting taught per guidelines: Yes    Past Goals:  Nutrition: GDM meal plan -Met  Activity: Walking after meals when able/staying active -Met  Monitoring: BG 4x/day as directed, ketones every morning -Met      New Goals:  Nutrition: GDM meal plan   Activity: Walking after meals when able/staying active   Monitoring: BG 4x/day as directed, ketones every morning    DIABETES CARE PLAN AND EDUCATION RECORD    Gestational Diabetes Disease Process/Preconception Care/Management During Pregnancy/Postpartum:Discussed    Meter (per above goals): Assessed and Discussed    Nutrition Management    Weight: Assessed and Discussed  Portions/Balance: Assessed and Discussed  Carb ID/Count: Assessed and Discussed  Label Reading: Assessed and Discussed  Menu Planning: " Assessed and Discussed  Dining Out: Assessed and Discussed  Physical Activity: Assessed and Discussed    Acute Complications: Prevent, Detect, Treat:    Goal Setting and Problem Solving: Assessed and Discussed  Barriers: Assessed and Discussed  Psychosocial Adjustments: Assessed and Discussed

## 2021-06-09 NOTE — TELEPHONE ENCOUNTER
Not covered by pt's insurance. Need PA or alternative prescription.       Disp  Refills  Start  End  STEFAN    insulin lispro (HUMALOG KWIKPEN INSULIN) 100 unit/mL pen  15 mL  0  6/24/2020   --    Sig: Take 4 units right before eating lunch and dinner.  Increase as directed, max dose of 50 units daily.    Sent to pharmacy as: insulin lispro (U-100) 100 unit/mL subcutaneous pen (HumaLOG KwikPen Insulin)

## 2021-06-09 NOTE — PROGRESS NOTES
Diabetes Educator has received verbal consent for a telephone visit for the patient.  Unable to conduct video visit    Assessment:   Phone consult for pregnancy clinic visit.   Stacey is 27 weeks gestation and has pre existing type 2 diabetes.   Spoke with Stacey via telephone through a professional  for her Diabetes education follow-up today.             Current Lantus dose: 24 Lantus  Current Prandial insulin: 6/6/6             F 90, 101, 81, 90, 93, 120, 107        L 149, 176, 177,  204, 210, 188        D 197, 150, 159, 73, 98     She is eating 2 meals daily, her first meal is of rice, soup, meat and vegetables.  She reports first meal at 9-10 am and second meal as late as 10 pm.  Will have soup or snacks during the day -  she states she checks BG after this mid day meal/snack but difficult assessing this as the BG don't always line up & via phone communication.  Stated she is eating about a handful of rice at each meal.  She reports eating fruit for snacks.  She does not check urine ketones.  She walks in the park in the afternoons on daily basis when able.  She does the cooking and shopping.    Plan:  Encouraged her to check BG if eating a  3 rd meal, which is recommended.    Reviewed signs/treatment for hypoglycemia.   Rachel Joy increased lunchtime insulin to 8 unit Humalog.   Encouraged her to take meal time insulin 10-15 mintues before starting to eat.  Encouraged 3 meals/ 3 snacks.  Reviewed carb recommendations.  Due for Alc.       Provider: Christina  Provider's Diagnosis (per referral form) DMT2 during pregnancy     Weight:          Lab Results   Component Value Date     HGBA1C 7.4 (H) 03/25/2020     EDC: Estimated Date of Delivery: 10/14/20  Pregnancy number: 6  Previous GDM: No     Medications:   Current Outpatient Medications:      aspirin 81 MG EC tablet, Take 1 tablet (81 mg total) by mouth daily., Disp: 90 tablet, Rfl: 2     blood glucose test (CONTOUR TEST STRIPS) strips, Use 1 each As  "Directed 4 (four) times a day. Dispense brand per patient's insurance at pharmacy discretion., Disp: 100 strip, Rfl: 3     generic lancets (FINGERSTIX LANCETS), Use 1 each As Directed 4 (four) times a day. Dispense brand per patient's insurance, Disp: 100 each, Rfl: 3     glipiZIDE (GLUCOTROL XL) 10 MG 24 hr tablet, Take 1 tablet (10 mg total) by mouth 2 (two) times a day with meals., Disp: 60 tablet, Rfl: 6     metFORMIN (GLUCOPHAGE) 1000 MG tablet, Take 1 tablet (1,000 mg total) by mouth 2 (two) times a day with meals., Disp: 180 tablet, Rfl: 3     prenatal vit-iron fum-folic ac (PRENATAL VITAMIN) 27 mg iron- 0.8 mg Tab tablet, Take 1 tablet by mouth daily., Disp: 100 tablet, Rfl: 3     acetaminophen (TYLENOL) 325 MG tablet, Take 2 tablets (650 mg total) by mouth every 6 (six) hours as needed for pain., Disp: 100 tablet, Rfl: 1     insulin glargine (LANTUS SOLOSTAR U-100 INSULIN) 100 unit/mL (3 mL) pen, Take 12 units of insulin every night before bed.  Increase as directed.  Max daily dose 40 units., Disp: 4 adj dose pen, Rfl: 2     insulin lispro (HUMALOG KWIKPEN INSULIN) 100 unit/mL pen, Take 10 units before each meal every day.  Increase as directed.  Max daily dose 50 units., Disp: 5 adj dose pen, Rfl: 2     omeprazole (PRILOSEC) 20 MG capsule, Take 1 capsule (20 mg total) by mouth daily before breakfast., Disp: 30 capsule, Rfl: 11     pen needle, diabetic 32 gauge x 5/32\" Ndle, Use 1 each As Directed 3 (three) times a day., Disp: 100 each, Rfl: 1  PNV: Yes  Supplements: No     BG monitoring goals: Fasting <95; 1 hour post start of meal <140. Test 4 x per day.  Check fasting a.m. ketones: No  GDM meal pattern/carb counting taught per guidelines: Yes   DIABETES CARE PLAN AND EDUCATION RECORD     Gestational Diabetes Disease Process/Preconception Care/Management During Pregnancy/Postpartum:Assessed and Discussed     Meter (per above goals): Assessed and Discussed    Medication:  assessed & " discussed  Hypoglycemia:  assessed & discussed     Nutrition Management     Weight: Assessed and Discussed  Portions/Balance: Assessed and discussed  Carb ID/Count: Assessed and discussed  Label Reading: Assessed and discussed  Menu Planning: Assessed and Discussed  Physical Activity: Assessed and Discussed  Medications: Assessed and Discussed     Acute Complications: Prevent, Detect, Treat:        Hyperglycemia: Assessed and Discussed  Goal Setting and Problem Solving: Assessed and Discussed  Barriers: Assessed and Discussed  Psychosocial Adjustments: Assessed and Discussed    15 minutes  MNT       I agree with the aforementioned diabetes plan.  Betsy OBRIEN [facility]  7/17/2020  6:07 AM

## 2021-06-09 NOTE — PROGRESS NOTES
"SUBJECTIVE:  Stacey Melendez is a 40 y.o. female  at 26w2d by exact LMP c/w 1st trimester US. Estimated Date of Delivery: 10/14/20.  She is doing well. She denies nausea and vomiting. She denies abdominal pain/cramping, vaginal bleeding, leakage of fluid. Fetal movement is present.   Concerns: Had visit with M today. She reports that she has had some numbness in her shoulders radiating down to her fingers with some pain. No increased activity, lifting or injury. Has never had this before. Denies any leg or foot numbness/tingling. She is following with diabetes education/endocrinology for glycemic management. Fastin, 114, 90, 104. Postprandials 78, 95, 107, 95, 197. She does notice that when she eats larger portions or more rice, the numbers increase. She does try to walk regularly. She reports that she has follow-up appointment with MFM in 1 month.  ROS  Negative except as noted above in HPI.  OBJECTIVE:  Blood pressure 108/66, pulse 80, temperature 98.5  F (36.9  C), temperature source Oral, height 4' 10.25\" (1.48 m), weight 144 lb (65.3 kg), last menstrual period 2020, SpO2 94 %, not currently breastfeeding.  Gen: comfortable, no acute distress.  See OB Vitals flowsheet.  ASSESSMENT/PLAN:  Stacey was seen today for routine prenatal visit.    Diagnoses and all orders for this visit:    Pre-existing type 2 diabetes mellitus during pregnancy in second trimester: Had visit with [facility] today- co-managing with [facility] and diabetes education/endocrinology. I did review her log book today- still with some elevated fastings and postprandials- she is currently taking 22 units lantus AM, 26 units PM and novolog 6 units with meals. Gave another log book today. Encouraged regular walking. UOB screen at each visit starting today. Continue daily ASA. Growth US through [facility]- will need to start  surveillance. Reviewed importance of fetal kick counts- fetal movement and warning signs.    High-risk pregnancy, elderly " multigravida, unspecified trimester  -     Hemoglobin  -     Syphilis Screen, [facility]  -     Urinalysis, OB Screen      -IUP at 26w2d:   Prenatal labs reviewed.  AMA- NIPT normal   GTT not indicated due to history of pre-gestational diabetes.   Post-partum Contraception: depo or pill   Breast/Bottle: breast and formula   RTC in 2 weeks or sooner with problems. UOB, Tdap at next visit.     Visit completed along with assistance of Wendy .    Dianne Vasquez MD

## 2021-06-09 NOTE — PROGRESS NOTES
"Stacey Melendez is a 40 y.o. female who is being evaluated via a billable telephone visit.      The patient has been notified of following:     \"This telephone visit will be conducted via a call between you and your physician/provider. We have found that certain health care needs can be provided without the need for a physical exam.  This service lets us provide the care you need with a short phone conversation.  If a prescription is necessary we can send it directly to your pharmacy.  If lab work is needed we can place an order for that and you can then stop by our lab to have the test done at a later time.    Telephone visits are billed at different rates depending on your insurance coverage. During this emergency period, for some insurers they may be billed the same as an in-person visit.  Please reach out to your insurance provider with any questions.    If during the course of the call the physician/provider feels a telephone visit is not appropriate, you will not be charged for this service.\"    Patient has given verbal consent to a Telephone visit? Yes    What phone number would you like to be contacted at?     Patient would like to receive their AVS by AVS Preference: Juliocesar.    Additional provider notes:       Reason for visit      1. Pre-existing type 2 diabetes mellitus during pregnancy in second trimester        HPI     Stacey Melendez is a very pleasant 39 y.o. old female who presents for management of Diabetes Mellitus during pregnancy.  She is currently 25w1d  weeks pregnant . Due date is 10/14/2020    Current carbohydrate intake:consistent with recommendations of 30g-60g-60g.  I have reviewed her blood glucose logs and note that the:  Fasting readings  are:above range on current regimen  Postprandial readings are:above range on current regimen  Current Lantus dose: 18 AM  22 PM  Current Prandial insulin: 0  Blood glucose logs/meter brought in and data reviewed and incorporated into " decision-making.  Planned delivery at: Unknown  OBGYN: [facility]    Therapy/Interventions in the past:  She has been seen by the Diabetes Educator- and has received instruction on carbohydrate counting and  consistency.  Records from referring provider and other sources have also been reviewed and incorporated into decision-making.      TODAY:  [facility] is contacted today in f/u for management of DM in pregnancy.  We are joined by SOTERO Hanna and a Professional . Pt has provided BG and she continues to have both high numbers and missing data. She has just obtained the Prandial insulin. We will increase her Basal insulin, and start her with 6 units with her meals.  She reports that she has not seen her OB since our last appointment.  Baby is moving appropriately and she is having no swelling.      Blood sugar readings    6/25  F 104  L 201    6/26  F  101  B  83  D  136    6/27  F 117  L  88    6/28  F 108  L  85  Before bed  160    6/29  F  84  L 201  Before bed  181    6/30  F 92  L 176  Before bed 143    7/1  F 107  L 157  Before bed 132    7/2  F 107  L 159    Past Medical History       Patient Active Problem List   Diagnosis     Bilateral carpal tunnel syndrome     Gastroesophageal reflux disease with esophagitis     Learning problem     Diabetes (H)     Hyperlipidemia LDL goal <100     High-risk pregnancy, elderly multigravida, unspecified trimester     Pre-existing type 2 diabetes mellitus during pregnancy in second trimester        Past Surgical History     No past surgical history on file.    Family History     Family History   Problem Relation Age of Onset     Hyperlipidemia Mother      Diabetes Mother      Cancer Neg Hx      Breast cancer Neg Hx      Hypertension Neg Hx        Social History     Social History     Tobacco Use     Smoking status: Never Smoker     Smokeless tobacco: Former User     Types: Chew     Tobacco comment: chews betel nut NO Tobacco   Substance Use Topics     Alcohol use: No      Drug use: No       Review of Systems     Patient has no polyuria or polydipsia, no chest pain, dyspnea or TIA's, no numbness, tingling or pain in extremities  Remainder negative except as noted in HPI.      Vital Signs     LMP 2020 (Exact Date)   Wt Readings from Last 3 Encounters:   20 142 lb 12 oz (64.8 kg)   20 140 lb (63.5 kg)   20 141 lb (64 kg)           Assessment     1. Pre-existing type 2 diabetes mellitus during pregnancy in second trimester        Plan       1.  DIABETES in pregnancy -  Adjust dose as follows:     -Lantus insulin 22 AM, 26 PM. Increase by 2 units every 2 days to keep fasting blood glucose below 95mg/dL  -Novolog 6  units with breakfast  -Novolog 6 units with lunch   -Novolog 6 units with dinner  -Increase by 2 units every 2 days to keep 1 hour after meal blood glucose less than 140mg/dL    We reviewed glucose goals of fasting blood glucose <95 mg/dL and 1 hour post prandial blood glucose of <140 mg/dL.    Monitor blood sugar 4 times daily: Fasting  and 1 hour after each meal.  Contact  this clinic [phone] if blood glucose is not within the above-mentioned goals.     We discussed the importance of excellent glycemic control during pregnancy to limit complications such as fetal macrosomia, shoulder dystocia,  hypoglycemia and hyperbilirubinemia.  I have discussed the patient's increased risk of recurrent GDM and/or development of type 2 diabetes later in life.          F/u in 1 week.             Lab Results     Hemoglobin A1c   Date Value Ref Range Status   2020 7.4 (H) 3.5 - 6.0 % Final   2020 8.2 (H) 3.5 - 6.0 % Final   2019 6.8 (H) 3.5 - 6.0 % Final   2019 8.7 (H) 3.5 - 6.0 % Final   2019 8.1 (H) 3.5 - 6.0 % Final     Creatinine   Date Value Ref Range Status   2020 0.62 0.60 - 1.10 mg/dL Final   2019 0.75 0.60 - 1.10 mg/dL Final   2018 0.73 0.60 - 1.10 mg/dL Final     Microalbumin, Random Urine    Date Value Ref Range Status   01/07/2020 2.24 (H) 0.00 - 1.99 mg/dL Final       Cholesterol   Date Value Ref Range Status   02/07/2020 182 <=199 mg/dL Final     HDL Cholesterol   Date Value Ref Range Status   02/07/2020 43 (L) >=50 mg/dL Final     LDL Calculated   Date Value Ref Range Status   02/07/2020 67 <=129 mg/dL Final     Triglycerides   Date Value Ref Range Status   02/07/2020 361 (H) <=149 mg/dL Final       Lab Results   Component Value Date    ALT 10 05/22/2020    AST 15 05/22/2020    ALKPHOS 56 12/26/2018    BILITOT 0.8 12/26/2018         Current Medications     Outpatient Medications Prior to Visit   Medication Sig Dispense Refill     acetaminophen (TYLENOL) 325 MG tablet Take 2 tablets (650 mg total) by mouth every 6 (six) hours as needed for pain. 100 tablet 1     aspirin 81 MG EC tablet Take 1 tablet (81 mg total) by mouth daily. 90 tablet 2     blood glucose test (CONTOUR TEST STRIPS) strips Use 1 each As Directed 4 (four) times a day. Dispense brand per patient's insurance at pharmacy discretion. 100 strip 3     calcium, as carbonate, (TUMS) 200 mg calcium (500 mg) chewable tablet Chew 1 tablet (200 mg total) daily. 100 tablet 3     generic lancets (FINGERSTIX LANCETS) Use 1 each As Directed 4 (four) times a day. Dispense brand per patient's insurance 100 each 3     insulin aspart U-100 (NOVOLOG FLEXPEN U-100 INSULIN) 100 unit/mL (3 mL) injection pen Take 4 units right before eating lunch and dinner.  Increase as directed, max dose of 50 units daily. 15 mL 3     insulin glargine (LANTUS SOLOSTAR U-100 INSULIN) 100 unit/mL (3 mL) pen Take twice daily as directed, up to 60 units a day 5 adj dose pen 2     insulin lispro (HUMALOG KWIKPEN INSULIN) 100 unit/mL pen Take 4 units right before eating lunch and dinner.  Increase as directed, max dose of 50 units daily. 15 mL 0     metFORMIN (GLUCOPHAGE) 1000 MG tablet TAKE 1 TABLET (1,000 MG TOTAL) BY MOUTH 2 (TWO) TIMES A DAY WITH MEALS FOR DIABETES 180  "tablet 3     omeprazole (PRILOSEC) 20 MG capsule Take 1 capsule (20 mg total) by mouth daily before breakfast. 30 capsule 11     pen needle, diabetic 32 gauge x 5/32\" Ndle Use 1 each As Directed 3 (three) times a day. 100 each 1     prenatal vit-iron fum-folic ac (PRENATAL VITAMIN) 27 mg iron- 0.8 mg Tab tablet Take 1 tablet by mouth daily. 100 tablet 3     No facility-administered medications prior to visit.            Phone call duration: 20 minutes    Emile SUMNERP-C        "

## 2021-06-09 NOTE — TELEPHONE ENCOUNTER
Just got meat time insulin today in mail  [id number], vegetables, rice-white not sure how much, eats until full  Ate about 9am this morning  Checked about 4 hours later-154  NIKI GDM Care Plan      Assessment/Plan: Spoke with [facility] via professional  via telephone for follow-up today.  Stated she is doing well.  She is currently taking Lantus 18 units in the morning and 22 units at night.  She received her meal time insulin today, has not started it yet.  Blood sugar readings     6/25  F 104  L 201     6/26  F  101  B  83  D  136     6/27  F 117  L  88     6/28  F 108  L  85  Before bed  160     6/29  F  84  L 201  Before bed  181     6/30  F 92  L 176  Before bed 143     7/1  F 107  L 157  Before bed 132     7/2  F 107  L 159  Her meals consist of meat, vegetables and white rice.  Stated she is not sure how much rice she eats, she eats until she is full.  Discussed reason for checking blood sugars when recommended as she is not doing this now.  Reviewed when to check glucose for safe insulin at adjustments.  Start 6 units of insulin before each meal.  Increase Lantus to 22 units in the morning and 26 units at night.  Follow-up next week.    Visit Type: pregnancy clinic   Provider: Bert  Provider's Diagnosis (per referral form):     Weight:    Lab Results   Component Value Date    HGBA1C 7.4 (H) 03/25/2020       Estimated Date of Delivery: 10/14/20   Pregnancy #: 6  Previous GDM: Yes   Medications:   Current Outpatient Medications:      acetaminophen (TYLENOL) 325 MG tablet, Take 2 tablets (650 mg total) by mouth every 6 (six) hours as needed for pain., Disp: 100 tablet, Rfl: 1     aspirin 81 MG EC tablet, Take 1 tablet (81 mg total) by mouth daily., Disp: 90 tablet, Rfl: 2     blood glucose test (CONTOUR TEST STRIPS) strips, Use 1 each As Directed 4 (four) times a day. Dispense brand per patient's insurance at pharmacy discretion., Disp: 100 strip, Rfl: 3     calcium, as carbonate, (TUMS) 200 mg calcium  "(500 mg) chewable tablet, Chew 1 tablet (200 mg total) daily., Disp: 100 tablet, Rfl: 3     generic lancets (FINGERSTIX LANCETS), Use 1 each As Directed 4 (four) times a day. Dispense brand per patient's insurance, Disp: 100 each, Rfl: 3     insulin aspart U-100 (NOVOLOG FLEXPEN U-100 INSULIN) 100 unit/mL (3 mL) injection pen, Take 4 units right before eating lunch and dinner.  Increase as directed, max dose of 50 units daily., Disp: 15 mL, Rfl: 3     insulin glargine (LANTUS SOLOSTAR U-100 INSULIN) 100 unit/mL (3 mL) pen, Take twice daily as directed, up to 60 units a day, Disp: 5 adj dose pen, Rfl: 2     insulin lispro (HUMALOG KWIKPEN INSULIN) 100 unit/mL pen, Take 4 units right before eating lunch and dinner.  Increase as directed, max dose of 50 units daily., Disp: 15 mL, Rfl: 0     metFORMIN (GLUCOPHAGE) 1000 MG tablet, TAKE 1 TABLET (1,000 MG TOTAL) BY MOUTH 2 (TWO) TIMES A DAY WITH MEALS FOR DIABETES, Disp: 180 tablet, Rfl: 3     omeprazole (PRILOSEC) 20 MG capsule, Take 1 capsule (20 mg total) by mouth daily before breakfast., Disp: 30 capsule, Rfl: 11     pen needle, diabetic 32 gauge x 5/32\" Ndle, Use 1 each As Directed 3 (three) times a day., Disp: 100 each, Rfl: 1     prenatal vit-iron fum-folic ac (PRENATAL VITAMIN) 27 mg iron- 0.8 mg Tab tablet, Take 1 tablet by mouth daily., Disp: 100 tablet, Rfl: 3      BG monitoring goals: Fasting <95; 1 hour post start of meal <140. Test 4 x per day.  Check fasting a.m. ketones: No  GDM meal pattern/carb counting taught per guidelines: Yes    Past Goals:  Nutrition: GDM meal plan -Met  Activity: Walking after meals when able/staying active -Met  Monitoring: BG 4x/day as directed, ketones every morning -Met      New Goals:  Nutrition: GDM meal plan   Activity: Walking after meals when able/staying active   Monitoring: BG 4x/day as directed, ketones every morning    DIABETES CARE PLAN AND EDUCATION RECORD    Gestational Diabetes Disease Process/Preconception " Care/Management During Pregnancy/Postpartum:Discussed    Meter (per above goals): Assessed and Discussed    Nutrition Management    Weight: Assessed and Discussed  Portions/Balance: Assessed and Discussed  Carb ID/Count: Assessed and Discussed  Label Reading: Assessed and Discussed  Menu Planning: Assessed and Discussed  Dining Out: Assessed and Discussed  Physical Activity: Assessed and Discussed    Acute Complications: Prevent, Detect, Treat:    Goal Setting and Problem Solving: Assessed and Discussed  Barriers: Assessed and Discussed  Psychosocial Adjustments: Assessed and Discussed     I agree with the aforementioned diabetes plan.  Betsy OBRIEN [facility]  7/7/2020  10:49 AM

## 2021-06-09 NOTE — PROGRESS NOTES
"SUBJECTIVE:  Stacey Melendez is a 40 y.o. female  at 28w1d. Estimated Date of Delivery: 10/14/20.  She is doing well. She denies nausea and vomiting. She denies abdominal pain/cramping, vaginal bleeding, leakage of fluid. Fetal movement is present.   Concerns: follows with diabetes education and endocrinology for management with insulin and metformin. Had MFM visit- plan for twice weekly surveillance starting at 32 weeks- delivery by 39 weeks if not earlier depending on glycemic control. Last growth US normal. On aspirin.   ROS  Negative except as noted above in HPI.  OBJECTIVE:  Blood pressure 108/62, pulse 88, temperature 98.2  F (36.8  C), temperature source Oral, resp. rate 20, height 4' 10.25\" (1.48 m), weight 145 lb (65.8 kg), last menstrual period 2020, SpO2 97 %, not currently breastfeeding.  Gen: comfortable, no acute distress.  See OB Vitals flowsheet.  ASSESSMENT/PLAN:  Stacey was seen today for routine prenatal visit.    Diagnoses and all orders for this visit:    Pre-existing type 2 diabetes mellitus during pregnancy in second trimester: continue checking sugars four times a day, she sometimes has elevated post-prandials after larger meals. Follows with endocrinology/diabetes education as well as co-managed with MFM. Had normal fetal echo. On metformin, insulin, aspirin. Growth US normal- start twice weekly  surveillance starting at 32 weeks with MFM. Encouraged adequate hydration.  -     Urinalysis, OB Screen (ketones, glucose, protein only)    Bilateral carpal tunnel syndrome: Improved with wrist splints.    High-risk pregnancy, elderly multigravida, unspecified trimester: Progenity normal.     Other orders  -     Tdap vaccine,  8yo or older,  IM patient left before getting tdap    -IUP at 28w1d:   Prenatal labs reviewed   Peripartum Anesthesia: need to discuss  Post-partum Contraception: depo or pill   Breast/Bottle: breast and formula  RTC in 2 weeks or sooner with problems. Schedule " with Dr. Gan in 2 weeks. Will need tdap at next visit.    Visit completed along with assistance of Wendy .    Dianne Vasquez MD

## 2021-06-09 NOTE — PROGRESS NOTES
"Stacey Melendez is a 40 y.o. female who is being evaluated via a billable telephone visit.      The patient has been notified of following:     \"This telephone visit will be conducted via a call between you and your physician/provider. We have found that certain health care needs can be provided without the need for a physical exam.  This service lets us provide the care you need with a short phone conversation.  If a prescription is necessary we can send it directly to your pharmacy.  If lab work is needed we can place an order for that and you can then stop by our lab to have the test done at a later time.    Telephone visits are billed at different rates depending on your insurance coverage. During this emergency period, for some insurers they may be billed the same as an in-person visit.  Please reach out to your insurance provider with any questions.    If during the course of the call the physician/provider feels a telephone visit is not appropriate, you will not be charged for this service.\"    Patient has given verbal consent to a Telephone visit? Yes    What phone number would you like to be contacted at?     Patient would like to receive their AVS by AVS Preference: Mail a copy.    Additional provider notes:       Reason for visit      1. Pre-existing type 2 diabetes mellitus during pregnancy in second trimester        HPI     Stacey Melendez is a very pleasant 39 y.o. old female who presents for management of Diabetes Mellitus during pregnancy.  She is currently 25w1d  weeks pregnant . Due date is 10/14/2020    Current carbohydrate intake:consistent with recommendations of 30g-60g-60g.  I have reviewed her blood glucose logs and note that the:  Fasting readings  are:above range on current regimen  Postprandial readings are:above range on current regimen  Current Lantus dose: 24 AM  24 PM  Current Prandial insulin:   Blood glucose logs/meter brought in and data reviewed and incorporated into " decision-making.  Planned delivery at: Unknown  OBGYN: [facility]  Therapy/Interventions in the past:  She has been seen by the Diabetes Educator- and has received instruction on carbohydrate counting and  consistency.  Records from referring provider and other sources have also been reviewed and incorporated into decision-making.      TODAY:   [facility] is contacted today in f/u for management of DM 2 during pregnancy.  We are joined by SOTERO Ma and a professional . She has supplied BG today and noted that her FBS look good. She has had some post lunch elevations and we will increase her dose. Also noted, she had a 73 after dinner. She states that she went too long between meals. She continues on Metformin. Her OB has no current concerns. Baby is moving appropriately and she is having no swelling     Blood Sugar Readings    7-9  F 90  L 149  D 197    7-10  F 101  L 176  D 150    7-11  F 81  L 177  D 159    7-12  F 90  L 132  D 138    7-13  F 93  D 101    7-14  F 120  L 204  D 73    7-15  F 107  L 210  D 98    7-16  F 113  L 188      Past Medical History       Patient Active Problem List   Diagnosis     Bilateral carpal tunnel syndrome     Gastroesophageal reflux disease with esophagitis     Learning problem     Diabetes (H)     Hyperlipidemia LDL goal <100     High-risk pregnancy, elderly multigravida, unspecified trimester     Pre-existing type 2 diabetes mellitus during pregnancy in second trimester        Past Surgical History     History reviewed. No pertinent surgical history.    Family History     Family History   Problem Relation Age of Onset     Hyperlipidemia Mother      Diabetes Mother      Cancer Neg Hx      Breast cancer Neg Hx      Hypertension Neg Hx        Social History     Social History     Tobacco Use     Smoking status: Never Smoker     Smokeless tobacco: Former User     Types: Chew     Tobacco comment: chews betel nut NO Tobacco   Substance Use Topics     Alcohol use: No     Drug  use: No       Review of Systems     Patient has no polyuria or polydipsia, no chest pain, dyspnea or TIA's, no numbness, tingling or pain in extremities  Remainder negative except as noted in HPI.      Vital Signs     LMP 2020 (Exact Date)   Wt Readings from Last 3 Encounters:   07/10/20 144 lb (65.3 kg)   20 142 lb 12 oz (64.8 kg)   20 140 lb (63.5 kg)           Assessment     1. Pre-existing type 2 diabetes mellitus during pregnancy in second trimester        Plan       1.  DIABETES in pregnancy -  Adjust dose as follows:     -Lantus insulin 24 AM, 24 PM. Increase by 2 units every 2 days to keep fasting blood glucose below 95mg/dL  -Novolog 6  units with breakfast  -Novolog 8 units with lunch   -Novolog 6 units with dinner  -Increase by 2 units every 2 days to keep 1 hour after meal blood glucose less than 140mg/dL     We reviewed glucose goals of fasting blood glucose <95 mg/dL and 1 hour post prandial blood glucose of <140 mg/dL.    Monitor blood sugar 4 times daily: Fasting  and 1 hour after each meal.  Contact  this clinic [phone] if blood glucose is not within the above-mentioned goals.      We discussed the importance of excellent glycemic control during pregnancy to limit complications such as fetal macrosomia, shoulder dystocia,  hypoglycemia and hyperbilirubinemia.  I have discussed the patient's increased risk of recurrent GDM and/or development of type 2 diabetes later in life.     F/u in 1 week      Lab Results     Hemoglobin A1c   Date Value Ref Range Status   2020 7.4 (H) 3.5 - 6.0 % Final   2020 8.2 (H) 3.5 - 6.0 % Final   2019 6.8 (H) 3.5 - 6.0 % Final   2019 8.7 (H) 3.5 - 6.0 % Final   2019 8.1 (H) 3.5 - 6.0 % Final     Creatinine   Date Value Ref Range Status   2020 0.62 0.60 - 1.10 mg/dL Final   2019 0.75 0.60 - 1.10 mg/dL Final   2018 0.73 0.60 - 1.10 mg/dL Final     Microalbumin, Random Urine   Date Value Ref Range  Status   01/07/2020 2.24 (H) 0.00 - 1.99 mg/dL Final       Cholesterol   Date Value Ref Range Status   02/07/2020 182 <=199 mg/dL Final     HDL Cholesterol   Date Value Ref Range Status   02/07/2020 43 (L) >=50 mg/dL Final     LDL Calculated   Date Value Ref Range Status   02/07/2020 67 <=129 mg/dL Final     Triglycerides   Date Value Ref Range Status   02/07/2020 361 (H) <=149 mg/dL Final       Lab Results   Component Value Date    ALT 10 05/22/2020    AST 15 05/22/2020    ALKPHOS 56 12/26/2018    BILITOT 0.8 12/26/2018         Current Medications     Outpatient Medications Prior to Visit   Medication Sig Dispense Refill     acetaminophen (TYLENOL) 325 MG tablet Take 2 tablets (650 mg total) by mouth every 6 (six) hours as needed for pain. 100 tablet 1     aspirin 81 MG EC tablet Take 1 tablet (81 mg total) by mouth daily. 90 tablet 2     blood glucose test (CONTOUR TEST STRIPS) strips Use 1 each As Directed 4 (four) times a day. Dispense brand per patient's insurance at pharmacy discretion. 100 strip 6     calcium, as carbonate, (TUMS) 200 mg calcium (500 mg) chewable tablet Chew 1 tablet (200 mg total) daily. 100 tablet 3     generic lancets (FINGERSTIX LANCETS) Use 1 each As Directed 4 (four) times a day. Dispense brand per patient's insurance 100 each 6     insulin aspart U-100 (NOVOLOG FLEXPEN U-100 INSULIN) 100 unit/mL (3 mL) injection pen Take 4 units right before eating lunch and dinner.  Increase as directed, max dose of 50 units daily. 15 mL 3     insulin glargine (LANTUS SOLOSTAR U-100 INSULIN) 100 unit/mL (3 mL) pen Take twice daily as directed, up to 60 units a day 5 adj dose pen 2     insulin lispro (HUMALOG KWIKPEN INSULIN) 100 unit/mL pen Take 4 units right before eating lunch and dinner.  Increase as directed, max dose of 50 units daily. 15 mL 0     metFORMIN (GLUCOPHAGE) 1000 MG tablet TAKE 1 TABLET (1,000 MG TOTAL) BY MOUTH 2 (TWO) TIMES A DAY WITH MEALS FOR DIABETES 180 tablet 3     omeprazole  "(PRILOSEC) 20 MG capsule Take 1 capsule (20 mg total) by mouth daily before breakfast. 30 capsule 11     pen needle, diabetic 32 gauge x 5/32\" Ndle Use 1 each As Directed 3 (three) times a day. 100 each 1     prenatal vit-iron fum-folic ac (PRENATAL VITAMIN) 27 mg iron- 0.8 mg Tab tablet Take 1 tablet by mouth daily. 100 tablet 3     No facility-administered medications prior to visit.            Phone call duration: 18 minutes    Emile SUMNERP-C        "

## 2021-06-09 NOTE — PROGRESS NOTES
"Stacey Melendez is a 40 y.o. female who is being evaluated via a billable telephone visit.      The patient has been notified of following:     \"This telephone visit will be conducted via a call between you and your physician/provider. We have found that certain health care needs can be provided without the need for a physical exam.  This service lets us provide the care you need with a short phone conversation.  If a prescription is necessary we can send it directly to your pharmacy.  If lab work is needed we can place an order for that and you can then stop by our lab to have the test done at a later time.    Telephone visits are billed at different rates depending on your insurance coverage. During this emergency period, for some insurers they may be billed the same as an in-person visit.  Please reach out to your insurance provider with any questions.    If during the course of the call the physician/provider feels a telephone visit is not appropriate, you will not be charged for this service.\"    Patient has given verbal consent to a Telephone visit? Yes    What phone number would you like to be contacted at?     Patient would like to receive their AVS by AVS Preference: Juliocesar.    Additional provider notes:       Reason for visit      1. Pre-existing type 2 diabetes mellitus during pregnancy in second trimester        HPI     Stacey Melendez is a very pleasant 39 y.o. old female who presents for management of Diabetes Mellitus during pregnancy.  She is currently 24w1d  weeks pregnant . Due date is 10/14/2020    Current carbohydrate intake:consistent with recommendations of 30g-60g-60g.  I have reviewed her blood glucose logs and note that the:  Fasting readings  are:above range on current regimen  Postprandial readings are:above range on current regimen  Current Lantus dose: 18 AM  24 PM  Current Prandial insulin: 0  Blood glucose logs/meter brought in and data reviewed and incorporated into " "decision-making.  Planned delivery at: Unknown  OBGYN: [facility]    Therapy/Interventions in the past:  She has been seen by the Diabetes Educator- and has received instruction on carbohydrate counting and  consistency.  Records from referring provider and other sources have also been reviewed and incorporated into decision-making.      TODAY:    [facility] is contacted today in f/u for management of DM 2 during pregnancy. We are joined by SOTERO Caraballo and a professional . She has provided her BG for us and continues to have elevations. She has not yet picked up the prandial insulin. She also has missing data. She reports that she is eating breakfast and lunch regularly and just snacks in the evening.  It is the \"only snacking\" that is giving her some occasional lows in the morning. We have discussed this in previous visits. She has not seen her OB in the last week.  She notes that she is feeling baby move and that she is having no swelling.     Blood Sugar Readings:    6-18  F 105  L 196    6-19  F 92  L 77    6-20  F 78  L 99    6-21  F 80  L 61  Before bed 164     6-22  F 78  L 88    6-23  F 80  L 107  Before bed 118    6-24  F 103  L 91  Before bed 134    6-25  F 104  L 201      Past Medical History       Patient Active Problem List   Diagnosis     Bilateral carpal tunnel syndrome     Gastroesophageal reflux disease with esophagitis     Learning problem     Diabetes (H)     Hyperlipidemia LDL goal <100     High-risk pregnancy, elderly multigravida, unspecified trimester     Pre-existing type 2 diabetes mellitus during pregnancy in second trimester        Past Surgical History     No past surgical history on file.    Family History     Family History   Problem Relation Age of Onset     Hyperlipidemia Mother      Diabetes Mother      Cancer Neg Hx      Breast cancer Neg Hx      Hypertension Neg Hx        Social History     Social History     Tobacco Use     Smoking status: Never Smoker     Smokeless tobacco: " Former User     Types: Chew     Tobacco comment: chews betel nut NO Tobacco   Substance Use Topics     Alcohol use: No     Drug use: No       Review of Systems     Patient has no polyuria or polydipsia, no chest pain, dyspnea or TIA's, no numbness, tingling or pain in extremities  Remainder negative except as noted in HPI.      Vital Signs     LMP 2020 (Exact Date)   Wt Readings from Last 3 Encounters:   20 142 lb 12 oz (64.8 kg)   20 140 lb (63.5 kg)   20 141 lb (64 kg)         Assessment     1. Pre-existing type 2 diabetes mellitus during pregnancy in second trimester        Plan       1.  DIABETES in pregnancy -  Adjust dose as follows:     -Lantus insulin 24 AM, 18 PM. Increase by 2 units every 2 days to keep fasting blood glucose below 95mg/dL     We reviewed glucose goals of fasting blood glucose <95 mg/dL and 1 hour post prandial blood glucose of <140 mg/dL.    Monitor blood sugar 4 times daily: Fasting  and 1 hour after each meal.  Contact  this clinic [phone] if blood glucose is not within the above-mentioned goals.      We discussed the importance of excellent glycemic control during pregnancy to limit complications such as fetal macrosomia, shoulder dystocia,  hypoglycemia and hyperbilirubinemia.  I have discussed the patient's increased risk of recurrent GDM and/or development of type 2 diabetes later in life.          F/u in 1 week.          Lab Results     Hemoglobin A1c   Date Value Ref Range Status   2020 7.4 (H) 3.5 - 6.0 % Final   2020 8.2 (H) 3.5 - 6.0 % Final   2019 6.8 (H) 3.5 - 6.0 % Final   2019 8.7 (H) 3.5 - 6.0 % Final   2019 8.1 (H) 3.5 - 6.0 % Final     Creatinine   Date Value Ref Range Status   2020 0.62 0.60 - 1.10 mg/dL Final   2019 0.75 0.60 - 1.10 mg/dL Final   2018 0.73 0.60 - 1.10 mg/dL Final     Microalbumin, Random Urine   Date Value Ref Range Status   2020 2.24 (H) 0.00 - 1.99 mg/dL Final  "      Cholesterol   Date Value Ref Range Status   02/07/2020 182 <=199 mg/dL Final     HDL Cholesterol   Date Value Ref Range Status   02/07/2020 43 (L) >=50 mg/dL Final     LDL Calculated   Date Value Ref Range Status   02/07/2020 67 <=129 mg/dL Final     Triglycerides   Date Value Ref Range Status   02/07/2020 361 (H) <=149 mg/dL Final       Lab Results   Component Value Date    ALT 10 05/22/2020    AST 15 05/22/2020    ALKPHOS 56 12/26/2018    BILITOT 0.8 12/26/2018         Current Medications     Outpatient Medications Prior to Visit   Medication Sig Dispense Refill     acetaminophen (TYLENOL) 325 MG tablet Take 2 tablets (650 mg total) by mouth every 6 (six) hours as needed for pain. 100 tablet 1     aspirin 81 MG EC tablet Take 1 tablet (81 mg total) by mouth daily. 90 tablet 2     blood glucose test (CONTOUR TEST STRIPS) strips Use 1 each As Directed 4 (four) times a day. Dispense brand per patient's insurance at pharmacy discretion. 100 strip 3     calcium, as carbonate, (TUMS) 200 mg calcium (500 mg) chewable tablet Chew 1 tablet (200 mg total) daily. 100 tablet 3     generic lancets (FINGERSTIX LANCETS) Use 1 each As Directed 4 (four) times a day. Dispense brand per patient's insurance 100 each 3     insulin glargine (LANTUS SOLOSTAR U-100 INSULIN) 100 unit/mL (3 mL) pen Take twice daily as directed, up to 60 units a day 5 adj dose pen 2     insulin lispro (HUMALOG KWIKPEN INSULIN) 100 unit/mL pen Take 4 units right before eating lunch and dinner.  Increase as directed, max dose of 50 units daily. 15 mL 0     metFORMIN (GLUCOPHAGE) 1000 MG tablet TAKE 1 TABLET (1,000 MG TOTAL) BY MOUTH 2 (TWO) TIMES A DAY WITH MEALS FOR DIABETES 180 tablet 3     omeprazole (PRILOSEC) 20 MG capsule Take 1 capsule (20 mg total) by mouth daily before breakfast. 30 capsule 11     pen needle, diabetic 32 gauge x 5/32\" Ndle Use 1 each As Directed 3 (three) times a day. 100 each 1     prenatal vit-iron fum-folic ac (PRENATAL " VITAMIN) 27 mg iron- 0.8 mg Tab tablet Take 1 tablet by mouth daily. 100 tablet 3     insulin lispro (HUMALOG KWIKPEN INSULIN) 100 unit/mL pen Take 10 units before each meal every day.  Increase as directed.  Max daily dose 50 units. 5 adj dose pen 2     No facility-administered medications prior to visit.        Phone call duration: 19 minutes    Emile SUMNERP-C

## 2021-06-10 NOTE — PROGRESS NOTES
[facility] GDM Care Plan      Assessment/Plan: Spoke with [facility] via professional  via telephone today for her follow-up.  She is eating 2 meals a day as she is waking later in the day. She is currently taking Lantus 24 units in the morning and 26 units in the evening.  She is also taking Novolog 8 units before meals.    Blood Sugar Readings     7-22  F 90  B 199     7-23  F 99  B 240     7-24  F 96  B 206     7-25  F 85       7-26  F 114       7-28  F 111       7-29  F 91  L 165  Stated she is eating rice, meat and vegetables for both her meals.  Stated she is eating the same amount of rice all the time.    Reading of 85 on current dose is a bit low, will not adjust Lantus at this time.  Increase Novolog to 10 units before meals.  Follow-up next week.    Visit Type: pregnancy clinic   Provider: Christina  Provider's Diagnosis (per referral form): Gestational (648.83)    Weight:    Lab Results   Component Value Date    HGBA1C 7.4 (H) 03/25/2020       Estimated Date of Delivery: 10/14/20   Pregnancy #: 6  Previous GDM: No   Medications:   Current Outpatient Medications:      acetaminophen (TYLENOL) 325 MG tablet, Take 2 tablets (650 mg total) by mouth every 6 (six) hours as needed for pain., Disp: 100 tablet, Rfl: 1     aspirin 81 MG EC tablet, Take 1 tablet (81 mg total) by mouth daily., Disp: 90 tablet, Rfl: 2     blood glucose test (CONTOUR TEST STRIPS) strips, Use 1 each As Directed 4 (four) times a day. Dispense brand per patient's insurance at pharmacy discretion., Disp: 100 strip, Rfl: 6     calcium, as carbonate, (TUMS) 200 mg calcium (500 mg) chewable tablet, Chew 1 tablet (200 mg total) daily., Disp: 100 tablet, Rfl: 3     generic lancets (FINGERSTIX LANCETS), Use 1 each As Directed 4 (four) times a day. Dispense brand per patient's insurance, Disp: 100 each, Rfl: 6     insulin aspart U-100 (NOVOLOG FLEXPEN U-100 INSULIN) 100 unit/mL (3 mL) injection pen, Take 4 units right before eating  "lunch and dinner.  Increase as directed, max dose of 50 units daily., Disp: 15 mL, Rfl: 3     insulin glargine (LANTUS SOLOSTAR U-100 INSULIN) 100 unit/mL (3 mL) pen, Take twice daily as directed, up to 60 units a day, Disp: 5 adj dose pen, Rfl: 2     insulin lispro (HUMALOG KWIKPEN INSULIN) 100 unit/mL pen, Take 4 units right before eating lunch and dinner.  Increase as directed, max dose of 50 units daily., Disp: 15 mL, Rfl: 0     metFORMIN (GLUCOPHAGE) 1000 MG tablet, TAKE 1 TABLET (1,000 MG TOTAL) BY MOUTH 2 (TWO) TIMES A DAY WITH MEALS FOR DIABETES, Disp: 180 tablet, Rfl: 3     omeprazole (PRILOSEC) 20 MG capsule, Take 1 capsule (20 mg total) by mouth daily before breakfast., Disp: 30 capsule, Rfl: 11     pen needle, diabetic 32 gauge x 5/32\" Ndle, Use 1 each As Directed 3 (three) times a day., Disp: 100 each, Rfl: 1     prenatal vit-iron fum-folic ac (PRENATAL VITAMIN) 27 mg iron- 0.8 mg Tab tablet, Take 1 tablet by mouth daily., Disp: 100 tablet, Rfl: 3      BG monitoring goals: Fasting <95; 1 hour post start of meal <140. Test 4 x per day.  Check fasting a.m. ketones: No  GDM meal pattern/carb counting taught per guidelines: Yes    Past Goals:  Nutrition: GDM meal plan -Met  Activity: Walking after meals when able/staying active -Met  Monitoring: BG 4x/day as directed, ketones every morning -Met      New Goals:  Nutrition: GDM meal plan   Activity: Walking after meals when able/staying active   Monitoring: BG 4x/day as directed, ketones every morning    DIABETES CARE PLAN AND EDUCATION RECORD    Gestational Diabetes Disease Process/Preconception Care/Management During Pregnancy/Postpartum:Discussed    Meter (per above goals): Assessed and Discussed    Nutrition Management    Weight: Assessed and Discussed  Portions/Balance: Assessed and Discussed  Carb ID/Count: Assessed and Discussed  Label Reading: Assessed and Discussed  Menu Planning: Assessed and Discussed  Dining Out: Assessed and Discussed  Physical " Activity: Assessed and Discussed    Acute Complications: Prevent, Detect, Treat:    Goal Setting and Problem Solving: Assessed and Discussed  Barriers: Assessed and Discussed  Psychosocial Adjustments: Assessed and Discussed

## 2021-06-10 NOTE — PROGRESS NOTES
"SUBJECTIVE:  Stacey Melendez is a 40 y.o. female  at 32w6d by exact LMP c/w 1st tri US. Estimated Date of Delivery: 10/14/20.  She is doing well. She denies vaginal bleeding, leakage of fluid, or urinary symptoms. Fetal movement is present. She is not having contractions.  Concerns: She follows with MFM- has started  surveillance with twice weekly BPPs. She also follows with diabetes education/endocrine- she has been adjusting her insulin. She needs a confirmation of pregnancy form for the [facility] and also reports that she does not have car seat today.  ROS  Negative except as noted above in HPI  OBJECTIVE:  Blood pressure 106/60, pulse 84, temperature 98.4  F (36.9  C), temperature source Oral, resp. rate 16, height 4' 10.25\" (1.48 m), weight 149 lb (67.6 kg), last menstrual period 2020, SpO2 99 %, not currently breastfeeding.  Gen: Comfortable, no acute distress.  Abd: Gravid, non-tender  See OB Vitals flowsheet.  ASSESSMENT/PLAN:  Stacey was seen today for routine prenatal visit and request for letter.    Diagnoses and all orders for this visit:    High-risk pregnancy, elderly multigravida, unspecified trimester  -     Urinalysis, OB Screen (ketones, glucose, protein only)- normal  -     Ambulatory referral to Care Management (Primary Care)- referred for car seat. I also filled out confirmation of pregnancy form for patient to give to [facility].    Insulin dependent diabetes in pregnancy: Co-managed with MFM as well as diabetes education/endocrinology- reviewed notes. Continues to have elevated postprandials- she reports insulin compliance and has follow-up scheduled. Growth US have been normal and she was started  surveillance with MFM. Continue UOB at each visit.     -IUP at 32w6d:   Prenatal labs reviewed   Fetal survey was done   Peripartum Anesthesia: the patient does not desire an epidural during labor.   Post-partum Contraception: depo or pill  Breast/Bottle: breast and formula "   Reviewed plans for getting to the hospital. Reviewed third trimester warning signs.   RTC in 2 weeks or sooner with problems. UOB at each visit.    Visit completed along with assistance of Wendy .    Dianne Vasquez MD

## 2021-06-10 NOTE — PROGRESS NOTES
"Please see \"Imaging\" tab under \"Chart Review\" for details of today's visit.    Bakari Curran        "

## 2021-06-10 NOTE — PROGRESS NOTES
"Stacey Melendez is a 40 y.o. female who is being evaluated via a billable telephone visit.      The patient has been notified of following:     \"This telephone visit will be conducted via a call between you and your physician/provider. We have found that certain health care needs can be provided without the need for a physical exam.  This service lets us provide the care you need with a short phone conversation.  If a prescription is necessary we can send it directly to your pharmacy.  If lab work is needed we can place an order for that and you can then stop by our lab to have the test done at a later time.    Telephone visits are billed at different rates depending on your insurance coverage. During this emergency period, for some insurers they may be billed the same as an in-person visit.  Please reach out to your insurance provider with any questions.    If during the course of the call the physician/provider feels a telephone visit is not appropriate, you will not be charged for this service.\"    Patient has given verbal consent to a Telephone visit? Yes    What phone number would you like to be contacted at? [phone]    Patient would like to receive their AVS by       Additional provider notes:       Reason for visit      1. Pre-existing type 2 diabetes mellitus during pregnancy in second trimester        HPI     Stacey Melendez is a very pleasant 39 y.o. old female who presents for management of Diabetes Mellitus during pregnancy.  She is currently 29w1d  weeks pregnant . Due date is 10/14/2020    Current carbohydrate intake:consistent with recommendations of 30g-60g-60g.  I have reviewed her blood glucose logs and note that the:  Fasting readings  are:above range on current regimen  Postprandial readings are:above range on current regimen  Current Lantus dose: 24 AM  26 PM  Current Prandial insulin:   Blood glucose logs/meter brought in and data reviewed and incorporated into decision-making.  Planned " delivery at: Unknown  [facility]: [facility]    Therapy/Interventions in the past:  She has been seen by the Diabetes Educator- and has received instruction on carbohydrate counting and  consistency.  Records from referring provider and other sources have also been reviewed and incorporated into decision-making.      TODAY:    [facility] is contacted today in f/u for GDM. We are joined by SOTERO Caraballo and a Professional . She has supplied BG for us today and while her FBS are OK, her postprandial readings are high. We will increase her prandial insulin doses to 12. Baby is moving.       Blood Sugar Readings    7-22  F 90  B 199    7-23  F 99  B 240    7-24  F 96  B 206    7-25  F 85      7-26  F 114      7-28  F 111      7-29  F 91  L 165      Past Medical History       Patient Active Problem List   Diagnosis     Bilateral carpal tunnel syndrome     Gastroesophageal reflux disease with esophagitis     Learning problem     Diabetes (H)     Hyperlipidemia LDL goal <100     High-risk pregnancy, elderly multigravida, unspecified trimester     Pre-existing type 2 diabetes mellitus during pregnancy in second trimester        Past Surgical History     No past surgical history on file.    Family History     Family History   Problem Relation Age of Onset     Hyperlipidemia Mother      Diabetes Mother      Cancer Neg Hx      Breast cancer Neg Hx      Hypertension Neg Hx        Social History     Social History     Tobacco Use     Smoking status: Never Smoker     Smokeless tobacco: Former User     Types: Chew     Tobacco comment: chews betel nut NO Tobacco   Substance Use Topics     Alcohol use: No     Drug use: No       Review of Systems     Patient has no polyuria or polydipsia, no chest pain, dyspnea or TIA's, no numbness, tingling or pain in extremities  Remainder negative except as noted in HPI.      Vital Signs     LMP 01/08/2020 (Exact Date)   Wt Readings from Last 3 Encounters:   07/23/20 145 lb (65.8  kg)   07/10/20 144 lb (65.3 kg)   20 142 lb 12 oz (64.8 kg)         Assessment     1. Pre-existing type 2 diabetes mellitus during pregnancy in second trimester        Plan          1.  DIABETES in pregnancy -  Adjust dose as follows:     -Lantus insulin 24 AM, 26 PM. Increase by 2 units every 2 days to keep fasting blood glucose below 95mg/dL  -Novolog 12  units with breakfast     -Novolog 12 units with dinner  -Increase by 2 units every 2 days to keep 1 hour after meal blood glucose less than 140mg/dL     We reviewed glucose goals of fasting blood glucose <95 mg/dL and 1 hour post prandial blood glucose of <140 mg/dL.    Monitor blood sugar 4 times daily: Fasting  and 1 hour after each meal.  Contact  this clinic [phone] if blood glucose is not within the above-mentioned goals.      We discussed the importance of excellent glycemic control during pregnancy to limit complications such as fetal macrosomia, shoulder dystocia,  hypoglycemia and hyperbilirubinemia.  I have discussed the patient's increased risk of recurrent GDM and/or development of type 2 diabetes later in life.      F/u in 1 week          Lab Results     Hemoglobin A1c   Date Value Ref Range Status   2020 7.4 (H) 3.5 - 6.0 % Final   2020 8.2 (H) 3.5 - 6.0 % Final   2019 6.8 (H) 3.5 - 6.0 % Final   2019 8.7 (H) 3.5 - 6.0 % Final   2019 8.1 (H) 3.5 - 6.0 % Final     Creatinine   Date Value Ref Range Status   2020 0.62 0.60 - 1.10 mg/dL Final   2019 0.75 0.60 - 1.10 mg/dL Final   2018 0.73 0.60 - 1.10 mg/dL Final     Microalbumin, Random Urine   Date Value Ref Range Status   2020 2.24 (H) 0.00 - 1.99 mg/dL Final       Cholesterol   Date Value Ref Range Status   2020 182 <=199 mg/dL Final     HDL Cholesterol   Date Value Ref Range Status   2020 43 (L) >=50 mg/dL Final     LDL Calculated   Date Value Ref Range Status   2020 67 <=129 mg/dL Final     Triglycerides  "  Date Value Ref Range Status   02/07/2020 361 (H) <=149 mg/dL Final       Lab Results   Component Value Date    ALT 10 05/22/2020    AST 15 05/22/2020    ALKPHOS 56 12/26/2018    BILITOT 0.8 12/26/2018         Current Medications     Outpatient Medications Prior to Visit   Medication Sig Dispense Refill     acetaminophen (TYLENOL) 325 MG tablet Take 2 tablets (650 mg total) by mouth every 6 (six) hours as needed for pain. 100 tablet 1     aspirin 81 MG EC tablet Take 1 tablet (81 mg total) by mouth daily. 90 tablet 2     blood glucose test (CONTOUR TEST STRIPS) strips Use 1 each As Directed 4 (four) times a day. Dispense brand per patient's insurance at pharmacy discretion. 100 strip 6     calcium, as carbonate, (TUMS) 200 mg calcium (500 mg) chewable tablet Chew 1 tablet (200 mg total) daily. 100 tablet 3     generic lancets (FINGERSTIX LANCETS) Use 1 each As Directed 4 (four) times a day. Dispense brand per patient's insurance 100 each 6     insulin aspart U-100 (NOVOLOG FLEXPEN U-100 INSULIN) 100 unit/mL (3 mL) injection pen Take 4 units right before eating lunch and dinner.  Increase as directed, max dose of 50 units daily. 15 mL 3     insulin glargine (LANTUS SOLOSTAR U-100 INSULIN) 100 unit/mL (3 mL) pen Take twice daily as directed, up to 60 units a day 5 adj dose pen 2     insulin lispro (HUMALOG KWIKPEN INSULIN) 100 unit/mL pen Take 4 units right before eating lunch and dinner.  Increase as directed, max dose of 50 units daily. 15 mL 0     metFORMIN (GLUCOPHAGE) 1000 MG tablet TAKE 1 TABLET (1,000 MG TOTAL) BY MOUTH 2 (TWO) TIMES A DAY WITH MEALS FOR DIABETES 180 tablet 3     omeprazole (PRILOSEC) 20 MG capsule Take 1 capsule (20 mg total) by mouth daily before breakfast. 30 capsule 11     pen needle, diabetic 32 gauge x 5/32\" Ndle Use 1 each As Directed 3 (three) times a day. 100 each 1     prenatal vit-iron fum-folic ac (PRENATAL VITAMIN) 27 mg iron- 0.8 mg Tab tablet Take 1 tablet by mouth daily. 100 " tablet 3     No facility-administered medications prior to visit.            Phone call duration: 18 minutes    Emile ANDERSON

## 2021-06-10 NOTE — PROGRESS NOTES
"Stacey Melendez is a 40 y.o. female who is being evaluated via a billable telephone visit.      The patient has been notified of following:     \"This telephone visit will be conducted via a call between you and your physician/provider. We have found that certain health care needs can be provided without the need for a physical exam.  This service lets us provide the care you need with a short phone conversation.  If a prescription is necessary we can send it directly to your pharmacy.  If lab work is needed we can place an order for that and you can then stop by our lab to have the test done at a later time.    Telephone visits are billed at different rates depending on your insurance coverage. During this emergency period, for some insurers they may be billed the same as an in-person visit.  Please reach out to your insurance provider with any questions.    If during the course of the call the physician/provider feels a telephone visit is not appropriate, you will not be charged for this service.\"    Patient has given verbal consent to a Telephone visit? Yes    What phone number would you like to be contacted at? [phone]    Patient would like to receive their AVS by AVS Preference: Mail a copy.    Additional provider notes:       Reason for visit      1. Pre-existing type 2 diabetes mellitus during pregnancy, antepartum        HPI     Stacey Melendez is a very pleasant 39 y.o. old female who presents for management of Diabetes Mellitus during pregnancy.  She is currently 32w1d  weeks pregnant . Due date is 10/14/2020    Current carbohydrate intake:consistent with recommendations of 30g-60g-60g.  I have reviewed her blood glucose logs and note that the:  Fasting readings  are:above range on current regimen  Postprandial readings are:above range on current regimen  Current Lantus dose: 28 AM  28 PM  Current Prandial insulin: 1818  Blood glucose logs/meter brought in and data reviewed and incorporated into " decision-making.  Planned delivery at: Unknown  [facility]N: [facility]  Therapy/Interventions in the past:  She has been seen by the Diabetes Educator- and has received instruction on carbohydrate counting and  consistency.  Records from referring provider and other sources have also been reviewed and incorporated into decision-making.      TODAY:    Stacey is contacted today in f/u for management of DM in Pregnancy. We are joined by a Professional . She provides BG today and her FBS look better, but her mealtime numbers are higher - we will increase those doses. Baby is moving appropriately and she is having no swelling.     Blood Sugar Readings    8-13 Thursday  F 92  B 127  D 156    8-14 Friday  F 93  B 189  D 146    8-15 Saturday  F 95  B 198  D 210    8-16 Sunday  F 93  B 201  D 170    8-17 Monday  F 94  B 189  D 230    8-18 Tuesday  F 113  B 198  D 137    8-19 Wednesday  F 84  B 174  D 215    8-20 Thursday  F 83  B 206      Past Medical History       Patient Active Problem List   Diagnosis     Bilateral carpal tunnel syndrome     Gastroesophageal reflux disease with esophagitis     Learning problem     Diabetes (H)     Hyperlipidemia LDL goal <100     High-risk pregnancy, elderly multigravida, unspecified trimester     Pre-existing type 2 diabetes mellitus during pregnancy, antepartum        Past Surgical History     No past surgical history on file.    Family History     Family History   Problem Relation Age of Onset     Hyperlipidemia Mother      Diabetes Mother      Cancer Neg Hx      Breast cancer Neg Hx      Hypertension Neg Hx        Social History     Social History     Tobacco Use     Smoking status: Never Smoker     Smokeless tobacco: Former User     Types: Chew     Tobacco comment: chews betel nut NO Tobacco   Substance Use Topics     Alcohol use: No     Drug use: No       Review of Systems     Patient has no polyuria or polydipsia, no chest pain, dyspnea or TIA's, no numbness, tingling or pain in  extremities  Remainder negative except as noted in HPI.      Vital Signs     LMP 2020 (Exact Date)   Wt Readings from Last 3 Encounters:   08/10/20 148 lb 4 oz (67.2 kg)   20 145 lb (65.8 kg)   07/10/20 144 lb (65.3 kg)           Assessment     1. Pre-existing type 2 diabetes mellitus during pregnancy, antepartum        Plan       1.  DIABETES in pregnancy -  Adjust dose as follows:     -Lantus insulin 28 AM, 28 PM. Increase by 2 units every 2 days to keep fasting blood glucose below 95mg/dL  -Novolog 22  units with breakfast     -Novolog 22 units with dinner  -Increase by 2 units every 2 days to keep 1 hour after meal blood glucose less than 140mg/dL     We reviewed glucose goals of fasting blood glucose <95 mg/dL and 1 hour post prandial blood glucose of <140 mg/dL.    Monitor blood sugar 4 times daily: Fasting  and 1 hour after each meal.  Contact  this clinic [phone] if blood glucose is not within the above-mentioned goals.      We discussed the importance of excellent glycemic control during pregnancy to limit complications such as fetal macrosomia, shoulder dystocia,  hypoglycemia and hyperbilirubinemia.  I have discussed the patient's increased risk of recurrent GDM and/or development of type 2 diabetes later in life.      F/u in 1 week          Lab Results     Hemoglobin A1c   Date Value Ref Range Status   2020 7.4 (H) 3.5 - 6.0 % Final   2020 8.2 (H) 3.5 - 6.0 % Final   2019 6.8 (H) 3.5 - 6.0 % Final   2019 8.7 (H) 3.5 - 6.0 % Final   2019 8.1 (H) 3.5 - 6.0 % Final     Creatinine   Date Value Ref Range Status   2020 0.62 0.60 - 1.10 mg/dL Final   2019 0.75 0.60 - 1.10 mg/dL Final   2018 0.73 0.60 - 1.10 mg/dL Final     Microalbumin, Random Urine   Date Value Ref Range Status   2020 2.24 (H) 0.00 - 1.99 mg/dL Final       Cholesterol   Date Value Ref Range Status   2020 182 <=199 mg/dL Final     HDL Cholesterol   Date Value  "Ref Range Status   02/07/2020 43 (L) >=50 mg/dL Final     LDL Calculated   Date Value Ref Range Status   02/07/2020 67 <=129 mg/dL Final     Triglycerides   Date Value Ref Range Status   02/07/2020 361 (H) <=149 mg/dL Final       Lab Results   Component Value Date    ALT 10 05/22/2020    AST 15 05/22/2020    ALKPHOS 56 12/26/2018    BILITOT 0.8 12/26/2018         Current Medications     Outpatient Medications Prior to Visit   Medication Sig Dispense Refill     acetaminophen (TYLENOL) 325 MG tablet Take 2 tablets (650 mg total) by mouth every 6 (six) hours as needed for pain. 100 tablet 1     aspirin 81 MG EC tablet Take 1 tablet (81 mg total) by mouth daily. 90 tablet 2     blood glucose test (CONTOUR TEST STRIPS) strips Use 1 each As Directed 4 (four) times a day. Dispense brand per patient's insurance at pharmacy discretion. 100 strip 6     calcium, as carbonate, (TUMS) 200 mg calcium (500 mg) chewable tablet Chew 1 tablet (200 mg total) daily. 100 tablet 3     generic lancets (FINGERSTIX LANCETS) Use 1 each As Directed 4 (four) times a day. Dispense brand per patient's insurance 100 each 6     insulin aspart U-100 (NOVOLOG FLEXPEN U-100 INSULIN) 100 unit/mL (3 mL) injection pen Take 4 units right before eating lunch and dinner.  Increase as directed, max dose of 50 units daily. 15 mL 3     insulin glargine (LANTUS SOLOSTAR U-100 INSULIN) 100 unit/mL (3 mL) pen Take twice daily as directed, up to 60 units a day 5 adj dose pen 2     insulin lispro (HUMALOG KWIKPEN INSULIN) 100 unit/mL pen Take 4 units right before eating lunch and dinner.  Increase as directed, max dose of 50 units daily. 15 mL 0     metFORMIN (GLUCOPHAGE) 1000 MG tablet TAKE 1 TABLET (1,000 MG TOTAL) BY MOUTH 2 (TWO) TIMES A DAY WITH MEALS FOR DIABETES 180 tablet 3     omeprazole (PRILOSEC) 20 MG capsule Take 1 capsule (20 mg total) by mouth daily before breakfast. 30 capsule 11     pen needle, diabetic 32 gauge x 5/32\" Ndle Use 1 each As Directed " 3 (three) times a day. 100 each 1     prenatal vit-iron fum-folic ac (PRENATAL VITAMIN) 27 mg iron- 0.8 mg Tab tablet Take 1 tablet by mouth daily. 100 tablet 3     No facility-administered medications prior to visit.            Phone call duration: 19 minutes    Emile SUMNERP-C

## 2021-06-10 NOTE — PROGRESS NOTES
"Please see \"Imaging\" tab under Chart Review for full report.  This ultrasound was performed in the [facility], and may be located under Care Everywhere.    Kalyani Sierra MD  Maternal Fetal Medicine    "

## 2021-06-10 NOTE — PROGRESS NOTES
"Subjective:   Stacey Melendez is a 40 y.o.  at 30w5d who is seen for routine prenatal care.   Doing well.  Endorses normal fetal movement.  Denies headache, abdominal pain, nausea, lower extremity edema.    She is on insulin for gestational diabetes (reports 24U Lantus in am, 26U Lantus in PM, 12 Units Novolog with meals).  Her recent blood sugars from her log (last 8 days)  fasting 2-3h after breakfast After lunch 6pm   111 76  189   106      81  156    85  158    100 246 164    88 221 127    95 195     108 212       Doesn't miss any insulin doses.  Takes blood sugars 2-3 hours after eating or at certain times.    Records reviewed: last endo appt was  and was supposed to f/u in 1 week.  There was an 20 appt made, but no notes available so I'm not sure whether that appt happened.    The following portions of the patient's history were reviewed and updated as appropriate: allergies, current medications, and problem list   Objective   Vitals: /62   Pulse 92   Temp 98.6  F (37  C) (Oral)   Resp 24   Ht 4' 10.25\" (1.48 m)   Wt 148 lb 4 oz (67.2 kg)   LMP 2020 (Exact Date)   SpO2 97%   BMI 30.72 kg/m     Exam: Per flowsheet  Assessment/Plan:   40 y.o.  at 30w5d    1. Supervision of elderly multigravida in third trimester (>=35 years old at time of delivery)  Tdap given today.  Reviewed with her that u/s 20 at [facility] was normal.  Has next appt with [facility]  ([facility]?)    2. Pre-existing type 2 diabetes mellitus during pregnancy in third trimester  Blood sugars are not within goal range.  No future endo appts were scheduled, so had her meet with specialty scheduling today.  I asked her to try to check blood sugars 1 hr after starting meals instead of 2-3 hours later.        Return in about 15 days (around 2020) for Prenatal Care.   "

## 2021-06-10 NOTE — PROGRESS NOTES
"Stacey Melendez is a 40 y.o. female who is being evaluated via a billable telephone visit.      The patient has been notified of following:     \"This telephone visit will be conducted via a call between you and your physician/provider. We have found that certain health care needs can be provided without the need for a physical exam.  This service lets us provide the care you need with a short phone conversation.  If a prescription is necessary we can send it directly to your pharmacy.  If lab work is needed we can place an order for that and you can then stop by our lab to have the test done at a later time.    Telephone visits are billed at different rates depending on your insurance coverage. During this emergency period, for some insurers they may be billed the same as an in-person visit.  Please reach out to your insurance provider with any questions.    If during the course of the call the physician/provider feels a telephone visit is not appropriate, you will not be charged for this service.\"    Patient has given verbal consent to a Telephone visit? Yes    What phone number would you like to be contacted at? [phone]    Patient would like to receive their AVS by AVS Preference: Mail a copy.    Additional provider notes:       Reason for visit      1. Pre-existing type 2 diabetes mellitus during pregnancy, antepartum        HPI     Stacey Melendez is a very pleasant 39 y.o. old female who presents for management of Diabetes Mellitus during pregnancy.  She is currently 31w1d  weeks pregnant . Due date is 10/14/2020    Current carbohydrate intake:consistent with recommendations of 30g-60g-60g.  I have reviewed her blood glucose logs and note that the:  Fasting readings  are:above range on current regimen  Postprandial readings are:above range on current regimen  Current Lantus dose: 24 AM  26 PM  Current Prandial insulin: 12  Blood glucose logs/meter brought in and data reviewed and incorporated into " "decision-making.  Planned delivery at: Unknown  OBGYN: Christina  Therapy/Interventions in the past:  She has been seen by the Diabetes Educator- and has received instruction on carbohydrate counting and  consistency.  Records from referring provider and other sources have also been reviewed and incorporated into decision-making.      TODAY:    [facility] is contacted today in f/u for management of DM 2 during pregnancy. We are joined by SOTERO Caraballo and a professional . This is a very unsatisfactory call. The  is in her car. The pt is in HER car with a , and they are often talking and pt does not listen to what the  is telling her. Pt is for some reason, now testing BEFORE dinner. We are unsure why this is happening, because pt has been instructed to test 1 hour AFTER eating a meal. Her readings all remain high, with the exception of one FBS in the 80s. Pt does not know why this occurred. We will increase her insulin dosages.  Baby is moving appropriatley and she is not having any swelling. She reports that OB feels that things are \"OK\".     Blood Sugar Readings    8-6 Thursday  F 85  B 210  Before D 158    8-7 Friday  F 100  B 246  Before D 164    8-8 Saturday  F 88  B 221  Before D 127    8-9 Sunday  F 95  B 195  Before D 161    8-10 Monday  F 108  B 121  Before D 149    8-11 Tuesday  F 123  B 195  Before D 88    8-12 Wednesday  F 85  B 217  Before D 156    8-13 Thursday   F 92  B 196      Past Medical History       Patient Active Problem List   Diagnosis     Bilateral carpal tunnel syndrome     Gastroesophageal reflux disease with esophagitis     Learning problem     Diabetes (H)     Hyperlipidemia LDL goal <100     High-risk pregnancy, elderly multigravida, unspecified trimester     Pre-existing type 2 diabetes mellitus during pregnancy, antepartum        Past Surgical History     No past surgical history on file.    Family History     Family History   Problem Relation Age " of Onset     Hyperlipidemia Mother      Diabetes Mother      Cancer Neg Hx      Breast cancer Neg Hx      Hypertension Neg Hx        Social History     Social History     Tobacco Use     Smoking status: Never Smoker     Smokeless tobacco: Former User     Types: Chew     Tobacco comment: chews betel nut NO Tobacco   Substance Use Topics     Alcohol use: No     Drug use: No       Review of Systems     Patient has no polyuria or polydipsia, no chest pain, dyspnea or TIA's, no numbness, tingling or pain in extremities  Remainder negative except as noted in HPI.      Vital Signs     LMP 2020 (Exact Date)   Wt Readings from Last 3 Encounters:   08/10/20 148 lb 4 oz (67.2 kg)   20 145 lb (65.8 kg)   07/10/20 144 lb (65.3 kg)           Assessment     1. Pre-existing type 2 diabetes mellitus during pregnancy, antepartum        Plan     1.  DIABETES in pregnancy -  Adjust dose as follows:     -Lantus insulin 28 AM, 28 PM. Increase by 2 units every 2 days to keep fasting blood glucose below 95mg/dL  -Novolog 18  units with breakfast     -Novolog 18 units with dinner  -Increase by 2 units every 2 days to keep 1 hour after meal blood glucose less than 140mg/dL     We reviewed glucose goals of fasting blood glucose <95 mg/dL and 1 hour post prandial blood glucose of <140 mg/dL.    Monitor blood sugar 4 times daily: Fasting  and 1 hour after each meal.  Contact  this clinic [phone] if blood glucose is not within the above-mentioned goals.      We discussed the importance of excellent glycemic control during pregnancy to limit complications such as fetal macrosomia, shoulder dystocia,  hypoglycemia and hyperbilirubinemia.  I have discussed the patient's increased risk of recurrent GDM and/or development of type 2 diabetes later in life.      F/u in 1 week               Lab Results     Hemoglobin A1c   Date Value Ref Range Status   2020 7.4 (H) 3.5 - 6.0 % Final   2020 8.2 (H) 3.5 - 6.0 % Final    11/06/2019 6.8 (H) 3.5 - 6.0 % Final   08/27/2019 8.7 (H) 3.5 - 6.0 % Final   05/09/2019 8.1 (H) 3.5 - 6.0 % Final     Creatinine   Date Value Ref Range Status   05/22/2020 0.62 0.60 - 1.10 mg/dL Final   04/12/2019 0.75 0.60 - 1.10 mg/dL Final   12/26/2018 0.73 0.60 - 1.10 mg/dL Final     Microalbumin, Random Urine   Date Value Ref Range Status   01/07/2020 2.24 (H) 0.00 - 1.99 mg/dL Final       Cholesterol   Date Value Ref Range Status   02/07/2020 182 <=199 mg/dL Final     HDL Cholesterol   Date Value Ref Range Status   02/07/2020 43 (L) >=50 mg/dL Final     LDL Calculated   Date Value Ref Range Status   02/07/2020 67 <=129 mg/dL Final     Triglycerides   Date Value Ref Range Status   02/07/2020 361 (H) <=149 mg/dL Final       Lab Results   Component Value Date    ALT 10 05/22/2020    AST 15 05/22/2020    ALKPHOS 56 12/26/2018    BILITOT 0.8 12/26/2018         Current Medications     Outpatient Medications Prior to Visit   Medication Sig Dispense Refill     acetaminophen (TYLENOL) 325 MG tablet Take 2 tablets (650 mg total) by mouth every 6 (six) hours as needed for pain. 100 tablet 1     aspirin 81 MG EC tablet Take 1 tablet (81 mg total) by mouth daily. 90 tablet 2     blood glucose test (CONTOUR TEST STRIPS) strips Use 1 each As Directed 4 (four) times a day. Dispense brand per patient's insurance at pharmacy discretion. 100 strip 6     calcium, as carbonate, (TUMS) 200 mg calcium (500 mg) chewable tablet Chew 1 tablet (200 mg total) daily. 100 tablet 3     generic lancets (FINGERSTIX LANCETS) Use 1 each As Directed 4 (four) times a day. Dispense brand per patient's insurance 100 each 6     insulin aspart U-100 (NOVOLOG FLEXPEN U-100 INSULIN) 100 unit/mL (3 mL) injection pen Take 4 units right before eating lunch and dinner.  Increase as directed, max dose of 50 units daily. 15 mL 3     insulin glargine (LANTUS SOLOSTAR U-100 INSULIN) 100 unit/mL (3 mL) pen Take twice daily as directed, up to 60 units a day 5  "adj dose pen 2     insulin lispro (HUMALOG KWIKPEN INSULIN) 100 unit/mL pen Take 4 units right before eating lunch and dinner.  Increase as directed, max dose of 50 units daily. 15 mL 0     metFORMIN (GLUCOPHAGE) 1000 MG tablet TAKE 1 TABLET (1,000 MG TOTAL) BY MOUTH 2 (TWO) TIMES A DAY WITH MEALS FOR DIABETES 180 tablet 3     omeprazole (PRILOSEC) 20 MG capsule Take 1 capsule (20 mg total) by mouth daily before breakfast. 30 capsule 11     pen needle, diabetic 32 gauge x 5/32\" Ndle Use 1 each As Directed 3 (three) times a day. 100 each 1     prenatal vit-iron fum-folic ac (PRENATAL VITAMIN) 27 mg iron- 0.8 mg Tab tablet Take 1 tablet by mouth daily. 100 tablet 3     No facility-administered medications prior to visit.            Phone call duration: 20 minutes    Emile SUMNERP-C        "

## 2021-06-10 NOTE — PROGRESS NOTES
Clinic Care Coordination Contact  Community Health Worker Initial Outreach    CHW Initial Information Gathering:  Referral Source: PCP  Preferred Hospital: [facility]  [phone]  Preferred Urgent Care: [facility], [phone]  Current living arrangement:: I live in a private home with family  Type of residence:: Private home - no stairs  Community Resources: None  Supplies used at home:: None  Equipment Currently Used at Home: none  Informal Support system:: Family  No PCP office visit in Past Year: No  Transportation means:: Medical transport  CHW Additional Questions  If ED/Hospital discharge, follow-up appointment scheduled as recommended?: N/A  MyChart active?: No  Patient agreeable to assistance with activating MyChart?: No    Patient accepts CC: Yes. Patient scheduled for assessment with CCC EMELIA on 8/31/2020 at 2:00 PM. Patient noted desire to discuss pregnancy resources and car seat.

## 2021-06-10 NOTE — PROGRESS NOTES
[facility] GDM Care Plan      Assessment/Plan: spoke with Stacey via professional  via telephone.  Stated she is doing well today.  She met with her doctor this week and she is concerned about her blood sugars.  Stacey is currently taking Lantus 24 units in the morning and 26 units in the evening.  She is also taking Novolog 12 units before meals.  Blood Sugar Readings     8-6 Thursday  F 85  B 210  Before D 158     8-7 Friday  F 100  B 246  Before D 164     8-8 Saturday  F 88  B 221  Before D 127     8-9 Sunday  F 95  B 195  Before D 161     8-10 Monday  F 108  B 121  Before D 149     8-11 Tuesday  F 123  B 195  Before D 88     8-12 Wednesday  F 85  B 217  Before D 156     8-13 Thursday   F 92  B 196  Stated she always takes her insulin.  Asked why she is not checking glucose after dinner and per , patient does not feel like it.  She is checking 60-90 minutes after breakfast.  Discussed importance of checking glucose after dinner so we can get the right dose of insulin for her.  Increase Novolog to 18 units before each meal and Lantus 28 units twice daily.  Follow-up next week.    Visit Type: pregnancy clinic   Provider: Talha/Danna/Elvia  Provider's Diagnosis (per referral form):     Weight:    Lab Results   Component Value Date    HGBA1C 7.4 (H) 03/25/2020       Estimated Date of Delivery: 10/14/20   Pregnancy #: 6  Previous GDM: No   Medications:   Current Outpatient Medications:      acetaminophen (TYLENOL) 325 MG tablet, Take 2 tablets (650 mg total) by mouth every 6 (six) hours as needed for pain., Disp: 100 tablet, Rfl: 1     aspirin 81 MG EC tablet, Take 1 tablet (81 mg total) by mouth daily., Disp: 90 tablet, Rfl: 2     blood glucose test (CONTOUR TEST STRIPS) strips, Use 1 each As Directed 4 (four) times a day. Dispense brand per patient's insurance at pharmacy discretion., Disp: 100 strip, Rfl: 6     calcium, as carbonate, (TUMS) 200 mg calcium (500 mg) chewable tablet, Chew 1 tablet (200 mg  "total) daily., Disp: 100 tablet, Rfl: 3     generic lancets (FINGERSTIX LANCETS), Use 1 each As Directed 4 (four) times a day. Dispense brand per patient's insurance, Disp: 100 each, Rfl: 6     insulin aspart U-100 (NOVOLOG FLEXPEN U-100 INSULIN) 100 unit/mL (3 mL) injection pen, Take 4 units right before eating lunch and dinner.  Increase as directed, max dose of 50 units daily., Disp: 15 mL, Rfl: 3     insulin glargine (LANTUS SOLOSTAR U-100 INSULIN) 100 unit/mL (3 mL) pen, Take twice daily as directed, up to 60 units a day, Disp: 5 adj dose pen, Rfl: 2     insulin lispro (HUMALOG KWIKPEN INSULIN) 100 unit/mL pen, Take 4 units right before eating lunch and dinner.  Increase as directed, max dose of 50 units daily., Disp: 15 mL, Rfl: 0     metFORMIN (GLUCOPHAGE) 1000 MG tablet, TAKE 1 TABLET (1,000 MG TOTAL) BY MOUTH 2 (TWO) TIMES A DAY WITH MEALS FOR DIABETES, Disp: 180 tablet, Rfl: 3     omeprazole (PRILOSEC) 20 MG capsule, Take 1 capsule (20 mg total) by mouth daily before breakfast., Disp: 30 capsule, Rfl: 11     pen needle, diabetic 32 gauge x 5/32\" Ndle, Use 1 each As Directed 3 (three) times a day., Disp: 100 each, Rfl: 1     prenatal vit-iron fum-folic ac (PRENATAL VITAMIN) 27 mg iron- 0.8 mg Tab tablet, Take 1 tablet by mouth daily., Disp: 100 tablet, Rfl: 3      BG monitoring goals: Fasting <95; 1 hour post start of meal <140. Test 4 x per day.  Check fasting a.m. ketones: No  GDM meal pattern/carb counting taught per guidelines: Yes    Past Goals:  Nutrition: GDM meal plan -Unmet  Activity: Walking after meals when able/staying active -Met  Monitoring: BG 4x/day as directed, ketones every morning -Unmet      New Goals:  Nutrition: GDM meal plan   Activity: Walking after meals when able/staying active   Monitoring: BG 4x/day as directed, ketones every morning    DIABETES CARE PLAN AND EDUCATION RECORD    Gestational Diabetes Disease Process/Preconception Care/Management During " Pregnancy/Postpartum:Discussed    Meter (per above goals): Assessed and Discussed    Nutrition Management    Weight: Assessed and Discussed  Portions/Balance: Assessed and Discussed  Carb ID/Count: Assessed and Discussed  Label Reading: Assessed and Discussed  Menu Planning: Assessed and Discussed  Dining Out: Assessed and Discussed  Physical Activity: Assessed and Discussed    Acute Complications: Prevent, Detect, Treat:    Goal Setting and Problem Solving: Assessed and Discussed  Barriers: Assessed and Discussed  Psychosocial Adjustments: Assessed and Discussed    I agree with the aforementioned diabetes plan.  Betsy OBRIEN [facility], [facility]–1983 Endocrinology  8/13/2020  2:38 PM

## 2021-06-11 NOTE — PROGRESS NOTES
"Please see \"Imaging\" tab under Chart Review for full report.  This ultrasound was performed in the [facility], and may be located under Care Everywhere.    Denice Tijerina MD  Maternal Fetal Medicine    "

## 2021-06-11 NOTE — PROGRESS NOTES
".Please see \"Imaging\" tab under \"Chart Review\" for details of today's visit.    Bakari Curran        "

## 2021-06-11 NOTE — NURSING NOTE
Ipad  utilized for appointment   Faw Gaston    NST performed due to fetal tachycardia during 8/8 BPP.   reviewed efm tracing. See NST/BPP Doc Flowsheet tab.  NON REACTIVE with planned repeat in 24 hours,  utilized and patient agrees to come appointment on 9/9 @ 1400 and then go to primary OB/MD appointment at 1500.

## 2021-06-11 NOTE — PROGRESS NOTES
"Please see \"Imaging\" tab under Chart Review for full report.  This ultrasound was performed in the [facility], and may be located under Care Everywhere.    Kalyani Sierra MD  Maternal Fetal Medicine    "

## 2021-06-11 NOTE — PROGRESS NOTES
"SUBJECTIVE:  Stacey Melendez is a 40 y.o. female  at 35w0d by exact LMP c/w 1st tri US. Estimated Date of Delivery: 10/14/20.  She is doing well. She denies vaginal bleeding, leakage of fluid, or urinary symptoms. Fetal movement is present. She is not having contractions.  Concerns: had BPP today that was 8/8 with MFM. Taking 28 units lantus two times a day. Taking novolog 22 units with meals. She needs refill on novolog- has not had follow-up with endocrinology- message was sent to endocrinology team who will follow-up with patient. She did not bring in her glucose log book- cannot recall exact numbers. Reports fasting numbers are better. She usually only eats twice per day.  ROS  Negative except as noted above in HPI  OBJECTIVE:  Height 4' 10.25\" (1.48 m), weight 152 lb (68.9 kg), last menstrual period 2020, not currently breastfeeding.  Gen: Comfortable, no acute distress.  Abd: Gravid, non-tender  Ext: no edema  See OB Vitals flowsheet.  ASSESSMENT/PLAN:  Diagnoses and all orders for this visit:    High-risk pregnancy, elderly multigravida, unspecified trimester    Pre-existing type 2 diabetes mellitus during pregnancy, antepartum: Follows with diabetes education/endocrinology and MFM- continue twice weekly  surveillance- reviewed results from today. Continue weekly visits here in clinic. Growth US normal. No symptoms/signs of pre-eclampsia. Continue metformin, insulin lantus 28 units two times a day, novolog 22 units with meals- she needs follow-up with endocrinology- message was sent to team who will contact patient. Encouraged compliance with insulin and to write down blood sugars #'s- check four times a day. Reviewed hypoglycemia protocol for  after delivery and potential need for SCN/IV for hypoglycemia.      -IUP at 35w0d:   Prenatal labs reviewed.   AMA- progenity normal.  Fetal survey was done. Growth US through MFM normal for pre-existing diabetes.  Peripartum Anesthesia: the " patient does not desire an epidural during labor.   Post-partum Contraception: depo or pill   Breast/Bottle: breast and formula   Reviewed plans for getting to the hospital. Reviewed pre-eclampsia symptoms/signs. Reviewed hypoglycemia protocol for .  RTC in 1 weeks or sooner with problems. UOB each visit, GBS at next visit    Visit completed along with assistance of Wendy .    Dianne Vasquez MD

## 2021-06-11 NOTE — PROGRESS NOTES
"Please see the \"Imaging\" tab for details of today's ultrasound.    Barby Santos MD  Specialist in Maternal-Fetal Medicine    "

## 2021-06-11 NOTE — PROGRESS NOTES
"SUBJECTIVE:  Stacey Melendez is a 40 y.o. female  at 36w0d. Estimated Date of Delivery: 10/14/20.  She is doing well. She denies vaginal bleeding, leakage of fluid, or urinary symptoms. Fetal movement is present. She is not having contractions.  Concerns: Follows with twice weekly BPP. Lantus 32 units two times a day. 28 units with meals- novolog.   Fasting sugars- at goal.  Postprandials- sometimes at goal- sometimes elevated- 170-180's. She has recently titrated up her prandial insulin with endocrinology.  ROS  Negative except as noted above in HPI  OBJECTIVE:  Blood pressure 118/62, pulse 98, temperature 98.3  F (36.8  C), temperature source Oral, height 4' 10.5\" (1.486 m), weight 153 lb (69.4 kg), last menstrual period 2020, SpO2 93 %, not currently breastfeeding.  Gen: Comfortable, no acute distress.  Abd: Gravid, non-tender  See OB Vitals flowsheet  ASSESSMENT/PLAN:  Stacey was seen today for routine prenatal visit.    Diagnoses and all orders for this visit:    Pregnancy, unspecified gestational age  -     Urinalysis, OB Screen (ketones, glucose, protein only)  -     Group B Strep Screen by PCR    High-risk pregnancy, elderly multigravida, unspecified trimester  Pre-existing type 2 diabetes mellitus during pregnancy, antepartum: Follows with MFM and endocrinology. Growth US normal- BPP twice weekly. Having some elevated postprandials but increased prandial insulin and that has helped.       -IUP at 36w0d:   Prenatal labs reviewed .   AMA- progenity normal  GBS done today  Post-partum Contraception: depo or pill   Breast/Bottle: breast and formula  Reviewed plans for getting to the hospital.   Reviewed risks of hypoglycemia in the baby- possible need for SCN for hypoglycemia.   RTC in 1 weeks or sooner with problems. Give hospital form at next visit, UOB at next visit  Visit completed along with assistance of Wendy .    Dianne Vasquez MD    "

## 2021-06-11 NOTE — PROGRESS NOTES
"SUBJECTIVE:  Stacey Melendez is a 40 y.o. female  at 38w0d by exact LMP c/w 1st tri US. Estimated Date of Delivery: 10/14/20.  She is doing well. She denies vaginal bleeding, leakage of fluid, or urinary symptoms. Fetal movement is present. She is not having contractions.  Concerns: No concerns. She continues to have some elevated fasting and post-prandial readings- continues to work with endocrine/diabetes ed on insulin management.   ROS  Negative except as noted above in HPI  OBJECTIVE:  Blood pressure 106/72, pulse 92, temperature 98.7  F (37.1  C), temperature source Oral, height 4' 10.5\" (1.486 m), weight 155 lb (70.3 kg), last menstrual period 2020, SpO2 97 %, not currently breastfeeding.  Gen: Comfortable, no acute distress.  Abd: Gravid, non-tender  Ext: No edema  See OB Vitals flowsheet.  ASSESSMENT/PLAN:  Stacey was seen today for routine prenatal visit.    Diagnoses and all orders for this visit:    High-risk pregnancy, elderly multigravida, unspecified trimester  -     Urinalysis, OB Screen  -     Asymptomatic COVID-19 Virus (CORONAVIRUS) PCR; Future    Pre-existing type 2 diabetes mellitus during pregnancy, antepartum  -     Urinalysis, OB Screen  -     Asymptomatic COVID-19 Virus (CORONAVIRUS) PCR; Future  -     pen needle, diabetic 32 gauge x 5/32\" Ndle; Use 1 each As Directed 3 (three) times a day.      -IUP at 38w0d:   Prenatal labs reviewed   AMA- progenity normal  Fetal survey was done. Growth US normal.  GBS status is negative.   Peripartum Anesthesia: the patient does not desire an epidural during labor.   Post-partum Contraception: depo or pill   Breast/Bottle: breast and formula   Reviewed plans for getting to the hospital.  Due to pre-gestational insulin dependent diabetes with continued elevated postprandial readings despite insulin management with endocrinology/diabetes education, advanced maternal age- plan for induction by 39 weeks. Scheduled on 10/5/20 AM 7:30. Cervix favorable- " plan for pitocin induction. Discussed Covid-19 testing with patient prior to induction- placed order- patient to be called and do drive-thru testing- her  can drive her.    Visit completed along with assistance of Wendy magallon.    Dianne Vasquez MD

## 2021-06-11 NOTE — PROGRESS NOTES
"Stacey Melendez is a 40 y.o. female who is being evaluated via a billable telephone visit.      The patient has been notified of following:     \"This telephone visit will be conducted via a call between you and your physician/provider. We have found that certain health care needs can be provided without the need for a physical exam.  This service lets us provide the care you need with a short phone conversation.  If a prescription is necessary we can send it directly to your pharmacy.  If lab work is needed we can place an order for that and you can then stop by our lab to have the test done at a later time.    Telephone visits are billed at different rates depending on your insurance coverage. During this emergency period, for some insurers they may be billed the same as an in-person visit.  Please reach out to your insurance provider with any questions.    If during the course of the call the physician/provider feels a telephone visit is not appropriate, you will not be charged for this service.\"    Patient has given verbal consent to a Telephone visit? Yes    What phone number would you like to be contacted at? [phone]    Patient would like to receive their AVS by AVS Preference: Juliocesar.    Additional provider notes:       Reason for visit      1. Pre-existing type 2 diabetes mellitus during pregnancy, antepartum        HPI     Stacey Melendez is a very pleasant 39 y.o. old female who presents for management of Diabetes Mellitus during pregnancy.  She is currently 32w1d  weeks pregnant . Due date is 10/14/2020    Current carbohydrate intake:consistent with recommendations of 30g-60g-60g.  I have reviewed her blood glucose logs and note that the:  Fasting readings  are:above range on current regimen  Postprandial readings are:above range on current regimen  Current Lantus dose: 28 AM  28 PM  Current Prandial insulin:   Blood glucose logs/meter brought in and data reviewed and incorporated into " "decision-making.  Planned delivery at: Unknown  OBGYN: [facility]  Therapy/Interventions in the past:  She has been seen by the Diabetes Educator- and has received instruction on carbohydrate counting and  consistency.  Records from referring provider and other sources have also been reviewed and incorporated into decision-making.      TODAY:    [facility] is contacted today in f/u for management of DM 2 during pregnancy. We are joined by SOTERO Caraballo and a professional . She has provided BG for us today and her numbers remain elevated. She reports that she has not been testing after dinner because \"she knows that the numbers are going to be high\".  Encouraged to test anyway, so we can more accurately dose her insulin. We will increase her morning Lantus dose, and her prandial doses today. Baby is moving appropriately and she is having no swelling.     Blood Sugar Readings    9-3 Thursday  F 91  B 200    9-4 Friday  F 81  B 228    9-5 Saturday  F 95  B 233    9-6 Sunday  F 80  B 200    9-7 Monday  F 85  B 184    9-8 Tuesday  F 106  B 226  D 225    9-9 Wednesday  F 104  B 262  D 197    9-10 Thursday   F 84  B 173      Past Medical History       Patient Active Problem List   Diagnosis     Bilateral carpal tunnel syndrome     Gastroesophageal reflux disease with esophagitis     Learning problem     Diabetes (H)     Hyperlipidemia LDL goal <100     High-risk pregnancy, elderly multigravida, unspecified trimester     Pre-existing type 2 diabetes mellitus during pregnancy, antepartum        Past Surgical History     No past surgical history on file.    Family History     Family History   Problem Relation Age of Onset     Hyperlipidemia Mother      Diabetes Mother      Cancer Neg Hx      Breast cancer Neg Hx      Hypertension Neg Hx        Social History     Social History     Tobacco Use     Smoking status: Never Smoker     Smokeless tobacco: Former User     Types: Chew     Tobacco comment: chews betel nut NO Tobacco "   Substance Use Topics     Alcohol use: No     Frequency: Never     Binge frequency: Never     Drug use: No       Review of Systems     Patient has no polyuria or polydipsia, no chest pain, dyspnea or TIA's, no numbness, tingling or pain in extremities  Remainder negative except as noted in HPI.      Vital Signs     LMP 2020 (Exact Date)   Wt Readings from Last 3 Encounters:   20 152 lb (68.9 kg)   20 149 lb (67.6 kg)   08/10/20 148 lb 4 oz (67.2 kg)           Assessment     1. Pre-existing type 2 diabetes mellitus during pregnancy, antepartum        Plan       1.  DIABETES in pregnancy -  Adjust dose as follows:     -Lantus insulin 32 AM, 28 PM. Increase by 2 units every 2 days to keep fasting blood glucose below 95mg/dL  -Novolog 28  units with breakfast     -Novolog 28 units with dinner  -Increase by 2 units every 2 days to keep 1 hour after meal blood glucose less than 140mg/dL     We reviewed glucose goals of fasting blood glucose <95 mg/dL and 1 hour post prandial blood glucose of <140 mg/dL.    Monitor blood sugar 4 times daily: Fasting  and 1 hour after each meal.  Contact  this clinic [phone] if blood glucose is not within the above-mentioned goals.      We discussed the importance of excellent glycemic control during pregnancy to limit complications such as fetal macrosomia, shoulder dystocia,  hypoglycemia and hyperbilirubinemia.  I have discussed the patient's increased risk of recurrent GDM and/or development of type 2 diabetes later in life.      F/u in 1 week             Lab Results     Hemoglobin A1c   Date Value Ref Range Status   2020 7.4 (H) 3.5 - 6.0 % Final   2020 8.2 (H) 3.5 - 6.0 % Final   2019 6.8 (H) 3.5 - 6.0 % Final   2019 8.7 (H) 3.5 - 6.0 % Final   2019 8.1 (H) 3.5 - 6.0 % Final     Creatinine   Date Value Ref Range Status   2020 0.62 0.60 - 1.10 mg/dL Final   2019 0.75 0.60 - 1.10 mg/dL Final   2018 0.73  0.60 - 1.10 mg/dL Final     Microalbumin, Random Urine   Date Value Ref Range Status   01/07/2020 2.24 (H) 0.00 - 1.99 mg/dL Final       Cholesterol   Date Value Ref Range Status   02/07/2020 182 <=199 mg/dL Final     HDL Cholesterol   Date Value Ref Range Status   02/07/2020 43 (L) >=50 mg/dL Final     LDL Calculated   Date Value Ref Range Status   02/07/2020 67 <=129 mg/dL Final     Triglycerides   Date Value Ref Range Status   02/07/2020 361 (H) <=149 mg/dL Final       Lab Results   Component Value Date    ALT 10 05/22/2020    AST 15 05/22/2020    ALKPHOS 56 12/26/2018    BILITOT 0.8 12/26/2018         Current Medications     Outpatient Medications Prior to Visit   Medication Sig Dispense Refill     acetaminophen (TYLENOL) 325 MG tablet Take 2 tablets (650 mg total) by mouth every 6 (six) hours as needed for pain. 100 tablet 1     aspirin 81 MG EC tablet Take 1 tablet (81 mg total) by mouth daily. 90 tablet 2     blood glucose test (CONTOUR TEST STRIPS) strips Use 1 each As Directed 4 (four) times a day. Dispense brand per patient's insurance at pharmacy discretion. 100 strip 6     calcium, as carbonate, (TUMS) 200 mg calcium (500 mg) chewable tablet Chew 1 tablet (200 mg total) daily. 100 tablet 3     generic lancets (FINGERSTIX LANCETS) Use 1 each As Directed 4 (four) times a day. Dispense brand per patient's insurance 100 each 6     insulin aspart U-100 (NOVOLOG FLEXPEN U-100 INSULIN) 100 unit/mL (3 mL) injection pen Take 4 units right before eating lunch and dinner.  Increase as directed, max dose of 50 units daily. 15 mL 3     insulin glargine (LANTUS SOLOSTAR U-100 INSULIN) 100 unit/mL (3 mL) pen Take twice daily as directed, up to 60 units a day 15 mL 2     insulin lispro (HUMALOG KWIKPEN INSULIN) 100 unit/mL pen Take 4 units right before eating lunch and dinner.  Increase as directed, max dose of 50 units daily. 15 mL 0     metFORMIN (GLUCOPHAGE) 1000 MG tablet TAKE 1 TABLET (1,000 MG TOTAL) BY MOUTH 2  "(TWO) TIMES A DAY WITH MEALS FOR DIABETES 180 tablet 3     omeprazole (PRILOSEC) 20 MG capsule Take 1 capsule (20 mg total) by mouth daily before breakfast. 30 capsule 11     pen needle, diabetic 32 gauge x 5/32\" Ndle Use 1 each As Directed 3 (three) times a day. 100 each 1     prenatal vit-iron fum-folic ac (PRENATAL VITAMIN) 27 mg iron- 0.8 mg Tab tablet Take 1 tablet by mouth daily. 100 tablet 3     No facility-administered medications prior to visit.            Phone call duration: 19 minutes    Emile SUMNERP-C      "

## 2021-06-11 NOTE — PROGRESS NOTES
"Please see \"Imaging\" tab under Chart Review for full report.  This ultrasound was performed in the [facility], and may be located under Care Everywhere.    Denice Tijerina MD  Maternal Fetal Medicine    "

## 2021-06-11 NOTE — PROGRESS NOTES
"Stacey Melendez is a 40 y.o. female who is being evaluated via a billable telephone visit.      The patient has been notified of following:     \"This telephone visit will be conducted via a call between you and your physician/provider. We have found that certain health care needs can be provided without the need for a physical exam.  This service lets us provide the care you need with a short phone conversation.  If a prescription is necessary we can send it directly to your pharmacy.  If lab work is needed we can place an order for that and you can then stop by our lab to have the test done at a later time.    Telephone visits are billed at different rates depending on your insurance coverage. During this emergency period, for some insurers they may be billed the same as an in-person visit.  Please reach out to your insurance provider with any questions.    If during the course of the call the physician/provider feels a telephone visit is not appropriate, you will not be charged for this service.\"    Patient has given verbal consent to a Telephone visit? Yes    What phone number would you like to be contacted at? [phone]    Patient would like to receive their AVS by AVS Preference: Mail a copy.    Additional provider notes:     [facility]  ENDOCRINOLOGY    Gestational Diabetes 2020    Stacey Melendez, 1980, [id number]          Reason for visit      1. Pre-existing type 2 diabetes mellitus during pregnancy, antepartum        HPI      Stacey Melendez is a very pleasant 39 y.o. old female who presents for management of Diabetes Mellitus during pregnancy.  She is currently 37w1d  weeks pregnant . Due date is 10/14/2020    Current carbohydrate intake:consistent with recommendations of 30g-60g-60g.  I have reviewed her blood glucose logs and note that the:  Fasting readings  are:above range on current regimen  Postprandial readings are:above range on current regimen  Current Lantus dose: 28 AM  32 PM  Current " Prandial insulin: 28/28  Blood glucose logs/meter brought in and data reviewed and incorporated into decision-making.  Planned delivery at: Unknown  OBGYN: [facility]    Therapy/Interventions in the past:  She has been seen by the Diabetes Educator- and has received instruction on carbohydrate counting and  consistency.  Records from referring provider and other sources have also been reviewed and incorporated into decision-making.      TODAY:    [facility] is contacted today in f/u for DM 2 management in Pregnancy. We are joined by SOTERO Caraballo and a professional . [facility] has provided BG for us today and most noticeable are the lower numbers in the morning. She is concerned about them, so we will lower her HS dose by 2 units. Her PP readings continue to be high, and we will increase to 32 with meals. She reports an induction at 39 weeks, and no current concerns from her OB. Post partum instructions given today, including stopping the insulin after birth and continuing the Metformin that she has been taking throughout the pregnancy. All questions answered to her satisfaction.     Blood Sugar Readings    9-17 Thursday  F 83  L 158  D --    9-18 Friday  F 88  L 173  D --    9-19 Saturday  F 95  L 164  D 176    9-20 Sunday  F 86  L 123  Before D 44  D 183    9-21 Monday  F 104  L 167  D 184    9-22 Tuesday  F 104  L 174  D 169    9-23 Wednesday   F 84  L 275  Before 79  D 146    9-24 Thursday   F 83   L144      Past Medical History       Patient Active Problem List   Diagnosis     Bilateral carpal tunnel syndrome     Gastroesophageal reflux disease with esophagitis     Learning problem     Diabetes (H)     Hyperlipidemia LDL goal <100     High-risk pregnancy, elderly multigravida, unspecified trimester     Pre-existing type 2 diabetes mellitus during pregnancy, antepartum        Past Surgical History     No past surgical history on file.    Family History     Family History   Problem Relation Age of Onset      Hyperlipidemia Mother      Diabetes Mother      Cancer Neg Hx      Breast cancer Neg Hx      Hypertension Neg Hx        Social History     Social History     Tobacco Use     Smoking status: Never Smoker     Smokeless tobacco: Former User     Types: Chew     Tobacco comment: chews betel nut NO Tobacco   Substance Use Topics     Alcohol use: No     Frequency: Never     Binge frequency: Never     Drug use: No       Review of Systems     Patient has no polyuria or polydipsia, no chest pain, dyspnea or TIA's, no numbness, tingling or pain in extremities  Remainder negative except as noted in HPI.      Vital Signs     LMP 2020 (Exact Date)   Wt Readings from Last 3 Encounters:   20 153 lb (69.4 kg)   20 153 lb (69.4 kg)   20 152 lb (68.9 kg)             Assessment     1. Pre-existing type 2 diabetes mellitus during pregnancy, antepartum        Plan       1.  DIABETES in pregnancy -  Adjust dose as follows:     -Lantus insulin 28 AM, 30 PM. Increase by 2 units every 2 days to keep fasting blood glucose below 95mg/dL  -Novolog 32  units with breakfast     -Novolog 32 units with dinner  -Increase by 2 units every 2 days to keep 1 hour after meal blood glucose less than 140mg/dL     We reviewed glucose goals of fasting blood glucose <95 mg/dL and 1 hour post prandial blood glucose of <140 mg/dL.    Monitor blood sugar 4 times daily: Fasting  and 1 hour after each meal.  Contact  this clinic [phone] if blood glucose is not within the above-mentioned goals.      We discussed the importance of excellent glycemic control during pregnancy to limit complications such as fetal macrosomia, shoulder dystocia,  hypoglycemia and hyperbilirubinemia.  I have discussed the patient's increased risk of recurrent GDM and/or development of type 2 diabetes later in life.        F/up with A1c 3 months postpartum with PCP.    May be a candidate for metformin postpartum as she has more than 1 risk factor for  future Type 2 Diabetes Mellitus.    She will need a screening A1c at least annually, TLC- including carbohydrate control and exercise.    After you deliver the baby, it is suggested to check your blood sugar occasionally (1 - 2 times per week) for 6 weeks.     When you are not pregnant, normal blood sugars are:       Fasting (before eating anything in the morning)  - under 100 mg/dl    2 hours after meals under 140  The American Diabetes Association recommends:     a glucose tolerance test 6 weeks after you deliver the baby.         a hemoglobin Alc test yearly after delivery.  The Alc test is a 2-3 month average blood sugar reading.        Discuss getting these tests with your provider.       After delivery, and your provider has said that it is safe to be active, work toward getting 150 minutes each week of physical activity to decrease your risk of  getting type 2 diabetes.       Maintaining a healthy body weight will also decrease your risk of getting type 2 diabetes.             Lab Results     Hemoglobin A1c   Date Value Ref Range Status   03/25/2020 7.4 (H) 3.5 - 6.0 % Final   02/07/2020 8.2 (H) 3.5 - 6.0 % Final   11/06/2019 6.8 (H) 3.5 - 6.0 % Final   08/27/2019 8.7 (H) 3.5 - 6.0 % Final   05/09/2019 8.1 (H) 3.5 - 6.0 % Final     Creatinine   Date Value Ref Range Status   05/22/2020 0.62 0.60 - 1.10 mg/dL Final   04/12/2019 0.75 0.60 - 1.10 mg/dL Final   12/26/2018 0.73 0.60 - 1.10 mg/dL Final     Microalbumin, Random Urine   Date Value Ref Range Status   01/07/2020 2.24 (H) 0.00 - 1.99 mg/dL Final       Cholesterol   Date Value Ref Range Status   02/07/2020 182 <=199 mg/dL Final     HDL Cholesterol   Date Value Ref Range Status   02/07/2020 43 (L) >=50 mg/dL Final     LDL Calculated   Date Value Ref Range Status   02/07/2020 67 <=129 mg/dL Final     Triglycerides   Date Value Ref Range Status   02/07/2020 361 (H) <=149 mg/dL Final       Lab Results   Component Value Date    ALT 10 05/22/2020    AST 15  "05/22/2020    ALKPHOS 56 12/26/2018    BILITOT 0.8 12/26/2018         Current Medications     Outpatient Medications Prior to Visit   Medication Sig Dispense Refill     acetaminophen (TYLENOL) 325 MG tablet Take 2 tablets (650 mg total) by mouth every 6 (six) hours as needed for pain. 100 tablet 1     aspirin 81 MG EC tablet Take 1 tablet (81 mg total) by mouth daily. 90 tablet 2     blood glucose test (CONTOUR TEST STRIPS) strips Use 1 each As Directed 4 (four) times a day. Dispense brand per patient's insurance at pharmacy discretion. 100 strip 6     calcium, as carbonate, (TUMS) 200 mg calcium (500 mg) chewable tablet Chew 1 tablet (200 mg total) daily. 100 tablet 3     generic lancets (FINGERSTIX LANCETS) Use 1 each As Directed 4 (four) times a day. Dispense brand per patient's insurance 100 each 6     insulin aspart U-100 (NOVOLOG FLEXPEN U-100 INSULIN) 100 unit/mL (3 mL) injection pen Take 4 units right before eating lunch and dinner.  Increase as directed, max dose of 50 units daily. 15 mL 3     insulin glargine (LANTUS SOLOSTAR U-100 INSULIN) 100 unit/mL (3 mL) pen Take twice daily as directed, up to 60 units a day 15 mL 2     insulin lispro (HUMALOG KWIKPEN INSULIN) 100 unit/mL pen Take 4 units right before eating lunch and dinner.  Increase as directed, max dose of 50 units daily. 15 mL 0     metFORMIN (GLUCOPHAGE) 1000 MG tablet TAKE 1 TABLET (1,000 MG TOTAL) BY MOUTH 2 (TWO) TIMES A DAY WITH MEALS FOR DIABETES 180 tablet 3     pen needle, diabetic 32 gauge x 5/32\" Ndle Use 1 each As Directed 3 (three) times a day. 100 each 1     prenatal vit-iron fum-folic ac (PRENATAL VITAMIN) 27 mg iron- 0.8 mg Tab tablet Take 1 tablet by mouth daily. 100 tablet 3     No facility-administered medications prior to visit.            Phone call duration: 20 minutes    Emile SUMNERP-C      "

## 2021-06-11 NOTE — PROGRESS NOTES
"SUBJECTIVE:  Stacey Melendez is a 40 y.o. female  at 37w0d by exact LMP c/w 1st tri US. Estimated Date of Delivery: 10/14/20.  She is doing well. She denies vaginal bleeding, leakage of fluid, or urinary symptoms. Fetal movement is present. She is not having contractions.  Concerns: Continues with twice weekly BPP's. Has endocrinology appointment tomorrow. She continues taking metformin, ASA, insulin.   ROS  Negative except as noted above in HPI  OBJECTIVE:  Blood pressure 102/60, pulse 99, temperature 98.3  F (36.8  C), temperature source Oral, height 4' 10.5\" (1.486 m), weight 153 lb (69.4 kg), last menstrual period 2020, SpO2 97 %, not currently breastfeeding.  Gen: Comfortable, no acute distress.  Abd: Gravid, non-tender  Ext: no edema  See OB Vitals flowsheet.  ASSESSMENT/PLAN:  Stacey was seen today for routine prenatal visit.    Diagnoses and all orders for this visit:    Type 2 diabetes mellitus without complication, without long-term current use of insulin (H)  -     Urinalysis, OB Screen      -IUP at 37w0d:   Prenatal labs reviewed   AMA- progenity normal  Fetal survey was done. Growth US through MFM- normal  GBS status is negative- reviewed with patient today.   Peripartum Anesthesia: the patient does not desire an epidural during labor.   Post-partum Contraception: depo or pill   Breast/Bottle: breast and formula   Reviewed plans for getting to the hospital. Gave hospital form  RTC in 1 weeks or sooner with problems. Check cervix at next visit, discuss induction by 39 weeks.    Visit completed along with assistance of Wendy .    Dianne Vasquez MD    "

## 2021-06-11 NOTE — PROGRESS NOTES
"Please see \"Imaging\" tab under Chart Review for full report.  This ultrasound was performed in the [facility], and may be located under Care Everywhere.    Kalyani Sierra MD  Maternal Fetal Medicine    "

## 2021-06-11 NOTE — PROGRESS NOTES
"Please see \"Imaging\" tab under Chart Review for full report.  This ultrasound was performed in the [facility], and may be located under Care Everywhere.    Denice Tijerina MD  Maternal Fetal Medicine    "

## 2021-06-11 NOTE — PROGRESS NOTES
Clinic Care Coordination Contact    Community Health Worker Follow Up    Goals:     Goals Addressed                 This Visit's Progress       Patient Stated      Financial Wellbeing (pt-stated)   0%     Goal statement: I want to add my baby to health insurance and resolve any outstanding account balance within 3 months of my baby's birth.    Date goal set: 8/31/20  Barriers: Language barrier, high risk pregnancy  Strengths: Appears willing to accept support, has reliable source of transportation (self), good familial support by older children and spouse  Date to achieve by: 12/31/20  Patient expressed understanding of goal: Yes    Action steps to achieve this goal:  1.  I will work with the inpatient financial worker during/following hospitalization   2.  I will contact [facility] with the support of FRW and provide any/all information necessary to add my baby to health insurance.  3.  I will communicate with CHW at outreach.         COMPLETED: Healthy Eating (pt-stated)   100%     Goal statement: I would like to apply for benefits through the [facility] program within the next 30 days.     Date goal set: 8/31/20  Barriers: Language barrier, high risk pregnancy  Strengths: Appears willing to accept support, has reliable source of transportation (self), good familial support by older children and spouse  Date to achieve by: 10/1/20  Patient expressed understanding of goal: Yes     Personal Plan:  1.  I am now connected with the [facility] Program at [phone] and will continue to work with them.          Other (pt-stated)   80%     Goal statement: I would like assistance obtaining a carseat through [employer] within the next 60 days.    Date goal set: 8/31/20  Barriers: Language barrier, high risk pregnancy  Strengths: Appears willing to accept support, has reliable source of transportation (self), good familial support by older children and spouse  Date to achieve by:  10/31/20  Patient expressed understanding of goal: Yes    Action steps to achieve this goal:  1.  I will be ready to receive my car seat delivery from [employer] in the next 2 weeks.                CHW Outreach Conversation:    Stacey Melendez reports she has an appointment today at 1:20pm and she reports that she is set up with [facility] now. She states that she would like a referral to the FRW post delivery for help with adding her  to insurance. She states that she has been in contact with [employer] and they plan to deliver the car seat soon.      CHW Next Follow-up: 10/23/2020    CHW Plan: Outreach as scheduled and send FRW Remind Me message regarding adding baby to insurance.

## 2021-06-11 NOTE — TELEPHONE ENCOUNTER
glipiZIDE (GLUCOTROL XL) 10 MG 24 hr tablet [[id number]]     Electronically signed by: Kasey Markham, PharmD on 02/07/20 1430  Status: Discontinued    Ordering user: Kasey Markham, PharmD 02/07/20 1430  Ordering provider: Kasey Markham, PharmD    Authorized by: Carlos Noel MD    Frequency: BID with meals 02/07/20 - 04/24/20  Discontinued by: Kandis Tucker MD 04/24/20 1333 [Therapy completed]    Diagnoses

## 2021-06-11 NOTE — PROGRESS NOTES
"Please see \"Imaging\" tab under Chart Review for full report.  This ultrasound was performed in the [facility], and may be located under Care Everywhere.    Denice Tijerina MD  Maternal Fetal Medicine    "

## 2021-06-11 NOTE — PROGRESS NOTES
"Please see \"Imaging\" tab under Chart Review for full report.  This ultrasound was performed in the [facility], and may be located under Care Everywhere.    Kalyani Sierra MD  Maternal Fetal Medicine    "

## 2021-06-11 NOTE — PROGRESS NOTES
"Please see \"Imaging\" tab under Chart Review for full report.  This ultrasound was performed in the [facility], and may be located under Care Everywhere.    Kalyani Sierra MD  Maternal Fetal Medicine    "

## 2021-06-11 NOTE — PROGRESS NOTES
NST performed due to fetal tachycardia noted during ultrasound.  Dr. Tijerina reviewed efm tracing. See NST/BPP Doc Flowsheet tab.

## 2021-06-12 NOTE — PROGRESS NOTES
Assessment/Plan:     Stacey was seen today for postpartum care.    Diagnoses and all orders for this visit:    Routine postpartum follow-up  -     prenatal vit-iron fum-folic ac (PRENATAL VITAMIN) 27 mg iron- 0.8 mg Tab tablet; Take 1 tablet by mouth daily.    Type 2 diabetes mellitus without complication, without long-term current use of insulin (H): Had postpartum visit with PCP for diabetes follow-up already- A1C done and 5.6. On metformin and ?glipizide- she left glipizide at home but she reports it is a small white tablet that she was on prior to pregnancy. Due for eye exam- sent to specialty- follow-up with PCP in 3-6 months for diabetes chek.      Anemia:  Normal antepartum hemoglobin with no excessive blood loss  Breastfeeding/Bottle feeding:  Pt is breast and formula feeding baby  Contraception:   Was thinking depo or pills- she would like to use pills.   Depression:   See [facility] Depresion survey  Exercise:   Encouraged increasing exercise based on how she is feeling.  Healthcare maintenance:   Up to date  Immunizations:    Immunizations up to date             Subjective:      Stacey Melendez is a 40 y.o. female who presents for a postpartum visit.   She is postpartum following a vaginal delivery.   I have fully reviewed the prenatal and intrapartum course.   Postpartum course has been Unremarkable.  Baby's course has been Uncomplicated.  Periods:  Have not had period yet. Patient's last menstrual period was 2020 (exact date).   Bowel or bladder concerns: denies  Patient has not resumed intercourse.    [place]  Depression Scale EPDS Total Score: 0 (2020 12:57 PM)    Taking metformin 1,000 mg two times a day   Also taking small white pill at home (?glipizide) 5 mg two times a day  Sugar fasting- 80-90    Last hgb on file:    Lab Results   Component Value Date    HGB 12.0 07/10/2020        The following portions of the patient's history were reviewed and updated as appropriate:  allergies, current medications and problem list     OB History    Para Term  AB Living   6 6 6     5   SAB TAB Ectopic Multiple Live Births         0 6      # Outcome Date GA Lbr Mitesh/2nd Weight Sex Delivery Anes PTL Lv   6 Term 10/05/20 38w5d 04:01 / 00:08 7 lb 3.7 oz (3.28 kg) M Vag-Spont OTHER N MARY ANN   5 Term    6 lb 9.8 oz (3 kg) F Vag-Spont   MARY ANN   4 Term    6 lb 9.8 oz (3 kg) F Vag-Spont   MARY ANN   3 Term    6 lb 9.8 oz (3 kg) M Vag-Spont   MARY ANN      Birth Comments: excessive bleeding      Complications: Hemorrhage   2 Term    6 lb 9.8 oz (3 kg) F Vag-Spont   MARY ANN   1 Term    6 lb 9.8 oz (3 kg) M Vag-Spont   DEC      Birth Comments: sick and  at age 4 in [facility] [place]     Information for the patient's :  Uriel Gold [[id number]]   Uriel Gold       Objective:      Vitals:    20 1255   BP: 102/62   Pulse: 90   Temp: 98.3  F (36.8  C)   SpO2: 97%       Physical Exam:  General Appearance: Alert, cooperative, no distress, appears stated age  Lungs: Clear to auscultation bilaterally, respirations unlabored  Heart: Regular rate and rhythm, S1 and S2 normal, no murmur, rub, or gallop  Abdomen: Soft, non-tender, no masses, no organomegaly  Pelvic:Normally developed genitalia with no external lesions or eruptions. Well healed perineum.  Psych: Normal mood and affect    Dianne Vasquez MD

## 2021-06-12 NOTE — PROGRESS NOTES
Clinic Care Coordination Contact  Program: Adding insurance for   [facility] Case #:  [facility] Worker:   Jason #:   PMI #:   Date Applied:       Outreach:   10/21/20: FRW called patient to discuss referral. Appointment made for 10/29 to call [facility] and added . FRW updated action steps in financial wellbeing and routed note to CC team for standard of work.       Health Insurance:      Referral/Screening:   Hi,     Eah Al just wants to make sure her baby gets added to insurance.     [place]: Uriel Gold - [id number]       Thank you,     Cherry    Goals Addressed:   Goals Addressed                 This Visit's Progress       Patient Stated      Financial Wellbeing (pt-stated)   50%     Goal statement: I want to add my baby to health insurance and resolve any outstanding account balance within 3 months of my baby's birth.    Date goal set: 20  Barriers: Language barrier, high risk pregnancy  Strengths: Appears willing to accept support, has reliable source of transportation (self), good familial support by older children and spouse  Date to achieve by: 20  Patient expressed understanding of goal: Yes    Action steps to achieve this goal:  1.  I will answer phone on 10/29 when FRW calls. We will call [facility] to add  to insurance.   2.  I will contact [facility] Jason with the support of FRW and provide any/all information necessary to add my baby to health insurance.  3.  I will communicate with CHW at outreach.

## 2021-06-12 NOTE — PROGRESS NOTES
Clinic Care Coordination Contact    Community Health Worker Follow Up    Goals:     Goals Addressed                 This Visit's Progress       Patient Stated      Financial Wellbeing (pt-stated)   10%     Goal statement: I want to add my baby to health insurance and resolve any outstanding account balance within 3 months of my baby's birth.    Date goal set: 20  Barriers: Language barrier, high risk pregnancy  Strengths: Appears willing to accept support, has reliable source of transportation (self), good familial support by older children and spouse  Date to achieve by: 20  Patient expressed understanding of goal: Yes    Action steps to achieve this goal:  1.  I will work with the inpatient financial worker during/following hospitalization   2.  I will contact [facility] with the support of FRW and provide any/all information necessary to add my baby to health insurance.  3.  I will communicate with CHW at outreach.         COMPLETED: Other (pt-stated)   100%     Goal statement: I would like assistance obtaining a carseat through [employer] within the next 60 days.    Date goal set: 20  Barriers: Language barrier, high risk pregnancy  Strengths: Appears willing to accept support, has reliable source of transportation (self), good familial support by older children and spouse  Date to achieve by: 10/31/20  Patient expressed understanding of goal: Yes    Personal Plan:  1. I have all needed baby items including a car seat.                CHW Outreach Conversation:    Stacey Melendez is not sure if her baby has been added to insurance but she would like clarification and help with contacting [facility] [employer] or the [facility] to find out.    She reports she is doing well and understands that her  son has a PCP appointment on 10/30 at 11am and hers is the same day at 10:45am.         CHW Next Follow-up: 2020    CHW Plan: CHW sending an FRW Remind Me message today and will review FRW notes at  next scheduled outreach

## 2021-06-12 NOTE — PROGRESS NOTES
Assessment: /    Plan:    1. Gastroesophageal reflux disease without esophagitis  ranitidine (ZANTAC) 150 MG tablet   2. Birth control counseling  levonorgestrel-ethinyl estradiol (ORSYTHIA) 0.1-20 mg-mcg per tablet       Recheck if not improving.  Patient was seen with Wendy , Serjio Pope.      Subjective:    HPI:  Stacey Melendez is a 37-year-old female presenting with reflux symptoms.  She is treated for H. pylori last year.  She occasionally has diarrhea after eating.       Review of Systems: No vomiting or melena.      Current Outpatient Prescriptions   Medication Sig Dispense Refill     levonorgestrel-ethinyl estradiol (ORSYTHIA) 0.1-20 mg-mcg per tablet Take 1 tablet by mouth daily. 84 tablet 3     ranitidine (ZANTAC) 150 MG tablet Take 1 tablet (150 mg total) by mouth 2 (two) times a day. 60 tablet 11     No current facility-administered medications for this visit.          Objective:    Vitals:    08/11/17 1017   BP: 116/78   Pulse: 71   Resp: 16   Temp: 98.4  F (36.9  C)   SpO2: 98%       Gen:  NAD, VSS  Lungs:  normal  Heart:  normal  Abdomen:  No HSM, mass or tenderness        ADDITIONAL HISTORY SUMMARIZED (2): Reviewed September 2016 note by Dr. Noel.  DECISION TO OBTAIN EXTRA INFORMATION (1): None.   RADIOLOGY TESTS (1): None.  LABS (1): None.  MEDICINE TESTS (1): None.  INDEPENDENT REVIEW (2 each): None.     Total Data Points: 2

## 2021-06-12 NOTE — PROGRESS NOTES
Clinic Care Coordination Contact    Situation: 45 day chart review completed by SW care coordinator.    Background:   - Most recent SW assessment completed on 20.  - Pt initially enrolled for follow up on baby resources, carseat referral, and assistance adding  to health insurance after birth.  - Pt was 33w5d pregnant at time of enrollment.   - Pt gave birth to  baby boy on 10/5/20.  - See FRW notes in chart for further detail regarding progress towards completion of insurance goal as it pertains to adding  to her case.  - Last outreach by CHW on 10/20/20. During this conversation, no new goals or concerns were identified. Pt understands that she must continue working and communicating with FRW to add  to health insurance.  - Pt and  were seen for office visits with PCP/OB provider on 10/30/20.    Assessment: All goals reviewed during this encounter. [facility] SW will continue monitoring goal(s) and progress made towards goal completion. Chart review to be completed by SW every 45 days during continued course of CCC enrollment.     Plan/Recommendations:   1.) Continue scheduled outreach.

## 2021-06-12 NOTE — PROGRESS NOTES
Clinic Care Coordination Contact  Program: Adding insurance for   [facility] Case #:  [employer] Worker:   Jason #:   PMI #:   Date Applied: 10/29/20      Outreach:   20: FRW called [employer] and spoke with Mitzi.  has been added to case.FRW called patient's mother to confirm as MA isn't showing MA is active with PMI #[id number]. FRW will call mother again in 1 week.   10/29/20: FRW and patient called [facility] to add  to family case. [place] answered and added  to case.  will be added and active in 2-4 weeks. Family will get insurance paperwork in mail and FRW will call patient in 3 weeks. FRW updated action steps in financial wellbeing goals and routed note to CC team for standard of work.   10/21/20: FRW called patient to discuss referral. Appointment made for 10/29 to call [facility] and added . FRW updated action steps in financial wellbeing and routed note to CC team for standard of work.       Health Insurance:      Referral/Screening:   Hi,     Stacey Paw just wants to make sure her baby gets added to insurance.     [place]: Uriel Gold - [id number]       Thank you,     Cherry    Goals Addressed:   Goals Addressed                 This Visit's Progress       Patient Stated      Financial Wellbeing (pt-stated)   80%     Goal statement: I want to add my baby to health insurance and resolve any outstanding account balance within 3 months of my baby's birth.    Date goal set: 20  Barriers: Language barrier, high risk pregnancy  Strengths: Appears willing to accept support, has reliable source of transportation (self), good familial support by older children and spouse  Date to achieve by: 20  Patient expressed understanding of goal: Yes    Action steps to achieve this goal:  1.  I will contacted [facility] Jason with the support of FRW on 10/29/20. I understand it will take 3-4 weeks until baby is active. I will received paperwork in mail from [place]  [facility].   2. I will answer phone call in 3 weeks when FRW calls on status on   2.  I will communicate with CHW at outreach.

## 2021-06-12 NOTE — PROGRESS NOTES
"Please see \"Imaging\" tab under Chart Review for full report.  This ultrasound was performed in the [facility], and may be located under Care Everywhere.    Kalyani Sierra MD  Maternal Fetal Medicine    "

## 2021-06-12 NOTE — PROGRESS NOTES
ASSESSMENT and plan   1. Type 2 diabetes mellitus without complication, without long-term current use of insulin (H)  Rechecking an A1c today blood sugar log was not brought in today she says her blood sugars range between 110 230.  - metFORMIN (GLUCOPHAGE) 1000 MG tablet; Take 1 tablet (1,000 mg total) by mouth 2 (two) times a day with meals.  Dispense: 60 tablet; Refill: 0  - Glycosylated Hemoglobin A1c    2. Hyperlipidemia LDL goal <100    She is not fasting I will check a lipid panel at next visit.    3. Gastroesophageal reflux disease with esophagitis without hemorrhage    Currently no GERD-like symptoms noted.    There are no Patient Instructions on file for this visit.    Orders Placed This Encounter   Procedures     Glycosylated Hemoglobin A1c     Medications Discontinued During This Encounter   Medication Reason     metFORMIN (GLUCOPHAGE) 1000 MG tablet Reorder       No follow-ups on file.    CHIEF COMPLAINT:  Chief Complaint   Patient presents with     Follow-up     blood sugar       HISTORY OF PRESENT ILLNESS:  Stacey is a 40 y.o. female who is here today because of a follow-up for diabetes.  She also reports that she feels that she needs vitamins as she is tired however she is new mom and her child tends to sleep during the whole day and stay up at night so she thinks this may be a factor she has been taking her Metformin regularly notes no weakness, headache and just fatigue.  She has noticed any chest pain or shortness of breath.  She states that she has been checking her blood sugars and she forgot to bring in her meter today.  Her  states that she has not been dizzy at home.    REVIEW OF SYSTEMS:     General positive for fatigue  Otherwise 12 point review of  All other systems are negative.    PFSH:     with the new child is less than 1 month and age    TOBACCO USE:  Social History     Tobacco Use   Smoking Status Never Smoker   Smokeless Tobacco Former User     Types: Chew   Tobacco  "Comment    chews betel nut NO Tobacco       VITALS:  Vitals:    10/30/20 1032   BP: 110/60   Pulse: 78   Resp: 18   Temp: 98.1  F (36.7  C)   TempSrc: Oral   Weight: 134 lb 1 oz (60.8 kg)   Height: 4' 10.5\" (1.486 m)     Wt Readings from Last 3 Encounters:   10/30/20 134 lb 1 oz (60.8 kg)   10/05/20 155 lb (70.3 kg)   09/30/20 155 lb (70.3 kg)       PHYSICAL EXAM:  Interactive lady sitting comfortably in exam room in no acute distress  HEENT neck supple mucous members moist there is no thyroid enlargement there is no lymph enlargement noted in the neck  Respiratory system clear to auscultation equal breath sounds no wheeze no crackles  GI the abdomen soft there is no focal tenderness  Musculoskeletal system no tenderness elicited when I palpate her lumbar and thoracic spine  CVS regular rate and rhythm no murmurs rubs gallops appreciated  Psych oriented x3 not agitated fund of knowledge is good affect is normal    DATA REVIEWED:  Additional History from Old Records Summarized (2): 0  Decision to Obtain Records (1): 0  Radiology Tests Summarized or Ordered (1): 0  Labs Reviewed or Ordered (1): 0  Medicine Test Summarized or Ordered (1): 0  Independent Review of EKG or X-RAY(2 each): 0    The visit lasted a total of 25 minutes face to face with the patient. Over 50% of the time was spent counseling and educating the patient about   Diabetes, fatigue..    MEDICATIONS:  Current Outpatient Medications   Medication Sig Dispense Refill     acetaminophen (TYLENOL) 325 MG tablet Take 2 tablets (650 mg total) by mouth every 6 (six) hours as needed for pain. 100 tablet 1     blood glucose test (CONTOUR TEST STRIPS) strips Use 1 each As Directed 4 (four) times a day. Dispense brand per patient's insurance at pharmacy discretion. 100 strip 6     calcium, as carbonate, (TUMS) 200 mg calcium (500 mg) chewable tablet Chew 1 tablet (200 mg total) daily. 100 tablet 3     docusate sodium (COLACE) 100 MG capsule Take 1 capsule (100 mg " "total) by mouth 2 (two) times a day as needed for constipation. 60 capsule 0     generic lancets (FINGERSTIX LANCETS) Use 1 each As Directed 4 (four) times a day. Dispense brand per patient's insurance 100 each 6     ibuprofen (ADVIL,MOTRIN) 800 MG tablet Take 1 tablet (800 mg total) by mouth every 8 (eight) hours. 30 tablet 0     metFORMIN (GLUCOPHAGE) 1000 MG tablet Take 1 tablet (1,000 mg total) by mouth 2 (two) times a day with meals. 60 tablet 0     pen needle, diabetic 32 gauge x 5/32\" Ndle Use 1 each As Directed 3 (three) times a day. 100 each 1     prenatal vit-iron fum-folic ac (PRENATAL VITAMIN) 27 mg iron- 0.8 mg Tab tablet Take 1 tablet by mouth daily. 100 tablet 3     No current facility-administered medications for this visit.      aDny BOB  "

## 2021-06-13 NOTE — PROGRESS NOTES
Clinic Care Coordination Contact     Patient has completed all goals with Clinic Care Coordination.     Please review the chart and confirm if Maintenance is approved.

## 2021-06-13 NOTE — PROGRESS NOTES
"JEN Melendez is a 37 y.o. female here for Pap. Would only like a call if results are ABNORMAL.     Sees Dr. Noel for PCP. Had cholesterol checked but is awaiting a call from him on when to come back.   Past Medical History:   Diagnosis Date     Hypertriglyceridemia      Current Outpatient Prescriptions on File Prior to Visit   Medication Sig Dispense Refill     levonorgestrel-ethinyl estradiol (ORSYTHIA) 0.1-20 mg-mcg per tablet Take 1 tablet by mouth daily. 84 tablet 3     omeprazole (PRILOSEC) 20 MG capsule Take 1 capsule (20 mg total) by mouth daily. 30 capsule 11     ranitidine (ZANTAC) 150 MG tablet Take 1 tablet (150 mg total) by mouth 2 (two) times a day. 60 tablet 11     No current facility-administered medications on file prior to visit.      Social Hx:  Marital Status:     Past medical and social history reviewed with no changes.   ?  ROS:   : No abnormal bleeding. No pain with urination. Is having itchiness of vagina but no discharge.  ?  O  /82  Pulse 60  Temp 98.2  F (36.8  C) (Oral)   Resp 16  Ht 4' 10\" (1.473 m)  Wt 130 lb 8 oz (59.2 kg)  LMP 09/28/2017  Breastfeeding? No  BMI 27.27 kg/m2   Vitals reviewed. Nursing note reviewed.  General Appearance: Pleasant and alert, in no acute distress  HEENT: mucous membranes moist,  Pelvic:   Genitourinary Exam:   Vulva: normal skin.  No lesions noted.  Nontender.    Urethral meatus: normal size and location, no lesions or discharge   Urethra: no tenderness or masses   Bladder: no fullness or tenderness   Vagina: normal appearance, physiologic discharge. No evidence of cystocele or rectocele.   Cervix: normal appearance, no lesions, no cervical motion tenderness   Uterus: normal size and position, mobile, non-tender   Pap smear obtained  Adnexa: no palpable masses bilaterally   Ext: No peripheral edema, good distal perfusion  Skin: warm, dry, intact, no rash noted  Neuro: no focal deficits, CNs II-XII normal.   A/P  Stacey was seen today " for pap only.    Diagnoses and all orders for this visit:    Vaginal itching  -     Wet Prep, Vaginal    Encounter for screening for cervical cancer   -     Gynecologic Cytology (PAP Smear)    Hypertriglyceridemia/Hypercholesteremia: labs 2 weeks ago showed total cholesterol 253, low HDL at 48, high LDL at 141 and high triglycerides at 321. Also had random glucose of 220 and may have undiagnosed diabetes. Also appears to meet criteria for a statin. Advised to make follow up appt with PCP Dr. Noel in 2 weeks.      Visit completed along with assistance of Wendy .  Options for treatment and follow-up care were reviewed with the patient and/or guardian. [facility] Paw and/or guardian engaged in the decision making process and verbalized understanding of the options discussed and agreed with the final plan.    Kandis Tucker MD

## 2021-06-13 NOTE — PROGRESS NOTES
Clinic Care Coordination Contact  Program: Adding insurance for   [place]:  [facility] Case #:  [facility] Worker:   Jason #:   PMI #:   Date Applied: 10/29/20      Outreach:   20: FRW checked [place], patient's  has active Blue Plus MA. FRW added health insurance to newborns epic record and sent an email to Windy Ramirez in billing to reprocess claims. FRW called patient to explain to mother that insurance bills will be reprocessed and could take up to 30 days. Patient will receive a Medicaid card first then Blue Plus care in about 3 weeks. Mother will bring cards to clinic once she has them. FRW completed goal and routed note to CC team for standard of work.   Major Programs   This subscriber has eligibility for MA: Medical Assistance.   Elig Type 11: Automatic  eligibility   Eligibility Begin Date: 10/01/2020   Eligibility End Date: --/--/----   This subscriber is eligible for the following service types: Medical Care , Chiropractic , Dental Care , Hospital , Hospital - Inpatient , Hospital - Outpatient , Emergency Services , Pharmacy , Professional (Physician) Visit - Office , Vision (Optometry) , Mental Health , Urgent Care   Prepaid Health Plan   This subscriber receives MA12 - Prepaid Medical Assistance Program (PMAP) delivered through [employer]. The phone numbers     20: FRW called [employer] and spoke with Mitzi.  has been added to case.FRW called patient's mother to confirm as MA isn't showing MA is active with PMI #[id number]. FRW will call mother again in 1 week.   10/29/20: FRW and patient called [facility] to add  to family case. [place] answered and added  to case.  will be added and active in 2-4 weeks. Family will get insurance paperwork in mail and FRW will call patient in 3 weeks. FRW updated action steps in financial wellbeing goals and routed note to CC team for standard of work.   10/21/20: FRW called patient to discuss referral. Appointment made  for 10/29 to call [facility] and added . FRW updated action steps in financial wellbeing and routed note to CC team for standard of work.       Health Insurance:      Referral/Screening:   Hi,     Stacey Al just wants to make sure her baby gets added to insurance.     : Uriel Gold - [phone]       Thank you,     Cherry    Goals Addressed:   Goals Addressed                 This Visit's Progress       Patient Stated      Financial Wellbeing (pt-stated)   80%     Goal statement: I want to add my baby to health insurance and resolve any outstanding account balance within 3 months of my baby's birth.    Date goal set: 20  Barriers: Language barrier, high risk pregnancy  Strengths: Appears willing to accept support, has reliable source of transportation (self), good familial support by older children and spouse  Date to achieve by: 20  Patient expressed understanding of goal: Yes    Action steps to achieve this goal:  1.  I will contacted [facility] with the support of FRW on 10/29/20. I understand it will take 3-4 weeks until baby is active. I will received paperwork in mail from [facility].   2. I will answer phone call in 3 weeks when FRW calls on status on   2.  I will communicate with CHW at outreach.

## 2021-06-13 NOTE — PROGRESS NOTES
Clinic Care Coordination Contact    Community Health Worker Follow Up    Goals:     Goals Addressed                 This Visit's Progress       Patient Stated      Financial Wellbeing (pt-stated)        Goal statement: I want to add my baby to health insurance and resolve any outstanding account balance within 3 months of my baby's birth.    Date goal set: 20  Barriers: Language barrier, high risk pregnancy  Strengths: Appears willing to accept support, has reliable source of transportation (self), good familial support by older children and spouse  Date to achieve by: 20  Patient expressed understanding of goal: Yes    Action steps to achieve this goal:  1.  I understand that my child's insurance is active but I will monitor my incoming mail for the [employer] [place] card.  2.  I will answer my phone when the FRW calls me to follow up about the status of my outstanding account balance.                CHW Outreach Conversation:    Stacey Melendez reports that she understands her baby has been added to [employer] MA but she does not have the insurance cards and she still has an outstanding balance. The CHW will route this update to the FRW for future follow up on status of insurance cards and account balance.      PMI#: [id number]     Major Programs  This subscriber has eligibility for MA: Medical Assistance.  Elig Type 11: Automatic  eligibility  Eligibility Begin Date: 10/01/2020  Eligibility End Date: --/--/----    Prepaid Health Plan  This subscriber receives MA12 - Prepaid Medical Assistance Program (PMAP) delivered through [employer]. The phone numbers is [phone] ().      CHW Next Follow-up: 1 month    CHW Plan: Review FRW notes and account balance

## 2021-06-13 NOTE — PROGRESS NOTES
ASSESSMENT AND PLAN:  1. Gastroesophageal reflux disease without esophagitis  No relief with ranitidine last H. pylori test was negative.  Starting omeprazole follow-up as needed.    2. Back pain  Back pain which she describes as being burning in nature, pain relieved with pressure.  Not related to exertion.  Doing a lipase to rule out pancreatitis  - Lipase    3. Hypertriglyceridemia  Triglycerides were 400 there is a familial cause rechecking today doing a BMP checking a thyroid as well  - Lipid Profile  - Basic Metabolic Panel  - Thyroid Stimulating Hormone (TSH)    4. Lymphadenopathy  Solitary lymph node enlargement noted as mentioned in the HPI checking a hemoglobin today no sore throat no other constitutional symptoms noted  - HM1(CBC and Differential)  - HM1 (CBC with Diff)          Orders Placed This Encounter   Procedures     Lipase     Lipid Profile     Order Specific Question:   Fasting is required?     Answer:   Yes     Basic Metabolic Panel     Thyroid Stimulating Hormone (TSH)     HM1 (CBC with Diff)     There are no discontinued medications.    No Follow-up on file.    CHIEF COMPLAINT:  Chief Complaint   Patient presents with     Follow-up     abnormal lab test result       HISTORY OF PRESENT ILLNESS:  Stacey is a 37 y.o. female presenting to follow up on her elevated cholesterol levels. Stacey is present with a [employer] .     Hyperlipidemia: She had blood work done on 9/21/2017 that showed elevated cholesterol levels. Her mother has elevated cholesterol levels as well. She does not recall eating high fat content meals prior to that lab test. She ate a little bit the morning of the test though, so she was not completely fasting.     Abdominal Pain: She has had an abdominal burning intermittently for quite some time. She had H. Pylori last year. She tested negative for it this year. She has not been taking ranitidine because it does not help. She currently has the burning sensation. She develops  nausea but does not feel close to syncope. She does not have a bad taste in her mouth.     Back Pain: She has pain in her back, and she thinks it might be related to her abdominal pain. She does not have pain with deep breaths. Pushing on her back makes the pain go away, so she has her kids massage the area. The pain gets worse when she lies down at night and she frequently has to change position.     Neck Swelling: She has a lump under her chin. It does not hurt to swallow, but it is slightly tender to palpation. She has had this for about a week, but the swelling is improving.     REVIEW OF SYSTEMS:   She denies pain with inspiration.   All other 10 point review of systems are negative.    PFSH:  Her mother has hyperlipidemia. Pertinent past, family, social and medical history reviewed.     TOBACCO USE:  History   Smoking Status     Never Smoker   Smokeless Tobacco     Never Used       VITALS:  Vitals:    10/06/17 1051   BP: (!) 126/96   Patient Site: Right Arm   Patient Position: Sitting   Cuff Size: Adult Regular   Pulse: 93   Temp: 98.2  F (36.8  C)   TempSrc: Oral   SpO2: 98%   Weight: 127 lb 7 oz (57.8 kg)     Wt Readings from Last 3 Encounters:   10/06/17 127 lb 7 oz (57.8 kg)   09/28/17 130 lb 2 oz (59 kg)   09/21/17 130 lb 7 oz (59.2 kg)     Body mass index is 26.63 kg/(m^2).    PHYSICAL EXAM:  General: Alert, cooperative, no distress, appears stated age  Head: Normocephalic, without obvious abnormality, atraumatic  Neck: Swelling on submental area. Thyroid not enlarged or tender. 2 x 1 cm slightly tender circular mass in neck 4 cm inferior to chin, mobile.  Skin:  No erythema over skin.   Musculoskeletal: Back symmetric, no curvature, ROM normal, no CVA tenderness.  Lungs: Clear to auscultation bilaterally, respirations unlabored  Heart: Regular rate and rhythm, S1 and S2 normal, no murmur, rub, or gallop  CVS: No edema noted.   Abdomen: Tenderness in epigastric area and LUQ.   Neurologic: No tremor, no  focal findings.  Normal gait.   Psych: Oriented x3. Affect normal.     DATA REVIEWED:  Additional History from Old Records Summarized (2): None  Decision to Obtain Records (1): None  Radiology Tests Summarized or Ordered (1): None  Labs Reviewed or Ordered (1): Labs from 9/21/2017 reviewed. Labs ordered.   Medicine Test Summarized or Ordered (1): None  Independent Review of EKG or X-RAY(2 each): None    The visit lasted a total of 13 minutes face to face with the patient. Over 50% of the time was spent counseling and educating the patient about her hyperlipidemia.     Smooth MARR, am scribing for and in the presence of, Dr. Noel.    Ihao, personally performed the services described in this documentation, as scribed by Smooth Pettit in my presence, and it is both accurate and complete.      MEDICATIONS:  Current Outpatient Prescriptions   Medication Sig Dispense Refill     levonorgestrel-ethinyl estradiol (ORSYTHIA) 0.1-20 mg-mcg per tablet Take 1 tablet by mouth daily. 84 tablet 3     omeprazole (PRILOSEC) 20 MG capsule Take 1 capsule (20 mg total) by mouth daily. 30 capsule 11     ranitidine (ZANTAC) 150 MG tablet Take 1 tablet (150 mg total) by mouth 2 (two) times a day. 60 tablet 11     No current facility-administered medications for this visit.        Total Data Points: 1

## 2021-06-13 NOTE — PROGRESS NOTES
ASSESSMENT AND PLAN:  1. Gastroesophageal reflux disease without esophagitis saw Dr. Shepard started on ranitidine but only taking it once a day noted some decrease in symptoms would like dose of twice daily.  Rechecking H. pylori testing was positive in the past.  If test results are positive I will prescribe her to H. pylori Antigen, Stool   2. 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 Lipid Profile    QTF-Mycobacterium tuberculosis by QuantiFERON-TB Gold   3. Preventative health care lipid panel to be drawn today influenza shot given she will have her well woman physical with a female provider so it has been 4 years since her last Pap Influenza, Seasonal Quad, Preservative Free 36+ Months     CHIEF COMPLAINT:  Chief Complaint   Patient presents with     TB     for PCA     Abdominal Pain       HISTORY OF PRESENT ILLNESS:  Stacey is a 37 y.o. female presenting for a TB test for a PCA. Stacey is present with a [employer] . Her last Quantiferon-TB gold test from 7/21/2016 was negative.     Abdominal Pain: She was seen by Dr. Shepard on 8/11/2017 for an evaluation of her abdominal pain. She was prescribed ranitidine 150 mg. Currently, she states that her abdominal pain has not improved. She has been taking ranitidine 150mg once a day instead twice a day. She denies changes in her voice and difficulty swallowing. She will experience diarrhea if she eats spicy foods. Of note, she was treated for H. Pylori last year.     REVIEW OF SYSTEMS:   She denies vomiting.    All other 10 point review of systems are negative.    PFSH:  Reviewed as below.     TOBACCO USE:  History   Smoking Status     Never Smoker   Smokeless Tobacco     Never Used       VITALS:  Vitals:    09/21/17 1512   BP: 136/80   Patient Site: Right Arm   Patient Position: Sitting   Cuff Size: Adult Regular   Pulse: 65  "  Temp: 98.2  F (36.8  C)   TempSrc: Oral   SpO2: 97%   Weight: 130 lb 7 oz (59.2 kg)   Height: 4' 10\" (1.473 m)     Wt Readings from Last 3 Encounters:   09/21/17 130 lb 7 oz (59.2 kg)   08/11/17 129 lb 12 oz (58.9 kg)   11/01/16 134 lb 8 oz (61 kg)     Body mass index is 27.26 kg/(m^2).    PHYSICAL EXAM:  General: Alert, cooperative, no distress, appears stated age  Lungs: Clear to auscultation bilaterally, respirations unlabored  Chest wall: No tenderness or deformity  Heart: Regular rate and rhythm, S1 and S2 normal, no murmur, rub, or gallop  Abdomen: Soft, non tender, bowel sounds active all four quadrants, no masses, no organomegaly.  Neurologic:  A & O x 3.  No tremor, no focal findings.      DATA REVIEWED:  Additional History from Old Records Summarized (2): Reviewed Dr. Shepard's note from 8/11/2017 regarding abdominal pain.  Decision to Obtain Records (1): None  Radiology Tests Summarized or Ordered (1): None  Labs Reviewed or Ordered (1):Labs ordered.   Medicine Test Summarized or Ordered (1): None  Independent Review of EKG or X-RAY(2 each): None    The visit lasted a total of 12 minutes face to face with the patient. Over 50% of the time was spent counseling and educating the patient about abdominal pain.     ICriss, am scribing for and in the presence of, Dr. Ma.    I,hao ma, personally performed the services described in this documentation, as scribed by Cirss Gayle in my presence, and it is both accurate and complete.      MEDICATIONS:  Current Outpatient Prescriptions   Medication Sig Dispense Refill     levonorgestrel-ethinyl estradiol (ORSYTHIA) 0.1-20 mg-mcg per tablet Take 1 tablet by mouth daily. 84 tablet 3     ranitidine (ZANTAC) 150 MG tablet Take 1 tablet (150 mg total) by mouth 2 (two) times a day. 60 tablet 11     No current facility-administered medications for this visit.        Total Data Points: 3    "

## 2021-06-13 NOTE — TELEPHONE ENCOUNTER
RN cannot approve Refill Request    RN can NOT refill this medication medication not on med list. Last office visit: 9/6/2018 Dianne Vasquez MD Last Physical: Visit date not found Last MTM visit: Visit date not found Last visit same specialty: 10/30/2020 Carlos Noel MD.  Next visit within 3 mo: Visit date not found  Next physical within 3 mo: Visit date not found      Apoorva Iniguez, [facility] Triage/Med Refill 12/20/2020    Requested Prescriptions   Pending Prescriptions Disp Refills     aspirin 81 MG EC tablet [Pharmacy Med Name: ASPIRIN LOW DOSE 81 MG TBEC 81 Tablet] 30 tablet 2     Sig: TAKE 1 TABLET (81 MG TOTAL) BY MOUTH DAILY.       Aspirin/Dipyridamole Refill Protocol Passed - 12/17/2020  9:45 AM        Passed - PCP or prescribing provider visit in past 12 months       Last office visit with prescriber/PCP: 9/6/2018 Dianne Vasquez MD OR same dept: 10/30/2020 Carlos Noel MD OR same specialty: 10/30/2020 Carlos Noel MD  Last physical: Visit date not found Last MTM visit: Visit date not found    Next appt within 3 mo: Visit date not found Next physical within 3 mo: Visit date not found  Prescriber OR PCP: Dianne Vasquez MD  Last diagnosis associated with med order: 1. Supervision of high-risk pregnancy of elderly multigravida  - aspirin 81 MG EC tablet [Pharmacy Med Name: ASPIRIN LOW DOSE 81 MG TBEC 81 Tablet]; Take 1 tablet (81 mg total) by mouth daily.  Dispense: 30 tablet; Refill: 2    If protocol passes may refill for 6 months if within 3 months of last provider visit (or a total of 9 months).
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
## 2021-06-13 NOTE — PROGRESS NOTES
Clinic Care Coordination Contact     Situation: Pt chart reviewed by SW care coordinator.     Background:   - Most recent SW assessment completed on 20.  - Pt initially enrolled for follow up on baby resources, carseat referral, and assistance adding  to health insurance after birth.  - Pt was 33w5d pregnant at time of enrollment.   - Pt gave birth to  baby boy on 10/5/20.  - See FRW notes in chart for further detail regarding progress towards completion of insurance goal as it pertains to adding  to her case. As of 20, both pt and  have active MA.  - Pt was seen by OB provider for postpartum visit on 20.  - Last outreach by CHW on 20. During this conversation, no new goals or concerns were identified.      Assessment: All previous goals completed. Enrollment status adjusted.      Plan/Recommendations:   1.) Pt will be transitioned to CCC maintenance at this time.   - If no new goals/concerns identified at next outreach in 2 months (on/around 21), CHW will route chart to CCC SW for graduation review.

## 2021-06-14 NOTE — PROGRESS NOTES
ASSESSMENT AND PLAN:  1. Hyperlipidemia cholesterol is elevated because of her elevated triglyceridemia I am suspecting that she may have diabetes.  Check an A1c today.  It is elevated.  She will be started on a statin today.  She has a familial history of hyperlipidemia Glycosylated Hemoglobin A1C   2. Abnormal glucose blood sugar above 200 and her last exam     3. Hypertriglyceridemia     4. Vaginal irritation she is complaining of vaginal irritation which is not discharge but due to itchiness around the general area.  She had a wet prep and Pap smear done with Dr. Tucker which proved to be unremarkable I suspect this is due to her glycosuria     5. Diabetes had discussion regarding this today with her.  She will see diabetic education she will see Dr. Gatito Ramirez our Pharm.D. next week.  I started her on metformin.  She understands she needs takes medication daily.  I will see her again within 10 days Ambulatory referral to Diabetes Education (New Diagnosis)   6.  GERD H. pylori test negative omeprazole.  To provide a benefit however she ran out of the medication did not  up refills she can continue take this medication.    CHIEF COMPLAINT:  Chief Complaint   Patient presents with     Hyperlipidemia       HISTORY OF PRESENT ILLNESS:  Stacey is a 37 y.o. female presenting for a follow-up of hyperlipidemia. Stacey is present with a [employer] . She had a lipid profile done on 10/6/2017 which revealed elevated LDL, cholesterol and triglycerides.     Gastritis: She has not taken ranitidine and omeprazole for the past few weeks. She notes that she did not go to the pharmacy to refill the medications. She is currently experiencing burning epigastric abdominal pain.    Diabetes: She is currently experiencing vaginal itching. She notes that she urinates frequently as well. She denies a burning sensation when she is urinating and vaginal discharge. Of note, her A1c today is 9.6.      REVIEW OF SYSTEMS:   She denies  vomiting.   All other 10 point review of systems are negative.    PFSH:  . Pertinent past, family, social and medical history reviewed.     TOBACCO USE:  History   Smoking Status     Never Smoker   Smokeless Tobacco     Never Used     Comment: chews betel nut       VITALS:  Vitals:    11/17/17 0919   BP: 114/70   Patient Site: Right Arm   Patient Position: Sitting   Cuff Size: Adult Regular   Pulse: (!) 103   Temp: 98.4  F (36.9  C)   TempSrc: Oral   SpO2: 98%   Weight: 129 lb 2 oz (58.6 kg)     Wt Readings from Last 3 Encounters:   11/17/17 129 lb 2 oz (58.6 kg)   10/19/17 130 lb 8 oz (59.2 kg)   10/06/17 127 lb 7 oz (57.8 kg)     Body mass index is 26.99 kg/(m^2).    PHYSICAL EXAM:  General: Alert, cooperative, no distress, appears stated age  Throat: Lips, mucosa, and tongue normal; teeth and gums normal  Musculoskeletal: Back symmetric, no curvature, ROM normal, no CVA tenderness.  Lungs: Clear to auscultation bilaterally, respirations unlabored  Chest wall: No tenderness or deformity  Heart: Regular rate and rhythm, S1 and S2 normal, no murmur, rub, or gallop  CVS: No edema noted.   Neurologic: No tremor, no focal findings.   DTRs are normal and symmetric both proximally and distally BUE and BLE.  Strength testing is normal and symetric both proximally and distally BUE and BLE.  Toes downgoing bilaterally.  Normal gait.   Psych: Oriented x3. Affect normal.     DATA REVIEWED:  Additional History from Old Records Summarized (2): Reviewed Dr. Tucker's note from 10/19/2017 regarding pap smear.   Decision to Obtain Records (1): None  Radiology Tests Summarized or Ordered (1): None  Labs Reviewed or Ordered (1): Reviewed labs from 10/6/2017. Labs ordered.   Medicine Test Summarized or Ordered (1): None  Independent Review of EKG or X-RAY(2 each): None    The visit lasted a total of 12 minutes face to face with the patient. Over 50% of the time was spent counseling and educating the patient about hyperlipidemia.     I,  Criss Gayle, am scribing for and in the presence of, Dr. Ma.    I,hao ma personally performed the services described in this documentation, as scribed by Criss Gayle in my presence, and it is both accurate and complete.      MEDICATIONS:  Current Outpatient Prescriptions   Medication Sig Dispense Refill     levonorgestrel-ethinyl estradiol (ORSYTHIA) 0.1-20 mg-mcg per tablet Take 1 tablet by mouth daily. 84 tablet 3     omeprazole (PRILOSEC) 20 MG capsule Take 1 capsule (20 mg total) by mouth daily. 30 capsule 11     ranitidine (ZANTAC) 150 MG tablet Take 1 tablet (150 mg total) by mouth 2 (two) times a day. 60 tablet 11     No current facility-administered medications for this visit.        Total Data Points: 3

## 2021-06-14 NOTE — PROGRESS NOTES
MTM Encounter    Assessment/Plan  Diabetes: Uncontrolled to goal of 7%.  Discussed diabetes pathophysiology, progression, and complications.  Advised restricting rice intake, daily walks, and to start checking blood sugar daily.  - glucometer, test strips and lancets; check blood sugar daily    Dyslipidemia: Doing well with new statin prescription.  Will check lipid cascade in a few more weeks.    Dyspepsia: Well controlled on omeprazole.  Discussed long term side effects of PPIs and rebound gastric secretion.  Also discussed liklihood that this medication will not be covered long term by insurance.  We agreed to continue omeprazole for now, plan to switch to ranitidine in a few months with better education around how to use it.  - Consider switching back to ranitidine in the future    Follow Up  1-4 weeks for diabetes follow up and glucometer teaching      Subjective/Objective  Stacey Melendez is a 37 y.o. female here for a medication therapy management (MTM) appointment; her chief concern today is an initial medication review and diabetes education.    Diabetes: Stacey was recently diagnosed with diabetes and was started on metformin 500mg BID.   No side effects since starting metformin.  Last A1c: 9.6% on 11/17/17  Glucose readings: Stacey  Does not have a glucometer.  Lifestyle interventions:encouragement to exercise, weight loss from baseline weight and lifestyle education regarding diet    Dyslipidemia:  Recently started on simvastatin 20mg.  No side effects since starting simvastatin.  Lab Results   Component Value Date    CHOL 253 (H) 10/06/2017    CHOL 232 (H) 09/21/2017    CHOL 175 03/25/2013     Lab Results   Component Value Date    HDL 48 (L) 10/06/2017    HDL 40 (L) 09/21/2017    HDL 41 03/25/2013     Lab Results   Component Value Date    LDLCALC 141 (H) 10/06/2017    LDLCALC  09/21/2017      Comment:      Invalid, Triglycerides >400    LDLCALC 106 03/25/2013     Lab Results   Component Value Date    TRIG 321 (H)  10/06/2017    TRIG 405 (H) 09/21/2017    TRIG 145 03/25/2013     No components found for: CHOLHDL    GERD: Has been taking omeprazole for about two weeks.  She reports that her stomach feels better since starting this med.   ----------------    [facility]'s medication list was reviewed with them, discussing reason for use, directions for use, and potential side effects of each medication as needed. Indication, safety, efficacy, and convenience was assessed for all medications addressed above.  No environmental factors were noted currently affecting patient.  This care plan was communicated via EMR with her primary care provider, Carlos Noel MD, who is the authorizing prescriber for this visit.  Direct supervision was available by either the patient's PCP or other available provider.  Time and complexity billing metrics are included in the docflowsheet linked to this visit.  Time spent: 55 minutes      Johnathan Dunham PharmD  [facility] Pharmacist  [facility]  [phone]

## 2021-06-14 NOTE — PROGRESS NOTES
ASSESSMENT AND PLAN:  1. Diabetes patient still has high blood sugars, will increase metformin dose to 2000 mg per day.  Check A1c at next visit.  Patient still complains of frequent nocturia.  She knows how to use a glucometer now.  She will see Dr. Tucker again for a pelvic exam if she continues to have vaginal itching and refused a wet prep today    2. Gastroesophageal reflux disease without esophagitis appetites improve no burning noted on a daily basis continue omeprazole          No orders of the defined types were placed in this encounter.    There are no discontinued medications.    No Follow-up on file.    CHIEF COMPLAINT:  Chief Complaint   Patient presents with     Diabetes       HISTORY OF PRESENT ILLNESS:  Stacey is a 37 y.o. female presenting for a follow-up of diabetes. Stacey is present with a [employer] . She is taking her metformin faithfully. She has not checked her fasting blood sugars as she was unsure how to use the glucometer. Her blood sugar was taken at this time and was 296. She wakes up 3-4 times to urinate at night. Numbness in her extremities has improved.  Her last A1c from 11/17/2017 was 9.6.     Gastritis: She is taking omeprazole faithfully. She has intermittent epigastric abdominal pain.     REVIEW OF SYSTEMS:   She denies nausea.    All other 10 point review of systems are negative.    PFSH:   Pertinent past, family, social and medical history reviewed.     TOBACCO USE:  History   Smoking Status     Never Smoker   Smokeless Tobacco     Current User     Types: Chew     Comment: chews betel nut w/tobacco       VITALS:  Vitals:    12/08/17 1346   BP: 120/82   Patient Site: Right Arm   Patient Position: Sitting   Cuff Size: Adult Regular   Pulse: 89   Temp: 98.2  F (36.8  C)   TempSrc: Oral   SpO2: 98%   Weight: 130 lb 9 oz (59.2 kg)     Wt Readings from Last 3 Encounters:   12/08/17 130 lb 9 oz (59.2 kg)   11/17/17 129 lb 2 oz (58.6 kg)   10/19/17 130 lb 8 oz (59.2 kg)     Body mass  index is 27.29 kg/(m^2).    PHYSICAL EXAM:  General: Alert, cooperative, no distress, appears stated age  Head: Normocephalic, without obvious abnormality, atraumatic  Eyes: PERRL, conjunctiva/cornea clear, EOM's intact, fundi benign, both eyes  Ears: Normal TM's and external ear canals, both ears  Nose: Nares normal, septum midline, mucosa normal, no drainage or sinus tenderness  Throat: Lips, mucosa, and tongue normal; teeth and gums normal  Musculoskeletal: Back symmetric, no curvature, ROM normal, no CVA tenderness.  Lungs: Clear to auscultation bilaterally, respirations unlabored  Chest wall: No tenderness or deformity  Heart: Regular rate and rhythm, S1 and S2 normal, no murmur, rub, or gallop  CVS: No edema noted.   Abdomen: Soft, non tender, bowel sounds active all four quadrants, no masses, no organomegaly.  Neurologic: No tremor, no focal findings  Psych: Oriented x3. Affect normal.     DATA REVIEWED:  Additional History from Old Records Summarized (2): None  Decision to Obtain Records (1): None  Radiology Tests Summarized or Ordered (1): None  Labs Reviewed or Ordered (1): None  Medicine Test Summarized or Ordered (1): None  Independent Review of EKG or X-RAY(2 each): None    The visit lasted a total of 12 minutes face to face with the patient. Over 50% of the time was spent counseling and educating the patient about diabetes.     Criss MARR, am scribing for and in the presence of, Dr. Noel.    hao MARR personally performed the services described in this documentation, as scribed by Criss Gayle in my presence, and it is both accurate and complete.      MEDICATIONS:  Current Outpatient Prescriptions   Medication Sig Dispense Refill     metFORMIN (GLUCOPHAGE) 500 MG tablet Take 1 tablet (500 mg total) by mouth 2 (two) times a day with meals. 60 tablet 2     omeprazole (PRILOSEC) 20 MG capsule Take 1 capsule (20 mg total) by mouth daily. 30 capsule 11     simvastatin (ZOCOR) 20 MG  tablet Take 1 tablet (20 mg total) by mouth daily. 90 tablet 3     blood glucose test (CONTOUR NEXT STRIPS) strips Use to check blood sugar once daily. 100 strip 3     blood-glucose meter Misc Use to check your blood sugar daily. 1 each 0     generic lancets (FINGERSTIX LANCETS) Use to check blood sugar daily.  Dispense brand per patient's insurance at pharmacy discretion. 100 each 3     levonorgestrel-ethinyl estradiol (ORSYTHIA) 0.1-20 mg-mcg per tablet Take 1 tablet by mouth daily. 84 tablet 3     ranitidine (ZANTAC) 150 MG tablet Take 1 tablet (150 mg total) by mouth 2 (two) times a day. 60 tablet 11     No current facility-administered medications for this visit.        Total Data Points: 0

## 2021-06-15 NOTE — PROGRESS NOTES
"Assessment: Stacey Arteaga is here for education on diabetes management.  She was recently diagnosed with type 2 diabetes at an a1c of 9.6.  Her last a1c decreased to 8.3 in January.  She is taking metformin 1000mg BID, but frequently forgets to take her morning dose because of the \"busy-ness\" with her kids.  Her morning blood sugars have been: 151, 182, 129, 143, 153, 178, 182, 155.  Evening blood sugars 30-60 minutes after meals were 373 & 351.  Today I went over diabetes pathophysiology, metformin physiology and side effects, A1C & bg goals, preventing future complications, hyperglycemia, portion sizing, and when to test in a day.  She is eating about 3 cups of rice at her two meals and drinks a sugary drink called \"Birdi\".  We discussed reducing her portion closer to 1 cup and eliminating sugary drinks like \"Birdi\".  She insists that she remembers to take metformin in the evening so I would suggest changing her to metformin XR and taking 2000mg in the evening before dinner.  Because of her elevated evening blood sugars, a DPP-4 may help in reducing blood sugar spikes after eating.  I suggest starting 100mg of januvia in the evening before her dinner as well.  We reviewed reducing foods high in carbohydrates and eating more veggies & healthy proteins if she is still hungry.  She was encouraged to walk and exercise more to reduce blood sugars and a need for more medications.      Plan: Stacey Arteaga will continue to check her blood sugars 1-2x/daily before breakfast and at bedtime and will aim for morning BG's <130.  I discussed medication changes with Dr. Noel who agrees with metformin xr 2000mg & januvia 100mg in the evening before dinner.  She will reduce her carbs and increase exercise for better blood sugars.  She will follow up with Dr. Noel and [place] in April and with me in May.      Subjective and Objective:      Stacey Melendez is referred by Dr. Noel for Diabetes Education.     Lab Results   Component Value Date    " HGBA1C 8.3 (H) 01/18/2018         Current diabetes medications:  Metformin 1000-2000mg daily.    Goals     None          Follow up:   Primary care visit  CDE (certified diabetic educator)      Education:     Monitoring   Meter (per above goals): Assessed, Discussed and Literature provided  Monitoring: Assessed, Discussed and Literature provided  BG goals: Assessed, Discussed and Literature provided    Nutrition Management  Nutrition Management: Assessed, Discussed and Literature provided  Weight: Not addressed  Portions/Balance: Assessed, Discussed and Literature provided  Carb ID/Count: Assessed and Discussed  Label Reading: Not addressed  Heart Healthy Fats: Not addressed  Menu Planning: Assessed and Discussed  Dining Out: Not addressed  Physical Activity: Assessed and Discussed  Medications: Assessed and Discussed  Orals: Assessed and Discussed  Injected Medications: Not addressed   Storage/Exp:Not addressed   Site Rotation: Not addressed   Sites Assessed: no    Diabetes Disease Process: Assessed, Discussed and Literature provided    Acute Complications: Prevent, Detect, Treat:  Hypoglycemia: Not addressed  Hyperglycemia: Assessed and Discussed  Sick Days: Not addressed  Driving: Not addressed    Chronic Complications  Foot Care:Assessed and Discussed  Skin Care: Not addressed  Eye: Assessed and Discussed  ABC: Assessed and Discussed  Teeth:Not addressed  Goal Setting and Problem Solving: Assessed and Discussed  Barriers: Assessed and Discussed  Psychosocial Adjustments: Assessed and Discussed      Time spent with the patient: 60 minutes for diabetes education and counseling.   Previous Education: no  Visit Type:DSMT  Hours Remaining: DSMT 9 and MNT 3      Isaiah Allen  2/7/2018

## 2021-06-15 NOTE — PROGRESS NOTES
Clinic Care Coordination Contact     Situation: Pt chart reviewed by  care coordinator.     Background:   - Most recent SW assessment completed on 8/31/20.  - Moved to maintenance on 12/22/20. See SW note from this date for further detail regarding course of [facility] enrollment.  - Pt was last seen by OB provider (Dr. Vasquez) for a postpartum f/u visit on 11/4/20.  - No ED visits or hospitalizations in the past 45 days.  - Last outreach by CHW on 2/19/21. No new goals or concerns identified.     Assessment: All previous goals completed. Episode resolved. Enrollment status adjusted. CCC team members removed from Care Team.     Plan/Recommendations:   1.) Pt will be graduated from [facility] at this time.

## 2021-06-15 NOTE — PROGRESS NOTES
ASSESSMENT AND PLAN:  1. Diabetes she did see our pharmacist recheck her A1c she has been taking the metformin thousand milligrams twice a day without any side effects.  She admits she has not been watching her diet.  No weakness headaches loss of vision or dizziness have been noted    2. Type 2 diabetes mellitus without complication, without long-term current use of insulin  metFORMIN (GLUCOPHAGE) 1000 MG tablet    Glycosylated Hemoglobin A1c   3. Gastroesophageal reflux disease withou uses omeprazole only as needed t esophagitis         CHIEF COMPLAINT:  Chief Complaint   Patient presents with     Diabetes     other     TB test for PCA       HISTORY OF PRESENT ILLNESS:  Stacey is a 37 y.o. female presenting for a follow-up. Stacey is present with a Wendy .    Diabetes: She has been taking her diabetes medication faithfully. She has not checked her blood sugars recently as she has forgotten how to use the glucometer and she was unable to  the strips. Her last morning glucose levels recorded were in the 150s. Her last A1c from 11/17/2017 was 9.6.     Gastritis: She is taking omeprazole as needed for her epigastric abdominal pain.       REVIEW OF SYSTEMS:   She denies nausea.    All other 10 point review of systems are negative.    PFSH:  . Pertinent past, family, social and medical history reviewed.     TOBACCO USE:  History   Smoking Status     Never Smoker   Smokeless Tobacco     Current User     Types: Chew     Comment: chews betel nut w/tobacco       VITALS:  Vitals:    01/18/18 1352   BP: 116/80   Patient Site: Left Arm   Patient Position: Sitting   Cuff Size: Adult Regular   Pulse: 89   Temp: 98  F (36.7  C)   TempSrc: Oral   SpO2: 97%   Weight: 129 lb 9 oz (58.8 kg)     Wt Readings from Last 3 Encounters:   01/18/18 129 lb 9 oz (58.8 kg)   01/03/18 130 lb 12 oz (59.3 kg)   12/08/17 130 lb 9 oz (59.2 kg)     Body mass index is 27.08 kg/(m^2).    PHYSICAL EXAM:  General: Alert, cooperative, no  distress, appears stated age  Head: Normocephalic, without obvious abnormality, atraumatic  Lungs: Clear to auscultation bilaterally, respirations unlabored  Chest wall: No tenderness or deformity  Heart: Regular rate and rhythm, S1 and S2 normal, no murmur, rub, or gallop  CVS: No edema noted.   Neurologic: No tremor, no focal findings.     Psych: Oriented x3. Affect normal.     DATA REVIEWED:  Additional History from Old Records Summarized (2): None  Decision to Obtain Records (1): None  Radiology Tests Summarized or Ordered (1): None  Labs Reviewed or Ordered (1): Reviewed labs from 9/27/2017. Labs ordered.    Medicine Test Summarized or Ordered (1): None  Independent Review of EKG or X-RAY(2 each): None    The visit lasted a total of 10 minutes face to face with the patient. Over 50% of the time was spent counseling and educating the patient about diabetes.     ICriss, am scribing for and in the presence of, Dr. Noel.    Ihao personally performed the services described in this documentation, as scribed by Criss Gayle in my presence, and it is both accurate and complete.      MEDICATIONS:  Current Outpatient Prescriptions   Medication Sig Dispense Refill     blood glucose test (CONTOUR NEXT STRIPS) strips Use to check blood sugar once daily. 100 strip 3     blood glucose test (CONTOUR TEST STRIPS) strips Use 1 each As Directed as needed. Dispense brand per patient's insurance at pharmacy discretion. 100 strip 3     blood-glucose meter Misc Use to check your blood sugar daily. 1 each 0     generic lancets (FINGERSTIX LANCETS) Use to check blood sugar daily.  Dispense brand per patient's insurance at pharmacy discretion. 100 each 3     levonorgestrel-ethinyl estradiol (ORSYTHIA) 0.1-20 mg-mcg per tablet Take 1 tablet by mouth daily. 84 tablet 3     metFORMIN (GLUCOPHAGE) 1000 MG tablet Take 1 tablet (1,000 mg total) by mouth 2 (two) times a day with meals. 180 tablet 3     omeprazole  (PRILOSEC) 20 MG capsule Take 1 capsule (20 mg total) by mouth daily. 30 capsule 11     ranitidine (ZANTAC) 150 MG tablet Take 1 tablet (150 mg total) by mouth 2 (two) times a day. 60 tablet 11     simvastatin (ZOCOR) 20 MG tablet Take 1 tablet (20 mg total) by mouth daily. 90 tablet 3     No current facility-administered medications for this visit.        Total Data Points: 1

## 2021-06-15 NOTE — PROGRESS NOTES
MTM Encounter    Assessment/Plan  Diabetes: Uncontrolled to goal of 7%.  We reviewed proper technique for her glucometer and Stacey Arteaga was instructed to check her blood sugar first thing in the morning 2-3 times per week.  We also discussed the need for annual foot and eye exams today.  Reviewed the need to reduce carbohydrates, exercise regularly and the benefits of losing weight.  I told her without lifestyle changes, she would likely need an additional medication, and this second medication is likely to be started when she meets with our diabetes educator in one month if her blood sugar is still >130 mg/dL.  She would also like to have 1000mg metformin tablets in place of the 500mg tablets.    - Continue current therapy    Dyslipidemia: Started on statin 6 weeks ago; rechecking fasting lipid cascade today.  - fasting lipid cascade    Mycotic Infection: Reviewed how to take medications - she did not know. Also reviewed that this was likely caused by glucosuria.   - fluconazole 150mg once  - miconazole cream nightly until sx resolved    Follow Up  PCP appt in two weeks  DM Ed in one month  MTM in 3 months      Subjective/Objective  Stacey Melendez is a 37 y.o. female here for a follow up medication therapy management (MTM) appointment; her chief concern today is diabetes.    Diabetes: Stacey Arteaga was diagnosed with diabetes in Nov '17 and started on metformin.  Her dose was titrated up to 1000mg BID in December.  She has a Contour Next glucometer and has been checking her blood sugar one time per day.  Her AM fasting glucose readings have been 170-190 since increasing her dose of metformin.  She denies any GI upset or diarrhea.  Stacey Arteaga is eating 2-3 meals per day.  Mostly rice with some veggies and meat.  The following high BMI interventions were performed this visit: encouragement to exercise, weight loss from baseline weight and lifestyle education regarding diet  Lab Results   Component Value Date    HGBA1C 9.6 (H)  11/17/2017     Lab Results   Component Value Date    GLUF 70 03/25/2013    LDLCALC 141 (H) 10/06/2017    CREATININE 0.75 10/06/2017     Dyslipidemia: Stacey Arteaga was started on simvastatin 20mg in Nov '17 after being found to have elevated TG and LDL.  She has tolerated this medication well so far without side effect.  Lab Results   Component Value Date    CHOL 253 (H) 10/06/2017    CHOL 232 (H) 09/21/2017    CHOL 175 03/25/2013     Lab Results   Component Value Date    HDL 48 (L) 10/06/2017    HDL 40 (L) 09/21/2017    HDL 41 03/25/2013     Lab Results   Component Value Date    LDLCALC 141 (H) 10/06/2017    LDLCALC  09/21/2017      Comment:      Invalid, Triglycerides >400    LDLCALC 106 03/25/2013     Lab Results   Component Value Date    TRIG 321 (H) 10/06/2017    TRIG 405 (H) 09/21/2017    TRIG 145 03/25/2013     No components found for: CHOLHDL    Mycotic infection: She has not picked the medications for this yet - still having itching.    ----------------    Stacey's medication list was reviewed with them, discussing reason for use, directions for use, and potential side effects of each medication as needed. Indication, safety, efficacy, and convenience was assessed for all medications addressed above.  No environmental factors were noted currently affecting patient.  This care plan was communicated via EMR with her primary care provider, Carlos Noel MD, who is the authorizing prescriber for this visit.  Direct supervision was available by either the patient's PCP or other available provider.  Time and complexity billing metrics are included in the docflowsheet linked to this visit.  Time spent: 30 minutes      Johnathan Dunham, [place]  [facility] Pharmacist  [facility]  [phone]

## 2021-06-15 NOTE — PROGRESS NOTES
Clinic Care Coordination Contact     Patient has completed all goals with Clinic Care Coordination.     Please review the chart and confirm if Graduation is approved.

## 2021-06-15 NOTE — PROGRESS NOTES
"JEN Melendez is a 37 y.o. female here for vaginal itching for 1-2 months. No discharge or odor.     Has diabetes and recently started a medication. Dr. Noel is PCP.   Past Medical History:   Diagnosis Date     Hypertriglyceridemia      Current Outpatient Prescriptions on File Prior to Visit   Medication Sig Dispense Refill     blood glucose test (CONTOUR NEXT STRIPS) strips Use to check blood sugar once daily. 100 strip 3     blood glucose test (CONTOUR TEST STRIPS) strips Use 1 each As Directed as needed. Dispense brand per patient's insurance at pharmacy discretion. 100 strip 3     blood-glucose meter Misc Use to check your blood sugar daily. 1 each 0     generic lancets (FINGERSTIX LANCETS) Use to check blood sugar daily.  Dispense brand per patient's insurance at pharmacy discretion. 100 each 3     levonorgestrel-ethinyl estradiol (ORSYTHIA) 0.1-20 mg-mcg per tablet Take 1 tablet by mouth daily. 84 tablet 3     metFORMIN (GLUCOPHAGE) 500 MG tablet Take 2 tablets (1,000 mg total) by mouth 2 (two) times a day with meals. 120 tablet 2     omeprazole (PRILOSEC) 20 MG capsule Take 1 capsule (20 mg total) by mouth daily. 30 capsule 11     ranitidine (ZANTAC) 150 MG tablet Take 1 tablet (150 mg total) by mouth 2 (two) times a day. 60 tablet 11     simvastatin (ZOCOR) 20 MG tablet Take 1 tablet (20 mg total) by mouth daily. 90 tablet 3     No current facility-administered medications on file prior to visit.      ?  ROS:   GI: No abdominal pain  : No urinary pain, change in color, or frequency   ?  O  /80  Pulse 72  Temp 98.1  F (36.7  C) (Oral)   Resp 16  Ht 4' 10\" (1.473 m)  Wt 130 lb 12 oz (59.3 kg)  LMP  (LMP Unknown) Comment: Dec 2017-about 1.5 weeks ago  Breastfeeding? No  BMI 27.33 kg/m2   Vitals reviewed. Nursing note reviewed.  General Appearance: Pleasant and alert, in no acute distress  HEENT: mucous membranes moist  Pelvic: normal-appearing vulva. No discharge seen.   Skin: warm, dry, " intact, no rash noted  Neuro: no focal deficits, CNs II-XII normal.   A/P  Stacey was seen today for vaginal itching.    Diagnoses and all orders for this visit:    Vagina itching  -     Wet Prep, Vaginal- positive for yeast.   -     Chlamydia trachomatis & Neisseria gonorrhoeae, Amplified Detection    Yeast vaginitis: use diflucan first along with monistat. Can keep extra in case it recurs. Explained that until diabetes is under better control, this may be a problem. She will f/u with Dr. Noel in 2 weeks.   -     miconazole (MONISTAT 7) 2 % vaginal cream; Apply to vagina every night until symptoms improve.  -     fluconazole (DIFLUCAN) 150 MG tablet; Take 1 tablet (150 mg total) by mouth once for 1 dose.       Visit completed along with assistance of nathanael .  Options for treatment and follow-up care were reviewed with the patient and/or guardian. [facility] Ler Paw and/or guardian engaged in the decision making process and verbalized understanding of the options discussed and agreed with the final plan.    Kandis Tucker MD

## 2021-06-16 PROBLEM — E11.9 DIABETES (H): Status: ACTIVE | Noted: 2017-12-08

## 2021-06-16 PROBLEM — K21.00 GASTROESOPHAGEAL REFLUX DISEASE WITH ESOPHAGITIS: Status: ACTIVE | Noted: 2017-08-11

## 2021-06-16 PROBLEM — E78.5 HYPERLIPIDEMIA LDL GOAL <100: Status: ACTIVE | Noted: 2018-12-11

## 2021-06-16 PROBLEM — F81.9 LEARNING PROBLEM: Status: ACTIVE | Noted: 2017-09-28

## 2021-06-16 NOTE — TELEPHONE ENCOUNTER
Telephone Encounter by Devora Ash at 3/15/2019 10:54 AM     Author: Devora Ahs Service: -- Author Type: --    Filed: 3/15/2019 10:56 AM Encounter Date: 3/13/2019 Status: Signed    : Devora Ash       PRIOR AUTHORIZATION DENIED    Denial Rational: Patient must try and fail one additional medication from the list below before this will be covered.             Appeal Information: If provider would like to appeal please provide a letter of medical necessity stating why formulary alternatives would not be clinically appropriate for the patient and route back to the PA team.

## 2021-06-16 NOTE — TELEPHONE ENCOUNTER
RN cannot approve Refill Request    RN can NOT refill this medication med is not covered by policy/route to provider. Last office visit: 3/6/2020 Wendi Morales MD Last Physical: Visit date not found Last MTM visit: Visit date not found Last visit same specialty: 10/30/2020 Carlos Noel MD.  Next visit within 3 mo: Visit date not found  Next physical within 3 mo: Visit date not found      Rebecca Toure, [facility] Triage/Med Refill 4/7/2021    Requested Prescriptions   Pending Prescriptions Disp Refills     PRENATAL VITAMIN 27 mg iron- 0.8 mg Tab tablet [Pharmacy Med Name: PRENATAL TABLET 27-0.8 27-0.8 Tablet] 100 tablet 3     Sig: TAKE 1 TABLET BY MOUTH DAILY.       There is no refill protocol information for this order

## 2021-06-16 NOTE — TELEPHONE ENCOUNTER
Refill Approved    Rx renewed per Medication Renewal Policy. Medication was last renewed on 12/21/20.    Roderick Polk, [facility] Triage/Med Refill 3/18/2021     Requested Prescriptions   Pending Prescriptions Disp Refills     aspirin 81 MG EC tablet [Pharmacy Med Name: ASPIRIN 81 MG DELAYED 81 Tablet] 30 tablet 2     Sig: TAKE 1 TABLET (81 MG TOTAL) BY MOUTH DAILY.       Aspirin/Dipyridamole Refill Protocol Passed - 3/17/2021  8:47 AM        Passed - PCP or prescribing provider visit in past 12 months       Last office visit with prescriber/PCP: 10/30/2020 Carlos Noel MD OR same dept: 10/30/2020 Carlos Noel MD OR same specialty: 10/30/2020 Carlos Noel MD  Last physical: 9/21/2017 Last MTM visit: Visit date not found    Next appt within 3 mo: Visit date not found Next physical within 3 mo: Visit date not found  Prescriber OR PCP: Carlos Noel MD  Last diagnosis associated with med order: 1. Supervision of high-risk pregnancy of elderly multigravida  - aspirin 81 MG EC tablet [Pharmacy Med Name: ASPIRIN 81 MG DELAYED 81 Tablet]; TAKE 1 TABLET (81 MG TOTAL) BY MOUTH DAILY.  Dispense: 30 tablet; Refill: 2    If protocol passes may refill for 6 months if within 3 months of last provider visit (or a total of 9 months).

## 2021-06-16 NOTE — TELEPHONE ENCOUNTER
Telephone Encounter by Lamar Ibarra at 3/7/2019  3:33 PM     Author: Lamar Ibarra Service: -- Author Type: --    Filed: 3/7/2019  3:36 PM Encounter Date: 2/27/2019 Status: Signed    : Lamar Ibarra       PRIOR AUTHORIZATION DENIED    Denial Rational:  Must try/fail one more formulary alternative from the list below  *Please note the ones with asterisks next to them are ones that still need a prior authorization          Appeal Information: This medication was denied. If physician would like to appeal because patient has contraindication or allergy to covered medication please write letter of medical necessity and route back to PA team to initiate.  If no further action is needed please close encounter thank you.

## 2021-06-17 NOTE — PROGRESS NOTES
ASSESSMENT AND PLAN:  1. Myalgia has been experiencing myalgia on her forearms arms legs describes as an achiness in her wrists and a burning sensation differential would include neuropathy but she had been taking her medications for diabetes control up to approximately 3 weeks ago.  She may be having a side effect from her simvastatin I am checking LFTs today.  She will hold her statin until further notice. Comprehensive Metabolic Panel   2. Type 2 diabetes mellitus without complication, without long-term current use of insulin blood sugars have become elevated she is not taking any medications for glucose control she was surprised about that.  Denies any issues with her vision. sitaGLIPtin-metformin 50-1,000 mg TM24    Comprehensive Metabolic Panel   3. Gastroesophageal reflux disease without esophagitis occasional burning noted taking ranitidine.  Restarting omeprazole Microalbumin, Random Urine   4. Vaginitis notes a very itching and discomfort no discharge noted treating empirically with Diflucan if symptoms persist she will need a dedicated pelvic exam.  Denies passing blood or clots per vagina denies any fever chills.      CHIEF COMPLAINT:  Chief Complaint   Patient presents with     Diabetes       HISTORY OF PRESENT ILLNESS:  Stacey is a 37 y.o. female presenting for a follow-up of diabetes. Stacey is present with a Wendy . Since the beginning of this month, her morning blood sugars have increased. She states that she did not receive the Janumet that was prescribed by Gatito Dunham, [place] on 4/4/2018. There was mild confusion to her medications as she thought the ranitidine was her diabetes medication. She denies vision changes and polyuria.      Gastritis: She only recently restarted taking omeprazole and ranitidine after about a week. She notes that she is experiencing intermittent burning epigastric abdominal pain.      REVIEW OF SYSTEMS:   She denies chest pain    For the past week, she has been  "experiencing generalized body aches.   All other 10 point review of systems are negative.    PFSH:  Working as PCA. Pertinent past, family, social and medical history reviewed.     TOBACCO USE:  History   Smoking Status     Never Smoker   Smokeless Tobacco     Current User     Types: Chew     Comment: chews betel nut w/tobacco       VITALS:  Vitals:    04/19/18 1414   BP: 110/80   Pulse: 84   Resp: 24   Temp: 98  F (36.7  C)   TempSrc: Oral   SpO2: 97%   Weight: 136 lb 9 oz (61.9 kg)   Height: 4' 10\" (1.473 m)     Wt Readings from Last 3 Encounters:   04/19/18 136 lb 9 oz (61.9 kg)   04/04/18 133 lb 8 oz (60.6 kg)   01/18/18 129 lb 9 oz (58.8 kg)     Body mass index is 28.54 kg/(m^2).    PHYSICAL EXAM:  General: Alert, cooperative, no distress, appears stated age  Head: Normocephalic, without obvious abnormality, atraumatic  Lungs: Clear to auscultation bilaterally, respirations unlabored  Chest wall: No tenderness or deformity  Heart: Regular rate and rhythm, S1 and S2 normal, no murmur, rub, or gallop  CVS: No edema noted.   Abdomen: Soft, non tender, bowel sounds active all four quadrants, no masses, no organomegaly.  Neurologic: No tremor, no focal findings.     Psych: Oriented x3. Affect normal.     DATA REVIEWED:  Additional History from Old Records Summarized (2): Reviewed Gatito Dunham PharmD note from 4/4/2018 regarding medication management.   Decision to Obtain Records (1): None  Radiology Tests Summarized or Ordered (1): none  Labs Reviewed or Ordered (1): None  Medicine Test Summarized or Ordered (1): None  Independent Review of EKG or X-RAY(2 each): none    The visit lasted a total of 13 minutes face to face with the patient. Over 50% of the time was spent counseling and educating the patient about diabetes.     Criss MARR, am scribing for and in the presence of, Dr. Noel.    hao MARR personally performed the services described in this documentation, as scribed by Criss Gayle in my " presence, and it is both accurate and complete.      MEDICATIONS:  Current Outpatient Prescriptions   Medication Sig Dispense Refill     blood glucose test (CONTOUR TEST STRIPS) strips Use 1 each As Directed as needed. Dispense brand per patient's insurance at pharmacy discretion. 100 strip 3     generic lancets (FINGERSTIX LANCETS) Use to check blood sugar daily.  Dispense brand per patient's insurance at pharmacy discretion. 100 each 3     omeprazole (PRILOSEC) 20 MG capsule Take 20 mg by mouth daily.  9     ranitidine (ZANTAC) 150 MG tablet Take 1 tablet (150 mg total) by mouth 2 (two) times a day. 60 tablet 11     simvastatin (ZOCOR) 20 MG tablet Take 1 tablet (20 mg total) by mouth daily. 90 tablet 3     levonorgestrel-ethinyl estradiol (ORSYTHIA) 0.1-20 mg-mcg per tablet Take 1 tablet by mouth daily. 84 tablet 3     sitaGLIPtin-metformin 50-1,000 mg TM24 Take 2 tablets by mouth daily with supper. 60 tablet 11     No current facility-administered medications for this visit.        Total Data Points: 2

## 2021-06-17 NOTE — PATIENT INSTRUCTIONS - HE
Patient Instructions by Barby Anderson PT at 1/3/2019  3:30 PM     Author: Barby Anderson PT Service: -- Author Type: Physical Therapist    Filed: 1/3/2019  4:24 PM Encounter Date: 1/3/2019 Status: Signed    : Barby Anderson PT (Physical Therapist)        ELASTIC BAND ROWS     Holding elastic band with both hands, draw back the band as you bend your elbows. Keep your elbows near the side of your body. 10-15 reps, 3 sets      DOORWAY STRETCH - HIGH    While standing in a doorway, place your arms up on the door frame and lean in until a stretch is felt along the front of your chest and/or shoulders. Your upper arms should be placed upward along the door frame.     NOTE: Your legs should control how much you stretch by bending or straightening your knee through the doorway. Hold 30 sec, 2-3 times      SIDELYING TRUNK ROTATION    While lying on your side with your arms out-stretched in front of your body, slowly twist your upper body to the side and rotated your spine. Your arms and head should also be rotating along with the spine as shown. Follow your hand with your eyes. Hold 10 sec, 5-10 reps on each side

## 2021-06-17 NOTE — PATIENT INSTRUCTIONS - HE
Patient Instructions by Barby Anderson PT at 2/4/2019  3:00 PM     Author: Barby Anderson PT Service: -- Author Type: Physical Therapist    Filed: 2/4/2019  3:15 PM Encounter Date: 2/4/2019 Status: Signed    : Barby Anderson PT (Physical Therapist)        Pec Minor Stretch    Lying over foam roll or towel roll running vertically up spine, let arms hang out to sides feeling a stretch across the chest. Hold 30-60 sec, x2-3 reps, x1-2 times a day

## 2021-06-17 NOTE — PROGRESS NOTES
MTM Encounter    Assessment/Plan  Medication Review: Stacey Arteaga's biggest problem is not getting her meds refilled each month.  She feels having her medications delivered would be a big help.  I will assist her in getting all her medications transferred to [facility] and request delivery of her meds each month.  - Call to have all medications transferred to [facility].    Diabetes: Uncontrolled to goal of 7%.  It appears to me that when Stacey is taking all her medications, her glucose readings are OK.  I would estimate her metformin adherence at around 60% though, mostly missed morning doses.  We will combine all her meds into a one time evening dose.  With 90%+ adherence, this combination should be adequate for now.  - stop metformin XR  - stop Januvia  - start Janumet XR 1000-50    Yeast Vaginitis: Will discuss sending another tablet of fluconazole.  She would like to have this sent to [facility] pharmacy to be delivered with the rest of her medications tomorrow.  She does not want a refill of the cream because she feels this does not help.  Discussed with PCP.  - fluconazole 150mg once.  OK to repeat x1    Follow Up  MTM in six months      Subjective/Objective  Stacey Melendez is a 37 y.o. female here for a follow up medication therapy management (MTM) appointment; her chief concern today is diabetes follow up.    Medication review:  Stacey Arteaga is taking four medications - two for diabetes, one for cholesterol and one for her stomach.  She is currently out of the latter two.  The bottle of Januvia is empty as well.  She is also out of lancets and test strips.    Diabetes:  Stacey Arteaga is taking metformin 1000mg BID and Januvia 100mg daily.  She ran out of her   AM fasting glucose readings since last DM ed appt have ranged from 130-170 with a one time excursion to 250 mg/dL.  Lab Results   Component Value Date    HGBA1C 8.3 (H) 01/18/2018    HGBA1C 9.6 (H) 11/17/2017     Lab Results   Component Value Date    GLUF 70 03/25/2013    LDLCALC 61  01/04/2018    CREATININE 0.75 10/06/2017     ----------------    [facility]'s medication list was reviewed with them, discussing reason for use, directions for use, and potential side effects of each medication as needed. Indication, safety, efficacy, and convenience was assessed for all medications addressed above.  No environmental factors were noted currently affecting patient.  This care plan was communicated via EMR with her primary care provider, Carlos Noel MD, who is the authorizing prescriber for this visit.  Direct supervision was available by either the patient's PCP or other available provider.  Time and complexity billing metrics are included in the docflowsheet linked to this visit.  Time spent: 30 minutes      Eulalio HartmanD  [facility] Pharmacist  [place] [facility]  [phone]

## 2021-06-17 NOTE — PROGRESS NOTES
Assessment: Stacey Arteaga is here for a follow up on diabetes management.  She started taking two tablets of Janumet XR 50-1000mg after dinner.  This is fine because metformin is usually indicated before meal to lessen stomach upset.  Her new a1c came back at 7.6 from 8.3.  She discloses that she eats 2-3 cups of rice per meal.  We talked about how the body becomes glucose overloaded with diabetes.  Extra rice amounts to extra glucose causing the excess amount being stored as fat.  Many of times stored in the liver or pancreas causing organ dysfunction.  She was highly recommended to reduce her rice to 1 cup or less and incorporate more vegetables & healthier proteins.  She tends to eat two meals daily with a snack of fruit inbetween.  She would like to reverse the progression of diabetes so we discussed intermittent fasting to reduce hyperinsulinemia.  This will help give her body time to breakdown & process stored glucose and fat.  She will try to not eat after dinner and make it to her snack every other day without eating.  Then she will advance to her second meal as she feels comfortable.  She will check her blood sugars for the 2-3 weeks that she tries this to verify that her readings are improving.  As always, she was encouraged to walk & exercise more for better health.      Plan: Stacey Arteaga will check her blood sugars 1-2x/daily and continue taking janumet -2000mg around dinnertime.  She will reduce her portions of rice and include more vegetables.  She will also start intermittent fasting to help reverse the progression of diabetes.  She will try for a couple weeks extending the period of time that she doesn't eat.  I.e.  Meal-snack-meal on her odd days and fast-fast-meal or fast-snack-meal on her even days.  This will help reduce hyperinsulinemia causing more release/processing of fat in the liver and pancreas.  She is quite confused about her simvastatin being discontinued.  I asked her to talk with her  doctor about this & clarify.  She will follow up with Dr. Noel in 2 weeks and with Gatito Dunham in a few months.      Subjective and Objective:      Stacey Melendez is referred by Dr. Noel for Diabetes Education.     Lab Results   Component Value Date    HGBA1C 7.6 (H) 05/02/2018         Current diabetes medications:  janumet -2000mg    Goals       Monitoring            5/2/18:  Stacey Melendez will check her blood sugars 1-2x/daily before breakfast and before dinner.          Nutrition            5/2/18: Stacey Melendez will reduce her portions of rice/carbs and start to intermittent fast to help reverse the progression of diabetes.            Follow up:   Primary care visit  CDE (certified diabetic educator)      Education:     Monitoring   Meter (per above goals): Assessed, Discussed and Literature provided  Monitoring: Assessed, Discussed and Literature provided  BG goals: Assessed, Discussed and Literature provided    Nutrition Management  Nutrition Management: Assessed and Discussed  Weight: Assessed and Discussed  Portions/Balance: Assessed and Discussed  Carb ID/Count: Assessed and Discussed  Label Reading: Not addressed  Heart Healthy Fats: Not addressed  Menu Planning: Assessed and Discussed  Dining Out: Assessed and Discussed  Physical Activity: Assessed and Discussed  Medications: Assessed and Discussed  Orals: Assessed and Discussed  Injected Medications: Not addressed   Storage/Exp:Not addressed   Site Rotation: Not addressed   Sites Assessed: no    Diabetes Disease Process: Assessed and Discussed    Acute Complications: Prevent, Detect, Treat:  Hypoglycemia: Not addressed  Hyperglycemia: Assessed and Discussed  Sick Days: Not addressed  Driving: Not addressed    Chronic Complications  Foot Care:Assessed and Discussed  Skin Care: Not addressed  Eye: Assessed and Discussed  ABC: Assessed and Discussed  Teeth:Not addressed  Goal Setting and Problem Solving: Assessed and Discussed  Barriers: Assessed and  Discussed  Psychosocial Adjustments: Assessed and Discussed      Time spent with the patient: 60 minutes for diabetes education and counseling.   Previous Education: yes  Visit Type:DSMT  Hours Remaining: DSMT 7 and MNT 3      Isaiah Allen  5/2/2018

## 2021-06-18 NOTE — PROGRESS NOTES
"ASSESSMENT AND PLAN:  1. Gastroesophageal reflux disease without esophagitis continue omeprazole ranitidine no abdominal pain noted.    2. Type 2 diabetes mellitus without complication, without long-term current use of insulin reviewed last blood tests was patient A1c 7.2 she should continue her current Januvia metformin preparation. Hemoglobin A1c - Glycosylated   3. Physical exam, pre-employment she needs a QuantiFERON test which we will do today. QTF-Mycobacterium tuberculosis by QuantiFERON-TB Gold       CHIEF COMPLAINT:  Chief Complaint   Patient presents with     Diabetes     Forms Request     need Pedro number on form       HISTORY OF PRESENT ILLNESS:  Stacey is a 37 y.o. female presenting for a follow-up. Stacey is present with a [employer] .     Diabetes: She was seen by Avila Allen, Diabetic Educator on 5/2/2018. During that visit, She was highly recommended to reduce her rice to 1 cup or less and incorporate more vegetables & healthier proteins. Currently, she states that she has missed about 7 days of Janumet in a 28 day span. She states her morning blood sugars range from 160-180. Her last A1c from 5/2/2018 was 7.6.     Gastritis: She is taking both omeprazole and ranitidine faithfully.     REVIEW OF SYSTEMS:   She is inquiring about TB testing. She had a TB-Quant done on 9/21/2017 which was negative.    All other 10 point review of systems are negative.    PFSH:  . Pertinent past, family, social and medical history reviewed.     TOBACCO USE:  History   Smoking Status     Never Smoker   Smokeless Tobacco     Current User     Types: Chew     Comment: chews betel nut w/tobacco       VITALS:  Vitals:    05/17/18 1406   BP: (!) 122/96   Patient Site: Right Arm   Patient Position: Sitting   Cuff Size: Adult Regular   Pulse: 84   Resp: 16   Temp: 98.6  F (37  C)   TempSrc: Oral   Weight: 133 lb 9 oz (60.6 kg)   Height: 4' 10\" (1.473 m)     Wt Readings from Last 3 Encounters:   05/17/18 133 lb 9 oz (60.6 kg) "   04/19/18 136 lb 9 oz (61.9 kg)   04/04/18 133 lb 8 oz (60.6 kg)     Body mass index is 27.91 kg/(m^2).    PHYSICAL EXAM:  General: Alert, cooperative, no distress, appears stated age  Head: Normocephalic, without obvious abnormality, atraumatic  Lungs: Clear to auscultation bilaterally, respirations unlabored  Chest wall: No tenderness or deformity  Heart: Regular rate and rhythm, S1 and S2 normal, no murmur, rub, or gallop  CVS: No edema noted.   Neurologic: No tremor, no focal findings.    Psych: Oriented x3. Affect normal.     DATA REVIEWED:  Additional History from Old Records Summarized (2): Reviewed Avila Allen, Diabetic Educator note from 5/2/2018 regarding diabetes.   Decision to Obtain Records (1): None  Radiology Tests Summarized or Ordered (1): none  Labs Reviewed or Ordered (1): Labs ordered.   Medicine Test Summarized or Ordered (1): None  Independent Review of EKG or X-RAY(2 each): None    The visit lasted a total of 13 minutes face to face with the patient. Over 50% of the time was spent counseling and educating the patient about diabetes.     ICriss, am scribing for and in the presence of, Dr. Noel.    Ihao personally performed the services described in this documentation, as scribed by Criss Gayle in my presence, and it is both accurate and complete.      MEDICATIONS:  Current Outpatient Prescriptions   Medication Sig Dispense Refill     omeprazole (PRILOSEC) 20 MG capsule Take 20 mg by mouth daily.  9     ranitidine (ZANTAC) 150 MG tablet Take 1 tablet (150 mg total) by mouth 2 (two) times a day. 60 tablet 11     blood glucose test (CONTOUR TEST STRIPS) strips Use 1 each As Directed as needed. Dispense brand per patient's insurance at pharmacy discretion. 100 strip 3     generic lancets (FINGERSTIX LANCETS) Use to check blood sugar daily.  Dispense brand per patient's insurance at pharmacy discretion. 100 each 3     levonorgestrel-ethinyl estradiol (ORSYTHIA) 0.1-20  mg-mcg per tablet Take 1 tablet by mouth daily. 84 tablet 3     simvastatin (ZOCOR) 20 MG tablet Take 1 tablet (20 mg total) by mouth daily. 90 tablet 3     sitaGLIPtin-metformin 50-1,000 mg TM24 Take 2 tablets by mouth daily with supper. 60 tablet 11     No current facility-administered medications for this visit.        Total Data Points: 3

## 2021-06-18 NOTE — PROGRESS NOTES
"ASSESSMENT AND PLAN:  1. Learning problem patient forgot to bring in her medications she has not been checking her blood sugars have asked her to do this.  She was unaware of her last glucose value.  She does state she is taking her medications she did bring in a copy of her green card today because the wrong number was written on her 648 waiver form    2. Diabetes A1c has improved to below 8 continue his current medications include combination of Januvia and metformin.    3. Gastroesophageal reflux disease without esophagitis no reflux noted she is taking the ranitidine    4. Birth control counseling  levonorgestrel-ethinyl estradiol (ORSYTHIA) 0.1-20 mg-mcg per tablet     CHIEF COMPLAINT:  Chief Complaint   Patient presents with     [facility]     second visit.       HISTORY OF PRESENT ILLNESS:  Stacey is a 38 y.o. female presenting for a follow-up. Stacey is present with a Wendy .     Diabetes: She states she is taking her stiagliptin-metformin faithfully. She has forgotten to bring her glucometer. She does not remember her recent morning blood sugars.  Her last A1c from 5/2/2018 was 7.6.     GERD: She is not sure which medications she is taking for her epigastric abdominal pain.       REVIEW OF SYSTEMS:   She denies nausea   All other 10 point review of systems are negative.    PFSH:  She notes that she went to her [facility] interview recently. She was told that there was something wrong with her form regarding her green card number. Pertinent past, family, social and medical history reviewed.     TOBACCO USE:  History   Smoking Status     Never Smoker   Smokeless Tobacco     Current User     Types: Chew     Comment: chews betel nut w/tobacco       VITALS:  Vitals:    06/07/18 1121   BP: 120/84   Patient Site: Left Arm   Patient Position: Sitting   Cuff Size: Adult Regular   Pulse: 88   Temp: 98.3  F (36.8  C)   TempSrc: Oral   SpO2: 98%   Weight: 132 lb 4.8 oz (60 kg)   Height: 4' 10.25\" " (1.48 m)     Wt Readings from Last 3 Encounters:   06/07/18 132 lb 4.8 oz (60 kg)   05/17/18 133 lb 9 oz (60.6 kg)   04/19/18 136 lb 9 oz (61.9 kg)     Body mass index is 27.41 kg/(m^2).    PHYSICAL EXAM:  General: Alert, cooperative, no distress, appears stated age  Head: Normocephalic, without obvious abnormality, atraumatic  Lungs: Clear to auscultation bilaterally, respirations unlabored  Chest wall: No tenderness or deformity  Heart: Regular rate and rhythm, S1 and S2 normal, no murmur, rub, or gallop  CVS: No edema noted.   Abdomen: Soft, non tender, bowel sounds active all four quadrants, no masses, no organomegaly.  Neurologic: No tremor, no focal findings.     Psych: Oriented x3. Affect normal.     DATA REVIEWED:  Additional History from Old Records Summarized (2): None  Decision to Obtain Records (1): None  Radiology Tests Summarized or Ordered (1): None  Labs Reviewed or Ordered (1): None  Medicine Test Summarized or Ordered (1): None  Independent Review of EKG or X-RAY(2 each): None    The visit lasted a total of25 minutes face to face with the patient. Over 50% of the time was spent counseling and educating the patient about follow-up.  Regarding diabetes and GERD.    ICriss, am scribing for and in the presence of, Dr. Noel.    Ihao, personally performed the services described in this documentation, as scribed by Criss Gayle in my presence, and it is both accurate and complete.      MEDICATIONS:  Current Outpatient Prescriptions   Medication Sig Dispense Refill     blood glucose test (CONTOUR TEST STRIPS) strips Use 1 each As Directed as needed. Dispense brand per patient's insurance at pharmacy discretion. 100 strip 3     generic lancets (FINGERSTIX LANCETS) Use to check blood sugar daily.  Dispense brand per patient's insurance at pharmacy discretion. 100 each 3     levonorgestrel-ethinyl estradiol (ORSYTHIA) 0.1-20 mg-mcg per tablet Take 1 tablet by mouth daily. 84  tablet 3     omeprazole (PRILOSEC) 20 MG capsule Take 20 mg by mouth daily.  9     ranitidine (ZANTAC) 150 MG tablet Take 1 tablet (150 mg total) by mouth 2 (two) times a day. 60 tablet 11     simvastatin (ZOCOR) 20 MG tablet Take 1 tablet (20 mg total) by mouth daily. 90 tablet 3     sitaGLIPtin-metformin 50-1,000 mg TM24 Take 2 tablets by mouth daily with supper. 60 tablet 11     No current facility-administered medications for this visit.        Total Data Points: 0

## 2021-06-20 NOTE — PROGRESS NOTES
MTM Initial Encounter  Assessment & Plan                                                     1. Type 2 diabetes mellitus without complication, without long-term current use of insulin (H)  Uncontrolled to goal of 7%.  Discussed that her increase in urination is likely due to worsening control of her diabetes as is supported by several sparse glucose readings.  Along with today's A1c, we agreed to start an additional medication and have her follow up with Dr. Noel in a few weeks  - start glipizide XL 10mg daily  - A1c  - BMP    2. Dyslipidemia  On appropriate moderate intensity statin with good LDL control.  - continue current medications    3. Gastroesophageal reflux disease without esophagitis  Ranitidine two times a day controlling symptoms.  -continue current medications.        Follow Up  No Follow-up on file.      Subjective & Objective                                                     Stacey Melendez is a 38 y.o. female coming in for an initial visit for Medication Therapy Management. She was referred to me from Carlos Noel MD    Chief Complaint: medication review    Medication Adherence/Access: no concerns    Diabetes: Janumet 1000-50 two times a day   Stacey Arteaga has four glucose checks from the past 6 weeks, 150-340 at random times of day.  She is eating two meals per day.  She normally takes her medications in the evening with food.  Lab Results   Component Value Date    HGBA1C 7.6 (H) 05/02/2018    HGBA1C 8.3 (H) 01/18/2018    HGBA1C 9.6 (H) 11/17/2017     Lab Results   Component Value Date    GLUF 70 03/25/2013    MICROALBUR 5.00 (H) 04/19/2018    LDLCALC 61 01/04/2018    CREATININE 0.76 04/19/2018     Dyslipidemia: Simvastatin 20mg daily  Lab Results   Component Value Date    CHOL 147 01/04/2018    CHOL 253 (H) 10/06/2017    CHOL 232 (H) 09/21/2017     Lab Results   Component Value Date    HDL 43 (L) 01/04/2018    HDL 48 (L) 10/06/2017    HDL 40 (L) 09/21/2017     Lab Results   Component Value Date     LDLCALC 61 01/04/2018    LDLCALC 141 (H) 10/06/2017    LDLCALC  09/21/2017      Comment:      Invalid, Triglycerides >400     Lab Results   Component Value Date    TRIG 214 (H) 01/04/2018    TRIG 321 (H) 10/06/2017    TRIG 405 (H) 09/21/2017     No components found for: CHOLHDL      GERD: Ranitidine 150mg two times a day   Stacey Arteaga feels the ranitidine is doing a good job controlling her symptoms.    PMH: reviewed in EPIC   Allergies/ADRs: reviewed in EPIC   Alcohol: reviewed in EPIC   Tobacco:   History   Smoking Status     Never Smoker   Smokeless Tobacco     Current User     Types: Chew     Comment: chews betel nut w/tobacco     Today's Vitals:   Vitals:    10/08/18 1305   BP: 132/86   Pulse: 80   Weight: 131 lb 5 oz (59.6 kg)     ----------------    Much or all of the text in this note was generated through the use of Dragon Dictate voice-to-text software. Errors in spelling or words which seem out of context are unintentional. Sound alike errors, in particular, may have escaped editing.    The patient was given a summary of these recommendations as an after visit summary    I spent 30 minutes with this patient today;  All changes were made via collaborative practice agreement with Carlos Noel MD. A copy of the visit note was provided to the patient's provider.     Johnathan Dunham, PharmD, BCACP  MTM Pharmacist at [facility]       Current Outpatient Prescriptions   Medication Sig Dispense Refill     blood glucose test (CONTOUR TEST STRIPS) strips Use 1 each As Directed daily. Dispense brand per patient's insurance at pharmacy discretion. 100 strip 3     generic lancets (FINGERSTIX LANCETS) Use to check blood sugar daily.  Dispense brand per patient's insurance at pharmacy discretion. 100 each 3     levonorgestrel-ethinyl estradiol (ORSYTHIA) 0.1-20 mg-mcg per tablet Take 1 tablet by mouth daily. 84 tablet 3     omeprazole (PRILOSEC) 20 MG capsule Take 20 mg by mouth daily.  9      ranitidine (ZANTAC) 150 MG tablet TAKE 1 PILL BY MOUTH 2 TIMES EVERYDAY FOR STOMACH 60 tablet 3     simvastatin (ZOCOR) 20 MG tablet Take 1 tablet (20 mg total) by mouth daily. 90 tablet 3     sitaGLIPtin-metformin 50-1,000 mg TM24 Take 2 tablets by mouth daily with supper. 60 tablet 11     No current facility-administered medications for this visit.

## 2021-06-20 NOTE — LETTER
Letter by Lynn Devi BSW at      Author: Lynn Devi BSW Service: -- Author Type: --    Filed:  Encounter Date: 8/31/2020 Status: (Other)       Care Plan  About Me:    Patient Name:  Stacey Melendez    YOB: 1980  Age:         40 y.o.   [facility] MRN:    [id number] Telephone Information:  Home Phone [phone]   Mobile [phone]       Address:  [address] [place] [address] Email address:  No e-mail address on record      Emergency Contact(s)  Extended Emergency Contact Information      Name: Lar, Sa    Relation: Spouse      Name: declined, per pt    Relation: Declined          Primary language:  Wendy     needed? Yes   [facility]:  [phone] op. 1  Other communication barriers: Language barrier, Other, Caregiver(learning problem)  Preferred Method of Communication:     Current living arrangement: I live in a private home with family  Mobility Status/ Medical Equipment: Independent    Health Maintenance  Health Maintenance Reviewed: Not assessed    My Access Plan  Medical Emergency 911   Primary Clinic Line Carlos Noel MD - [phone]   24 Hour Appointment Line [phone] or  [phone]) (toll-free)   24 Hour Nurse Line [phone] (toll-free)   Preferred Urgent Care [facility], [phone]   Preferred Hospital [facility]  [phone]   Preferred Pharmacy [facility] - [place], [place] - [address]     Behavioral Health Crisis Line The National Suicide Prevention Lifeline at 1-377.436.9595 or 911             My Care Team Members  Patient Care Team       Relationship Specialty Notifications Start End    Carlos Noel MD PCP - General Family Medicine  7/21/16     Phone: [phone] Fax: [phone]         [address] [place] [address]    Kasey Markham, PharmD Pharmacist Pharmacist  5/16/19     Phone: [phone] Fax: [phone]          [facility] MTM [address] [facility] [address]    Dianne Vasquez MD Assigned PCP   7/13/20     Phone: [phone] Fax: [phone]         [address] [facility] [address]    Lynn Devi BSW Lead Care Coordinator Primary Care - [facility] Admissions 8/31/20     Fax: [phone]         Cherry Moseley CHW Community Health Worker Primary Care - [facility]  8/31/20     Phone: [phone] Fax: [phone]                My Care Plans  Self Management and Treatment Plan  Goals and (Comments)  Goals        General    Financial Wellbeing (pt-stated)     Notes - Note created  8/31/2020  3:21 PM by Lynn Devi BSW    Goal statement: I want to add my baby to health insurance and resolve any outstanding account balance within 3 months of my baby's birth.    Date goal set: 8/31/20  Barriers: Language barrier, high risk pregnancy  Strengths: Appears willing to accept support, has reliable source of transportation (self), good familial support by older children and spouse  Date to achieve by: 12/31/20  Patient expressed understanding of goal: Yes    Action steps to achieve this goal:  1.  I will work with the inpatient financial worker during/following hospitalization   2.  I will contact [facility] with the support of FRW and provide any/all information necessary to add my baby to health insurance.  3.  I will communicate with CHW at outreach.       Healthy Eating (pt-stated)     Notes - Note edited  8/31/2020  3:11 PM by Lynn Devi BSW    Goal statement: I would like to apply for benefits through the [facility] program within the next 30 days.     Date goal set: 8/31/20  Barriers: Language barrier, high risk pregnancy  Strengths: Appears willing to accept support, has reliable source of transportation (self), good familial support by older children and spouse  Date to achieve by: 10/1/20  Patient expressed understanding of goal: Yes     Action  steps to achieve this goal:  1.  I will contact the [facility] Program at [phone] to apply for benefits.  2.  I will provide all necessary paperwork/documentation to assist in this process.  3.  I will communicate with CHW at outreach.     NOTE: Pt was given contact information for local [facility] office on 8/31/20. SW informed her that it is best to call prior to baby being born. She agrees to do so and will ask her older children for assistance if needed.      Other (pt-stated)     Notes - Note created  8/31/2020  3:13 PM by Lynn Devi BSW    Goal statement: I would like assistance obtaining a carseat through [employer] within the next 60 days.    Date goal set: 8/31/20  Barriers: Language barrier, high risk pregnancy  Strengths: Appears willing to accept support, has reliable source of transportation (self), good familial support by older children and spouse  Date to achieve by: 10/31/20  Patient expressed understanding of goal: Yes    Action steps to achieve this goal:  1.  CCC SW submitted carseat referral to [employer] via online form on 8/31/20.  2.  I will answer the phone when [employer] contacts me 4-6 weeks prior to my ELDER (10/14/20) to arrange carseat delivery.  3.  I will communicate with CHW at outreach.               Advance Care Plans/Directives Type:        My Medical and Care Information  Problem List   Patient Active Problem List   Diagnosis   ? Bilateral carpal tunnel syndrome   ? Gastroesophageal reflux disease with esophagitis   ? Learning problem   ? Diabetes (H)   ? Hyperlipidemia LDL goal <100   ? High-risk pregnancy, elderly multigravida, unspecified trimester   ? Pre-existing type 2 diabetes mellitus during pregnancy, antepartum      Current Medications and Allergies:  See printed Medication Report.    Care Coordination Start Date: 8/31/2020   Frequency of Care Coordination:     Form Last Updated: 08/31/2020

## 2021-06-20 NOTE — PROGRESS NOTES
"SUBJECTIVE  Eaqasim Melendez is a 38 y.o. female here for:    Vaginal itchin weeks ago. Hx of yeast infection in 2018 and this feels similar. No discharge. No odor. Denies vaginal or abdominal pain/cramping. No urinary symptoms. No bowel changes. Not sexually active. On OCPs for birth control- very happy with this method. Gets reularly periods. LMP 18.     DM type 2: she needs refills on test strips and lancets today. Has not been checking. Taking metformin and januvia. Last A1C was 7.6.     Not due for pap today.    Due for flu shot.    ROS  Complete 10 point review of systems negative except as noted above in HPI    Reviewed Past Medical History, Medications, Family History and Social History in Epic and up to date with no new changes.    OBJECTIVE  /80  Pulse 84  Temp 98.9  F (37.2  C) (Oral)   Resp 16  Ht 4' 10.25\" (1.48 m)  Wt 129 lb 1.9 oz (58.6 kg)  LMP 2018  SpO2 99%  Breastfeeding? No  BMI 26.75 kg/m2     General: Cooperative, pleasant, in no acute distress  CV: RRR, normal S1/S2, no murmur, rubs, gallops  Resp: No respiratory distress. Clear to auscultation bilaterally. No wheezes, rales, rhonchi  Abd: Soft, non-tender  : normal external female genitalia, no discharge noted    LABS & IMAGES   Results for orders placed or performed in visit on 18   Wet Prep, Vaginal   Result Value Ref Range    Yeast Result No yeast seen No yeast seen    Trichomonas No Trichomonas seen No Trichomonas seen    Clue Cells, Wet Prep No Clue cells seen No Clue cells seen       ASSESSMENT/PLAN:   Stacey was seen today for vaginal itching.    Diagnoses and all orders for this visit:    Vaginal itching: Wet prep negative for yeast, BV; however patient's symptoms convincing for yeast infection, similar to when she had +yeast in January. Will empirically treat with diflucan x 1 dose. Also discussed appropriate hygiene, including avoiding harsh soaps, fragrances in vaginal region, loose fitting " clothing.   -     Wet Prep, Vaginal  -     Chlamydia trachomatis & Neisseria gonorrhoeae, Amplified Detection  -     fluconazole (DIFLUCAN) 150 MG tablet; Take 1 tablet (150 mg total) by mouth once for 1 dose.      Type 2 diabetes mellitus without complication, without long-term current use of insulin (H): Last A1C was elevated, she is on metformin and januvia. Refilled lancets today since she ran out and encouraged to check fasting sugars for a few weeks and return to clinic to see PCP for diabetes follow-up  -     generic lancets (FINGERSTIX LANCETS); Use to check blood sugar daily.  Dispense brand per patient's insurance at pharmacy discretion.  -     blood glucose test (CONTOUR TEST STRIPS) strips; Use 1 each As Directed daily. Dispense brand per patient's insurance at pharmacy discretion.    Need for immunization against influenza  -     Influenza, Seasonal,Quad Inj, 36+ MOS       Visit was completed along with Wendy magallon    Options for treatment and follow-up care were reviewed with the patient. [facility] and/or guardian was engaged and actively involved in the decision making process. [facility] and/or guardian verbalized understanding of the options discussed and was satisfied with the final plan.      Dianne Vasquez MD

## 2021-06-20 NOTE — LETTER
Letter by Lynn Devi BSW at      Author: Lynn Devi BSW Service: -- Author Type: --    Filed:  Encounter Date: 8/31/2020 Status: (Other)       CARE COORDINATION  [facility]  [address]  [place] [address]    August 31, 2020    Stacey Arteaga [address]  [place] [address]      Dear Stacey,    I am a clinic care coordinator  who works with Carlos Noel MD at the [facility]. I wanted to thank you for spending the time to talk with me.  Below is a description of clinic care coordination and how I can further assist you.      The clinic care coordination team is made up of a registered nurse,  and community health worker who understand the health care system. The goal of clinic care coordination is to help you manage your health and improve access to the health care system in the most efficient manner. The team can assist you in meeting your health care goals by providing education, coordinating services, strengthening the communication among your providers and supporting you with any resource needs.    Please feel free to contact Cherry the Community Health Worker at [phone] with any questions or concerns. We are focused on providing you with the highest-quality healthcare experience possible and that all starts with you.     Sincerely,     LLOYD Moreno, LSW    Enclosed: I have enclosed a copy of the Care Plan. This has helpful information and goals that we have talked about. Please keep this in an easy to access place to use as needed.

## 2021-06-21 NOTE — LETTER
Letter by Lynn Devi BSW at      Author: Lynn Devi BSW Service: -- Author Type: --    Filed:  Encounter Date: 2/19/2021 Status: (Other)       CARE COORDINATION  [address], [address]  [place] [address]    February 19, 2021    Stacey [address] [place] [address]      Dear Stacey,  Your Care Team congratulates you on your journey to maintain wellness. This document will help guide you on your journey to maintain a healthy lifestyle.  You can use this to help you overcome any barriers you may encounter.  If you should have any questions or concerns, you can contact the members of your Care Team or contact your Primary Care Clinic for assistance.    Health Maintenance  Health Maintenance Reviewed:      My Access Plan  Medical Emergency 911   Primary Clinic Line Carlos Noel MD - [phone]   24 Hour Appointment Line [phone] or  [phone] ([phone]) (toll-free)   24 Hour Nurse Line [phone]   Preferred Urgent Care     Preferred Hospital     Preferred Pharmacy [facility] - [place], [place] - [address]     Behavioral Health Crisis Line The National Suicide Prevention Lifeline at 1-654.403.9272 or 914     My Care Team Members  Patient Care Team       Relationship Specialty Notifications Start End    Carlos Noel MD PCP - General Family Medicine  7/21/16     Phone: [phone] Fax: [phone]         [address] [facility] [address] [place] [address]    Kasey Markham, PharmD Pharmacist Pharmacist  5/16/19     Phone: [phone] Fax: [phone]         [facility] [address] [facility] [address] [facility] [address]    Dianne Vasquez MD Assigned PCP   7/13/20     Phone: [phone] Fax: [phone]         [address] [facility] [address] [facility] [address]    Denice Tijerina MD Assigned OBGYN Provider   10/23/20     Phone: [phone] Fax: [phone] [address] [place] [address]  [id number]              Goals        Patient Stated    ? COMPLETED: Financial Wellbeing (pt-stated)      Goal statement: I want to add my baby to health insurance and resolve any outstanding account balance within 3 months of my baby's birth.    Date goal set: 8/31/20  Barriers: Language barrier, high risk pregnancy  Strengths: Appears willing to accept support, has reliable source of transportation (self), good familial support by older children and spouse  Date to achieve by: 12/31/20  Patient expressed understanding of goal: Yes    Action steps to achieve this goal:  1.  I understand that my child's insurance is active but I will monitor my incoming mail for the [employer] [place] card.  2.  I understand that my bills are being reprocessed with [employer] and could take up to 30 days.       ? COMPLETED: Healthy Eating (pt-stated)      Goal statement: I would like to apply for benefits through the [facility] program within the next 30 days.     Date goal set: 8/31/20  Barriers: Language barrier, high risk pregnancy  Strengths: Appears willing to accept support, has reliable source of transportation (self), good familial support by older children and spouse  Date to achieve by: 10/1/20  Patient expressed understanding of goal: Yes     Personal Plan:  1.  I am now connected with the [facility] Program at [phone] and will continue to work with them.        ? COMPLETED: Other (pt-stated)      Goal statement: I would like assistance obtaining a carseat through [employer] within the next 60 days.    Date goal set: 8/31/20  Barriers: Language barrier, high risk pregnancy  Strengths: Appears willing to accept support, has reliable source of transportation (self), good familial support by older children and spouse  Date to achieve by: 10/31/20  Patient expressed understanding of goal: Yes    Personal Plan:  1. I have all needed baby items including a car seat.         Other    ? COMPLETED: Monitoring       5/2/18:  Stacey Melendez will check her blood sugars 1-2x/daily before breakfast and before dinner.      NOT A SMART GOAL BEING FOLLOWED BY CCC - DELETED ON 8/31/20      ? COMPLETED: Nutrition      5/2/18: Stacey Melendez will reduce her portions of rice/carbs and start to intermittent fast to help reverse the progression of diabetes.    NOT A SMART GOAL BEING FOLLOWED BY CCC - DELETED ON 8/31/20             Advance Care Plans/Directives Type:      We notice that you do not have an Advance Directive on file. Upon completion of your Health Care Directive, please bring a copy with you to your next office visit.    It has been your Clinic Care Team's pleasure to work with you on your goals.    Regards,  Your Clinic Care Team

## 2021-06-22 NOTE — PROGRESS NOTES
[facility]   Shoulder Initial Evaluation    Patient Name: Stacey Melendez  Date of evaluation: 1/4/2019  Referral Diagnosis: Right anterior shoulder pain  Referring provider: Carlos Noel MD  Visit Diagnosis:     ICD-10-CM    1. Impingement syndrome of right shoulder M75.41    2. Poor posture R29.3    3. Generalized muscle weakness M62.81        Assessment:      Impairments in  pain, posture, joint mobility, strength, sensory function, ADL's  Patient's signs and symptoms are consistent with R shoulder impingement. Pt presents with 2 week, gradual onset of R shoulder pain. She has pain with R shoulder AROM in all planes, pain with MMT but no significant weakness, poor seated posture, and + R shoulder impingement special tests. Pt's pain limits her from lifting, sleeping, reaching, household chores and carrying her grocery or laundry basket..  The POC is dynamic and will be modified on an ongoing basis.  Barriers to achieving goals as noted in the assessment section may affect outcome.  Prognosis to achieve goals is  good   Skilled PT is required to reduce pain and improve function.  Pt. is appropriate for skilled PT intervention as outlined in the Plan of Care (POC).  Pt. is a good candidate for skilled PT services to improve pain levels and function.    Goals:  Pt. will be independent with home exercise program in : 4 weeks  Pt. will have improved quality of sleep: waking less times/night;in other weeks  In Other Weeks: 8 weeks    Pt will: be able to lift 10-20# to carry groceries with less than 3/10 pain in 8 weeks.  Pt will: be able to work without increased pain at the end of her shift in 8 weeks.      Patient's expectations/goals are realistic.    Barriers to Learning or Achieving Goals:  No Barriers.       Plan / Patient Instructions:        Plan of Care:   Authorization / Certification Start Date: 01/03/19  Authorization / Certification End Date: 03/05/19  Authorization / Certification Number of  Visits: 12  Communication with: Referral Source  Patient Related Instruction: Nature of Condition;Treatment plan and rationale;Self Care instruction;Posture;Precautions;Basis of treatment;Next steps;Body mechanics;Expected outcome  Times per Week: 1-2  Number of Weeks: 8  Number of Visits: 10-12  Discharge Planning: when goals are met or pt's progress has plateaued  Therapeutic Exercise: ROM;Stretching;Strengthening  Neuromuscular Reeducation: kinesio tape;posture;TNE;core  Manual Therapy: soft tissue mobilization;myofascial release;muscle energy;joint mobilization;strain counterstrain  Modalities: electrical stimulation;TENS;iontophoresis;ultrasound;cold pack (prn)  Equipment: theraband      POC and pathology of condition were reviewed with patient.  Pt. is in agreement with the Plan of Care  A Home Exercise Program (HEP) was initiated today.  Pt. was instructed in exercises by PT and patient was given a handout with detailed instructions.  Plan for next visit: progress scapular strengthening, thoracic mobility exercises, postural education; posterior capsule stretching or mobilizations if needed for pain  .   Subjective:       Social information:   Occupation:PCA   Work Status:Working full time   Equipment Available: None    History of Present Illness:    Stacey is a 38 y.o. female who presents to therapy today with complaints of R shoulder pain. Date of onset/duration of symptoms is 2018 (about 2 weeks ago). Pt denies any injury or fall during onset. Onset was gradual. Symptoms are intermittent and getting better. Pt has been taking tylenol 2x/day and that has been helpful.  Lifting heavy items at work increases her pain.    Pain Ratin  Pain rating at best: 3  Pain rating at worst: 5  Pain description: aching; pt reports tingling into andrzej hands and knees- that started 2 years ago    Functional limitations are described as occurring with:   Sleep- wakes 1-2x/night  Change sleeping positions  Lift  Yard  and household work  Carry laundry/groceries    Patient reports benefit from:  tylenol       Objective:      Note: Items left blank indicates the item was not performed or not indicated at the time of the evaluation.    Patient Outcome Measures :    Shoulder Pain and Disability Index (SPADI) in %: 31     Scores range from 0-100%, where a score of 0% represents minimal pain and maximal function. The minimal clincically important difference is a score reduction of 10%.    Shoulder Examination  1. Impingement syndrome of right shoulder     2. Poor posture     3. Generalized muscle weakness       Involved side: Right  Posture Observation:      General sitting posture is  poor. Mod forward shoulders, mild forward head, improves with cueing but unable to maintain  Cervical Clearing: Normal    Shoulder/Elbow ROM    Date: 1/4/2019     Shoulder and Elbow ROM ( )   AROM in degrees AROM in degrees AROM in degrees    Right Left Right Left Right Left   Shoulder Flexion (0-180 ) WNL pain WNL       Shoulder Abduction (0-180 ) WNL pain WNL       Shoulder Extension (0-60 )         Shoulder ER (0-90 ) To T4 pain To T4       Shoulder IR (0-70 ) To T9 pain To T9       Shoulder IR/Ext         Elbow Flexion (150 )         Elbow Extension (0 )          PROM in degrees PROM in degrees PROM in degrees    Right Left Right Left Right Left   Shoulder Flexion (0-180 )         Shoulder Abduction (0-180 )         Shoulder Extension (0-60 )         Shoulder ER (0-90 )         Shoulder IR (0-70 )         Elbow Flexion (150 )         Elbow Extension (0 )           Shoulder/Elbow Strength   Date: 1/4/2019     Shoulder/Elbow Strength (/5)  Manual Muscle Test (MMT) MMT MMT MMT    Right Left Right Left Right Left   Shoulder Flexion 5 pain 5       Supraspinatus         Shoulder Abduction 5 pain 5       Shoulder Extension         Shoulder External Rotation 5 pain 5       Shoulder Internal Rotation 5 pain 5       Elbow Flexion 4 pain 5       Elbow Extension  5 pain 5       Other:         Other:           Palpation:  Not tender along R RC tendons or mm belly    Joint Assessment:  Glenohumeral Joint Assessment:Posterior Capsule tightness.    Shoulder Special Tests     Impingement Cluster Right (+/-) Left (+/-) Rotator Cuff Tests Right (+/-) Left (+/-)   Viramontes-Jovanni + - Drop Arm Sign - -   Painful Arc + - Hornblowers     Infraspinatus Test + - ERLS - -   AC Tests Right (+/-) Left (+/-) IRLS - -   Active Compression   Labral Tests Right (+/-) Left (+/-)   Crossbody Adduction   Biceps Load Test II     AC Resisted Extension   Jerk Test     GH Instability Tests Right (+/-) Left (+/-) Nicole Test     Sulcus Sign   Biceps Tests Right (+/-) Left (+/-)   Apprehension   Speed     Relocation   Estela stovall     Other:   Other:     Other:   Other:         Treatment Today    TREATMENT MINUTES COMMENTS   Evaluation 20 -shoulder pain   Self-care/ Home management     Manual therapy     Neuromuscular Re-education     Therapeutic Activity     Therapeutic Exercises 25 -see exercise flow sheet  -educated on POC, diagnosis and HEP  -educated on correct seated posture and importance for reduction of pain    Gait training     Modality__________________                Total     Blank areas are intentional and mean the treatment did not include these items.     PT Evaluation Code: (Please list factors)  Patient History/Comorbidities: see above  Examination: shoulder  Clinical Presentation: stable  Clinical Decision Making: low    Patient History/  Comorbidities Examination  (body structures and functions, activity limitations, and/or participation restrictions) Clinical Presentation Clinical Decision Making (Complexity)   No documented Comorbidities or personal factors 1-2 Elements Stable and/or uncomplicated Low   1-2 documented comorbidities or personal factor 3 Elements Evolving clinical presentation with changing characteristics Moderate   3-4 documented comorbidities or personal factors 4 or  more Unstable and unpredictable High                Barby Cook, PT, DPT  1/4/2019  3:19 PM

## 2021-06-22 NOTE — PROGRESS NOTES
[facility] Daily Progress     Patient Name: Stacey Melendez  Date: 1/9/2019  Visit #: 2/12  auth until 3/5/19  PTA visit #:  1  Referral Diagnosis: right anterior shoulder pain  Referring provider: Carlos Noel MD  Visit Diagnosis:     ICD-10-CM    1. Impingement syndrome of right shoulder M75.41    2. Poor posture R29.3    3. Generalized muscle weakness M62.81          Assessment:     Cues for HEP/posture needed  Complaint with HEP  Pt would benefit with continuing skilled physical therapy treatments    Goal Status: Ongoing  Pt. will be independent with home exercise program in : 4 weeks  Pt. will have improved quality of sleep: waking less times/night;in other weeks  In Other Weeks: 8 weeks    Pt will: be able to lift 10-20# to carry groceries with less than 3/10 pain in 8 weeks.  Pt will: be able to work without increased pain at the end of her shift in 8 weeks.      Plan / Patient Education:     Continue POC  Progress ex as able  Plan for next visit: progress scapular strengthening, thoracic mobility exercises, postural education; posterior capsule stretching or mobilizations if needed for pain  Subjective:     Pain Rating: 3/10 intermittent pain feeling better  Sleep ok can sleep on right side  Ex 1-2x/day  Denies taking pain medication        Objective:   Cues for  HEP/posture needed  Reviewed HEP  Added pulleys, wand ROM ex per flow sheet  Supine right ant shoulder CFM, gentle distraction and shoulder mobs  KT to right shoulder for pain with movement, instructions/precautions discussed   present  Tolerated treatment well      Treatment Today   1/9/2019    TREATMENT MINUTES COMMENTS   Evaluation     Self-care/ Home management     Manual therapy 6 CFM supine to right ant shoulder gentle distraction and mobs   Neuromuscular Re-education     Therapeutic Activity     Therapeutic Exercises 23 Ex per flow sheet KT to right shoulder for pain with movement   Gait training      Modality__________________                Total 29    Blank areas are intentional and mean the treatment did not include these items.       Iwona Rodriguez  1/9/2019

## 2021-06-22 NOTE — PROGRESS NOTES
ASSESSMENT and plan  1. Type 2 diabetes mellitus without complication, without long-term current use of insulin (H)  Patient returned with her sugar meter today.  I reviewed the values she has been checking her sugars in the morning and she checks them in the evening when she does not feel well.  She is typically checking in the evening after she eats so those values are typically high between the 280 and 490 range in the morning her blood sugars are in the 104-170 range.  She has been only taking the glipizide she was under the impression that she only needed to take one medication for diabetes I refilled her Januvia/metformin but she already has some at home I will see her in 2 weeks and draw an A1c at that time when she is taking both medications.  She notes no dizziness no polyuria or polydipsia at this time  - sitaGLIPtin-metformin 50-1,000 mg TM24; Take 2 tablets by mouth daily with supper.  Dispense: 60 tablet; Refill: 11    2. Gastroesophageal reflux disease without esophagitis  The ranitidine is not working she still having epigastric burning and pain and would have her discontinue medication and try omeprazole she is a long history of GERD    3. Vitamin D deficiency    She did not bring in her vitamin D which she is taking over-the-counter.    4. Hyperlipidemia LDL goal <100    She is taking the statin I will recheck her cholesterol when she is fasting.        There are no Patient Instructions on file for this visit.    No orders of the defined types were placed in this encounter.    Medications Discontinued During This Encounter   Medication Reason     sitaGLIPtin-metformin 50-1,000 mg TM24 Reorder       No Follow-up on file.    CHIEF COMPLAINT:  Chief Complaint   Patient presents with     Diabetes       HISTORY OF PRESENT ILLNESS:  Stacey is a 38 y.o. female     Is a patient here with a medical history significant for poorly controlled diabetes, gastritis hyperlipidemia and vitamin D deficiency it has been  "more than 4 months since I have seen her.  She brought her medications in for review she says her blood sugars have been higher.  She states she is taking all her medications daily.  She says sometimes she does forget to take her glipizide.  She states that sometimes at night her blood sugars are very elevated and she feels ill when this occurs.  She currently denies any headaches, visual changes, polydipsia polyuria or weight changes.    REVIEW OF SYSTEMS:     Currently 10 point review of  All other systems are negative.    PFSH:    Works in the store  Is     TOBACCO USE:  Social History     Tobacco Use   Smoking Status Never Smoker   Smokeless Tobacco Current User     Types: Chew   Tobacco Comment    chews betel nut w/tobacco       VITALS:  Vitals:    12/11/18 1300   BP: 116/84   Pulse: 72   Resp: 20   Temp: 98  F (36.7  C)   TempSrc: Oral   Weight: 134 lb (60.8 kg)   Height: 4' 10.25\" (1.48 m)     Wt Readings from Last 3 Encounters:   12/11/18 134 lb (60.8 kg)   10/08/18 131 lb 5 oz (59.6 kg)   09/06/18 129 lb 1.9 oz (58.6 kg)       PHYSICAL EXAM:  Interactive middle-aged female sitting comfortable in exam room in no acute distress  HEENT neck supple mucous membranes moist  Respiratory system clear to auscultation equal breath sounds no wheezes no crackles  CVS regular rate and rhythm no murmurs rubs gallops appreciated  Abdomen soft tenderness noted in the epigastric region with deep palpation.  Bowel sounds are present.    DATA REVIEWED:  Additional History from Old Records Summarized (2): 2  Reviewed last note with  regarding vaginitis she does not have the symptom  Decision to Obtain Records (1): 0  Radiology Tests Summarized or Ordered (1): 0  Labs Reviewed or Ordered (1): 1    Last A1c above 9  Medicine Test Summarized or Ordered (1): 0  Independent Review of EKG or X-RAY(2 each): 0    The visit lasted a total of 22 minutes face to face with the patient. Over 50% of the time was spent " counseling and educating the patient about   Hyperlipidemia, and gastritis diabetes,.    MEDICATIONS:  Current Outpatient Medications   Medication Sig Dispense Refill     blood glucose test (CONTOUR TEST STRIPS) strips Use 1 each As Directed daily. Dispense brand per patient's insurance at pharmacy discretion. 100 strip 3     generic lancets (FINGERSTIX LANCETS) Use to check blood sugar daily.  Dispense brand per patient's insurance at pharmacy discretion. 100 each 3     glipiZIDE (GLUCOTROL XL) 10 MG 24 hr tablet Take 1 tablet (10 mg total) by mouth daily. 90 tablet 3     ranitidine (ZANTAC) 150 MG tablet TAKE 1 PILL BY MOUTH 2 TIMES EVERYDAY FOR STOMACH 60 tablet 3     simvastatin (ZOCOR) 20 MG tablet Take 1 tablet (20 mg total) by mouth daily. 90 tablet 3     levonorgestrel-ethinyl estradiol (ORSYTHIA) 0.1-20 mg-mcg per tablet Take 1 tablet by mouth daily. 84 tablet 3     omeprazole (PRILOSEC) 20 MG capsule Take 1 capsule (20 mg total) by mouth daily. 30 capsule 11     sitaGLIPtin-metformin 50-1,000 mg TM24 Take 2 tablets by mouth daily with supper. 60 tablet 11     No current facility-administered medications for this visit.         Please note that this clinical encounter uses voice recognition software, there may be typographical errors present

## 2021-06-22 NOTE — PROGRESS NOTES
ASSESSMENT and plan   1. Hyperlipidemia LDL goal <100  Patient is on a statin.  No significant side effects noted however I will check LFTs since she is having myalgia see below checking lipid profile today.  - Lipid Cascade    2. Gastroesophageal reflux disease without esophagitis    She did not bring her medications in for review however she reports that her symptoms are improved she is been on omeprazole for about 3 weeks.    3. Type 2 diabetes mellitus without complication, without long-term current use of insulin (H)    Rechecking A1c today.  Blood sugars are in the 160s-150s range.  She was unable to  her combination of Januvia and Metformin is only taking the glipizide I prescribed Januvia separately and metformin separately.    4. Myalgia    Complaining of pain in her joints specifically in the joints of the hand and her forearms.  Checking LFTs today.  This is a relatively new symptom which was not present on the last visit  - Comprehensive Metabolic Panel    5. Right anterior shoulder pain    Complains of shoulder pain which is present last year has tenderness in the anterior portion of the right shoulder rotator cuff strength is preserved I will send her to physical therapy today did a plain radiograph which showed no evidence of DJD.  - XR Shoulder Right 2 or More VWS; Future  - Ambulatory referral to Adult PT- External              There are no Patient Instructions on file for this visit.    Orders Placed This Encounter   Procedures     XR Shoulder Right 2 or More VWS     Standing Status:   Future     Number of Occurrences:   1     Standing Expiration Date:   12/26/2019     Order Specific Question:   Is the patient pregnant?     Answer:   No     Order Specific Question:   Can the procedure be changed per Radiologist protocol?     Answer:   Yes     Comprehensive Metabolic Panel     Lipid Cascade     Order Specific Question:   Fasting is required?     Answer:   Yes     Ambulatory referral to Adult  "PT- External     Referral Priority:   Routine     Referral Type:   Physical Therapy     Referral Reason:   Evaluation and Treatment     Requested Specialty:   Physical Therapy     Number of Visits Requested:   1     Medications Discontinued During This Encounter   Medication Reason     sitaGLIPtin-metformin 50-1,000 mg TM24 Alternate therapy       No Follow-up on file.    CHIEF COMPLAINT:  Chief Complaint   Patient presents with     Diabetes     Shoulder Pain     right       HISTORY OF PRESENT ILLNESS:  Stacey is a 38 y.o. female     Who is here today to follow-up for cholesterol and diabetes.  She is also been experiencing cramping pain in her arms and legs  And her right shoulder.  The cramping pains been present for more than a month.  She says she has had right shoulder pain again this pain is different than the cramping pain is been present about a year denies any injuries to the site says she can do her housework.  However shoulder pain is present almost every day.  She describes it being moderate in intensity with no relieving or aggravating factors.  She says she has been checking her blood sugars but was unable to  one medication.  She states that she does not know if that affected her blood sugars.  She also mentions that her stomach burning has decreased.    REVIEW OF SYSTEMS:     Musculoskeletal complains of cramping in her arms and legs and in her right shoulder  GI decreased abdominal burning noted  All other 10 point review of  All other systems are negative.    PFSH:    Homemaker    TOBACCO USE:  Social History     Tobacco Use   Smoking Status Never Smoker   Smokeless Tobacco Current User     Types: Chew   Tobacco Comment    chews betel nut w/tobacco       VITALS:  Vitals:    12/26/18 0812   BP: 122/78   Pulse: 70   Resp: 18   Temp: 97.8  F (36.6  C)   TempSrc: Oral   SpO2: 98%   Weight: 131 lb (59.4 kg)   Height: 4' 10.25\" (1.48 m)     Wt Readings from Last 3 Encounters:   12/26/18 131 lb (59.4 " kg)   12/11/18 134 lb (60.8 kg)   10/08/18 131 lb 5 oz (59.6 kg)       PHYSICAL EXAM:  Interactive woman sitting comfortably in exam room in no acute moist  Musculoskeletal system tenderness elicited when I palpate the anterior portion of the right shoulder.  She has some tenderness in the right forearm.  There is no tenderness noted when I palpate MCP joints of both hands.  She has no tenderness when I palpate her quadriceps bilaterally.  Neuro cranial nerves II to XII intact gait is normal reflexes are brisk motor tone is normal and lower extremities  CVS regular rate and rhythm no murmurs rubs gallops appreciated  GI the abdomen soft no focal tenderness bowel sounds present      DATA REVIEWED:  Additional History from Old Records Summarized (2): 0  Decision to Obtain Records (1): 0  Radiology Tests Summarized or Ordered (1): 1  Labs Reviewed or Ordered (1): 1  Medicine Test Summarized or Ordered (1): 0  Independent Review of EKG or X-RAY(2 each): 2    Independently reviewed the x-ray today and found no evidence or evidence of a fracture.    The visit lasted a total of 25 minutes face to face with the patient. Over 50% of the time was spent counseling and educating the patient about   Diabetes, myalgia, shoulder pain, GERD, hyperlipidemia..    MEDICATIONS:  Current Outpatient Medications   Medication Sig Dispense Refill     blood glucose test (CONTOUR TEST STRIPS) strips Use 1 each As Directed daily. Dispense brand per patient's insurance at pharmacy discretion. 100 strip 3     generic lancets (FINGERSTIX LANCETS) Use to check blood sugar daily.  Dispense brand per patient's insurance at pharmacy discretion. 100 each 3     glipiZIDE (GLUCOTROL XL) 10 MG 24 hr tablet Take 1 tablet (10 mg total) by mouth daily. 90 tablet 3     levonorgestrel-ethinyl estradiol (ORSYTHIA) 0.1-20 mg-mcg per tablet Take 1 tablet by mouth daily. 84 tablet 3     metFORMIN (GLUCOPHAGE) 1000 MG tablet Take 1 tablet (1,000 mg total) by mouth  2 (two) times a day with meals. 180 tablet 3     omeprazole (PRILOSEC) 20 MG capsule Take 1 capsule (20 mg total) by mouth daily. 30 capsule 11     ranitidine (ZANTAC) 150 MG tablet TAKE 1 PILL BY MOUTH 2 TIMES EVERYDAY FOR STOMACH 180 tablet 3     simvastatin (ZOCOR) 20 MG tablet Take 1 tablet (20 mg total) by mouth daily. 90 tablet 3     sitaGLIPtin (JANUVIA) 100 MG tablet Take 1 tablet (100 mg total) by mouth daily. With or without food 30 tablet 5     No current facility-administered medications for this visit.      Data points 4

## 2021-06-23 ENCOUNTER — RECORDS - HEALTHEAST (OUTPATIENT)
Dept: FAMILY MEDICINE | Facility: CLINIC | Age: 41
End: 2021-06-23

## 2021-06-23 NOTE — PROGRESS NOTES
[facility] Daily Progress     Patient Name: Stacey Melendez  Date: 2019  Visit #:  auth until 3/5/19  PTA visit #:  3  Referral Diagnosis: right anterior shoulder pain  Referring provider: Carlos Noel MD  Visit Diagnosis:     ICD-10-CM    1. Impingement syndrome of right shoulder M75.41    2. Poor posture R29.3    3. Generalized muscle weakness M62.81          Assessment:     Cues for HEP/posture needed  Complaint with HEP  Pt would benefit with continuing skilled physical therapy treatments and is appropriate to continue with skilled PT.    Goal Status: progressing towards  Pt. will be independent with home exercise program in : 4 weeks  Pt. will have improved quality of sleep: waking less times/night;in other weeks  In Other Weeks: 8 weeks    Pt will: be able to lift 10-20# to carry groceries with less than 3/10 pain in 8 weeks.  Pt will: be able to work without increased pain at the end of her shift in 8 weeks.      Plan / Patient Education:     Continue POC  Progress ex as able  Plan for next visit: progress scapular strengthening, postural education; posterior capsule stretching, review prone exercises    Subjective:     Pain Ratin/10 intermittent pain feeling better  Pt has been doing well. Pt reports 50% improvement since starting PT. Has been able to lay on her R side but still wakes 2-3 times per night. Lifting lighter things has improved.  Ex 1-2x/day      Objective:   Cues for  HEP/posture needed  Tolerated treatment well    Shoulder AROM:  Flexion: R WNL with ERP, L WNL  Abd: RWNL with ERP, L WNL  Functional IR: R To T10- less pain than previously L To T10  Functional ER: R To C7 with mild pain, L to C7      Treatment Today   2019  TREATMENT MINUTES COMMENTS   Evaluation     Self-care/ Home management     Manual therapy  CFM supine to right ant shoulder gentle distraction and mobs   Neuromuscular Re-education     Therapeutic Activity     Therapeutic Exercises 24 Ex per flow  sheet   Educated to increase to 2-3 sets of 10 reps of each exercise to increase strength   Gait training     Modality__________________ 0- not today US to right shoulder 1 MHz 20% at 1.0wts/cm2              Total 24    Blank areas are intentional and mean the treatment did not include these items.       Barby Cook, PT, DPT  1/31/2019

## 2021-06-23 NOTE — PROGRESS NOTES
[facility] Daily Progress     Patient Name: Stacey Melendez  Date: 2019  Visit #:  auth until 3/5/19  PTA visit #:  3  Referral Diagnosis: right anterior shoulder pain  Referring provider: Carlos Noel MD  Visit Diagnosis:     ICD-10-CM    1. Impingement syndrome of right shoulder M75.41    2. Poor posture R29.3    3. Generalized muscle weakness M62.81          Assessment:   Reports feeling better decreased pain  Cues for HEP/posture needed  Complaint with HEP  Pt would benefit with continuing skilled physical therapy treatments    Goal Status: progressing towards  Pt. will be independent with home exercise program in : 4 weeks  Pt. will have improved quality of sleep: waking less times/night;in other weeks  In Other Weeks: 8 weeks    Pt will: be able to lift 10-20# to carry groceries with less than 3/10 pain in 8 weeks.  Pt will: be able to work without increased pain at the end of her shift in 8 weeks.      Plan / Patient Education:     Continue POC  Progress ex as able  Plan for next visit: progress scapular strengthening, thoracic mobility exercises, postural education; posterior capsule stretching   Subjective:     Pain Ratin/10 intermittent pain feeling better, reports relief with KT  Sleep ok can sleep on right side  Ex 1-2x/day  Denies taking pain medication        Objective:   Cues for  HEP/posture needed  Reviewed  pulleys, wand ROM ex per flow sheet  Added wall push-ups, standing ER with OTB, prone Is,WS and horzional ABD  Added US to right shoulder 1MHz 20% at 1.0wts/cm2  Held KT itchy   present  Tolerated treatment well      Treatment Today   2019    TREATMENT MINUTES COMMENTS   Evaluation     Self-care/ Home management     Manual therapy  CFM supine to right ant shoulder gentle distraction and mobs   Neuromuscular Re-education     Therapeutic Activity     Therapeutic Exercises 14 Ex per flow sheet held KT itchy   Gait training     Modality__________________ 10+ 3  min set up US to right shoulder 1 MHz 20% at 1.0wts/cm2              Total 27    Blank areas are intentional and mean the treatment did not include these items.       Iwona Rodriguez  1/23/2019

## 2021-06-23 NOTE — PROGRESS NOTES
[facility] Daily Progress     Patient Name: Stacey Melendez  Date: 1/16/2019  Visit #: 3/12  auth until 3/5/19  PTA visit #:  2  Referral Diagnosis: right anterior shoulder pain  Referring provider: Carlos Noel MD  Visit Diagnosis:     ICD-10-CM    1. Impingement syndrome of right shoulder M75.41    2. Poor posture R29.3    3. Generalized muscle weakness M62.81          Assessment:   Reports feeling better decreased pain  Cues for HEP/posture needed  Complaint with HEP  Pt would benefit with continuing skilled physical therapy treatments    Goal Status: progressing towards  Pt. will be independent with home exercise program in : 4 weeks  Pt. will have improved quality of sleep: waking less times/night;in other weeks  In Other Weeks: 8 weeks    Pt will: be able to lift 10-20# to carry groceries with less than 3/10 pain in 8 weeks.  Pt will: be able to work without increased pain at the end of her shift in 8 weeks.      Plan / Patient Education:     Continue POC  Progress ex as able  Plan for next visit: progress scapular strengthening, thoracic mobility exercises, postural education; posterior capsule stretching   Subjective:     Pain Rating: 3/10 intermittent pain feeling better, reports relief with KT  Sleep ok can sleep on right side  Ex 1-2x/day  Denies taking pain medication        Objective:   Cues for  HEP/posture needed  Reviewed  pulleys, wand ROM ex per flow sheet  Added wall push-ups, standing ER with OTB, prone Is,WS and horzional ABD  Supine right ant shoulder CFM, gentle distraction and shoulder mobs  Reapplied skin clear   present  Tolerated treatment well      Treatment Today   1/16/2019    TREATMENT MINUTES COMMENTS   Evaluation     Self-care/ Home management     Manual therapy 6 CFM supine to right ant shoulder gentle distraction and mobs   Neuromuscular Re-education     Therapeutic Activity     Therapeutic Exercises 23 Ex per flow sheet KT to right shoulder for pain with  movement   Gait training     Modality__________________                Total 29    Blank areas are intentional and mean the treatment did not include these items.       Iwona Rodriguez  1/16/2019

## 2021-06-23 NOTE — PROGRESS NOTES
[facility] Daily Progress / Discharge Summary    Patient Name: Stacey Melendez  Date: 2/4/2019  Visit #: 6  PTA visit #:  na  Referral Diagnosis: right anterior shoulder pain  Referring provider: Carlos Noel MD  Visit Diagnosis:     ICD-10-CM    1. Impingement syndrome of right shoulder M75.41    2. Poor posture R29.3    3. Generalized muscle weakness M62.81          Assessment:   HEP/POC compliance is  good .  Response to Intervention Pt reports 70% improvement since starting PT. Reports only mild pain and tolerates increased activity with her R arm/shoulder. She has full R shoulder AROM with less pain and only mild pain/discomfort with her strength assessment.  Patient has benefitted from skilled physical therapy and is making steady progress toward functional goals.    Goal Status:  Pt. will be independent with home exercise program in : 4 weeks;Met  Pt. will have improved quality of sleep: waking less times/night;in other weeks;Met  In Other Weeks: 8 weeks    Pt will: be able to lift 10-20# to carry groceries with less than 3/10 pain in 8 weeks. (progressing towards)  Pt will: be able to work without increased pain at the end of her shift in 8 weeks. (MET)      Plan / Patient Education:     Initial plan of care has been completed. Patient has responded appropriately to skilled PT intervention.  The patient met goals and has demonstrated understanding of/independence in the home program for self-care and progression to next steps.  No further therapy is required at this time.  The patient will initiate contact if questions or concerns arise.    Subjective:     Pain Rating: 3  Pt reports she is doing well. The pain is much better by the end of her shift at work. Lifting a milk bottle can still cause tingling but lighter objects are okay. She reports 70% improvement since starting PT.    Patient Outcome Measures  Shoulder Pain and Disability Index (SPADI) in %: 19     Scores range from 0-100%, where a  score of 0% represents minimal pain and maximal function. The minimal clincically important difference is a score reduction of 10%.      Objective:     Shoulder/Elbow ROM    Date: 1/4/2019 2/4/2019    Shoulder and Elbow ROM ( )   AROM in degrees AROM in degrees AROM in degrees    Right Left Right Left Right Left   Shoulder Flexion (0-180 ) WNL pain WNL WNL with mild pain WNL     Shoulder Abduction (0-180 ) WNL pain WNL WNL with mild pain WNL     Shoulder Extension (0-60 )         Shoulder ER (0-90 ) To T4 pain To T4 To T4 To T4     Shoulder IR (0-70 ) To T9 pain To T9 To T9 with mild pain To T9     Shoulder IR/Ext         Elbow Flexion (150 )         Elbow Extension (0 )          PROM in degrees PROM in degrees PROM in degrees    Right Left Right Left Right Left   Shoulder Flexion (0-180 )         Shoulder Abduction (0-180 )         Shoulder Extension (0-60 )         Shoulder ER (0-90 )         Shoulder IR (0-70 )         Elbow Flexion (150 )         Elbow Extension (0 )           Shoulder/Elbow Strength   Date: 1/4/2019 2/4/2019    Shoulder/Elbow Strength (/5)  Manual Muscle Test (MMT) MMT MMT MMT    Right Left Right Left Right Left   Shoulder Flexion 5 pain 5 5 mild pain 5     Supraspinatus         Shoulder Abduction 5 pain 5 5 mild pain 5     Shoulder Extension         Shoulder External Rotation 5 pain 5 5 5     Shoulder Internal Rotation 5 pain 5 5 5     Elbow Flexion 4 pain 5 5 5     Elbow Extension 5 pain 5 5 5     Other:         Other:             Treatment Today  TREATMENT MINUTES COMMENTS   Evaluation     Self-care/ Home management     Manual therapy     Neuromuscular Re-education     Therapeutic Activity     Therapeutic Exercises 25 -see exercise flow sheet for date completed  -educated on discharge- continue with HEP daily until pain fully resolves then decrease to 3x/week to maintain improvements   Gait training     Modality__________________                Total 25    Blank areas are intentional and  mean the treatment did not include these items.     Barby Anderson, PT, DPT  2/4/2019

## 2021-06-23 NOTE — TELEPHONE ENCOUNTER
Refill Approved    Rx renewed per Medication Renewal Policy. Medication was last renewed on 1/18/18.    Miryam Mora, [facility] Triage/Med Refill 1/28/2019     Requested Prescriptions   Pending Prescriptions Disp Refills     simvastatin (ZOCOR) 20 MG tablet [Pharmacy Med Name: SIMVASTATIN 20 MG TABLET 20 TAB] 30 tablet 10     Sig: TAKE 1 PILL BY MOUTH EVERYDAY FOR CHOLESTEROL    Statins Refill Protocol (Hmg CoA Reductase Inhibitors) Passed - 1/27/2019 11:31 AM       Passed - PCP or prescribing provider visit in past 12 months     Last office visit with prescriber/PCP: 12/26/2018 Carlos Noel MD OR same dept: 12/26/2018 Carlos Noel MD OR same specialty: 12/26/2018 Carlos Noel MD  Last physical: 9/21/2017 Last MTM visit: Visit date not found   Next visit within 3 mo: Visit date not found  Next physical within 3 mo: Visit date not found  Prescriber OR PCP: Carlos Noel MD  Last diagnosis associated with med order: There are no diagnoses linked to this encounter.  If protocol passes may refill for 12 months if within 3 months of last provider visit (or a total of 15 months).

## 2021-06-24 NOTE — TELEPHONE ENCOUNTER
Central PA team  [phone]  Pool: HE PA MED ([id number])          PA has been initiated.       PA form completed and faxed insurance via Cover My Meds     Key:  [id number]   Medication:   JANUVIA  Insurance:  [employer] Blue [facility] [employer] [place]             Response will be received via fax and may take up to 5-10 business days depending on plan

## 2021-06-24 NOTE — PROGRESS NOTES
ASSESSMENT AND PLAN:  1. Type 2 diabetes mellitus without complication, without long-term current use of insulin (H) A1c decreasing to 8.7.  She did receive 1 month of Januvia which will take and then I will put her on Invokana and metformin separately after April 15. blood glucose test (CONTOUR TEST STRIPS) strips    generic lancets (FINGERSTIX LANCETS)   2. Hyperlipidemia LDL goal <100 taking atorvastatin check lipid panel at next visit    3. Dysuria complains of burning while urinating and some discharge.  Urinalysis done today. Urinalysis-UC if Indicated    Culture, Urine   4. Gastroesophageal reflux disease without esophagitis continue ranitidine some burning noted her omeprazole previously had not been covered.    5. Acute cystitis with hematuria confirmed by UA.  Ciprofloxacin started for the patient            Orders Placed This Encounter   Procedures     Urinalysis-UC if Indicated     Medications Discontinued During This Encounter   Medication Reason     blood glucose test (CONTOUR TEST STRIPS) strips Reorder     generic lancets (FINGERSTIX LANCETS) Reorder       Return in about 2 months (around 5/12/2019).    CHIEF COMPLAINT:  Chief Complaint   Patient presents with     Diabetes       HISTORY OF PRESENT ILLNESS:  Stacey is a 38 y.o. female with diabetes, hyperlipidemia, vitamin D deficiency, and GERD, who presents to the clinic today for follow up on diabetes. Stacey is present with a Sprig . Her insurance has denied coverage of Januvia. They do cover Janumet which she was on previously. The patient continues on metformin and glipizide. She does not exercise. Stacey last saw our clinical pharmacist five days ago at which time consideration of glipizide twice a day or adding additional medications was discussed for better control of her blood sugars.    She is still having burning abdominal pain. The patient also reports pain over her bladder and burning pain with urination for the past 2-3 weeks. This  "has been accompanied by vaginal itching and associated back pain. She denies any gross hematuria, vaginal discharge, genital rash or lesions, nausea, or emesis.    REVIEW OF SYSTEMS:   GI: Burning abdominal pain and pain over her bladder. No nausea or emesis.  : Dysuria and vaginal itching. No gross hematuria, vaginal discharge, or genital rash/lesions.  Back: Back pain.  All other systems are negative.    PFSH:  Reviewed as below.     TOBACCO USE:  Social History     Tobacco Use   Smoking Status Never Smoker   Smokeless Tobacco Current User     Types: Chew   Tobacco Comment    chews betel nut w/tobacco       VITALS:  Vitals:    03/12/19 0844   BP: 120/76   Pulse: 81   Resp: 18   Temp: 98.5  F (36.9  C)   TempSrc: Oral   SpO2: 99%   Weight: 137 lb 6 oz (62.3 kg)   Height: 4' 10.25\" (1.48 m)     Wt Readings from Last 3 Encounters:   03/12/19 137 lb 6 oz (62.3 kg)   03/07/19 134 lb (60.8 kg)   02/26/19 134 lb 8 oz (61 kg)     Body mass index is 28.47 kg/m .    PHYSICAL EXAM:  General: Alert, cooperative, no distress, appears stated age  HEETN: Normocephalic, without obvious abnormality, atraumatic, moist mucous membranes  Eyes: PERRL, conjunctiva/cornea clear, EOM's intact  Lungs: Clear to auscultation bilaterally, respirations unlabored  Heart: Regular rate and rhythm, S1 and S2 normal, no murmur, rub, or gallop  Back: No CVA tenderness bilaterally  Abdomen: Soft, bowel sounds active all four quadrants, no masses, no organomegaly, tender to palpation in the low abdomen over the bladder  Neurologic:  A & O x 3.  No tremor, no focal findings.  Normal gait.     DATA REVIEWED:  Additional History from Old Records Summarized (2): Reviewed Kasey Markham's 03/07/2019 note regarding medication management.  Decision to Obtain Records (1): none  Radiology Tests Summarized or Ordered (1): none  Labs Reviewed or Ordered (1): UA ordered today with UC if indicated.  Medicine Test Summarized or Ordered (1): none  Independent " Review of EKG or X-RAY(2 each): none    I, Lady Eaton, am scribing for and in the presence of, Dr. Noel.    I, Dr. Noel, personally performed the services described in this documentation, as scribed by Lady Eaton in my presence, and it is both accurate and complete.      MEDICATIONS:  Current Outpatient Medications   Medication Sig Dispense Refill     glipiZIDE (GLUCOTROL XL) 10 MG 24 hr tablet Take 1 tablet (10 mg total) by mouth daily. 90 tablet 3     metFORMIN (GLUCOPHAGE) 1000 MG tablet Take  One in  The morning and 1 and half tablets at night 180 tablet 3     ranitidine (ZANTAC) 150 MG tablet TAKE 1 PILL BY MOUTH 2 TIMES EVERYDAY FOR STOMACH 180 tablet 3     simvastatin (ZOCOR) 20 MG tablet TAKE 1 PILL BY MOUTH EVERYDAY FOR CHOLESTEROL 30 tablet 10     blood glucose test (CONTOUR TEST STRIPS) strips Use 1 each As Directed daily. Dispense brand per patient's insurance at pharmacy discretion. 100 strip 3     generic lancets (FINGERSTIX LANCETS) Use to check blood sugar daily.  Dispense brand per patient's insurance at pharmacy discretion. 100 each 3     levonorgestrel-ethinyl estradiol (ORSYTHIA) 0.1-20 mg-mcg per tablet Take 1 tablet by mouth daily. 84 tablet 3     lisinopril (ZESTRIL) 2.5 MG tablet Take 1 tablet (2.5 mg total) by mouth daily. 90 tablet 1     omeprazole (PRILOSEC) 20 MG capsule Take 1 capsule (20 mg total) by mouth daily. 30 capsule 11     No current facility-administered medications for this visit.        Total Data Points: 3    Please note that this clinical encounter uses voice recognition software, there may be typographical errors present

## 2021-06-24 NOTE — TELEPHONE ENCOUNTER
This was denied by patient's plan but we never received a denial letter.  Called [employer] to have them re-fax denial letter with reasoning.

## 2021-06-24 NOTE — TELEPHONE ENCOUNTER
Contacted patient with  on the line to advise Dr. Noel states he sent Rx to pharmacy for abnormal results. No further action needed.

## 2021-06-24 NOTE — PROGRESS NOTES
ASSESSMENT AND PLAN:  1. Type 2 diabetes mellitus without complication, without long-term current use of insulin (H)  Blood sugars are elevated ranging from lows of 120-300 and 10 in the morning.  She apparently has access to Januvia but did not bring that in review her medications with her within 2 weeks.  She will take the Januvia, I have raised her metformin dose to 2500 mg/day and she will take the glyburide.  I am rechecking an A1c today.  - generic lancets (FINGERSTIX LANCETS); Use to check blood sugar daily.  Dispense brand per patient's insurance at pharmacy discretion.  Dispense: 100 each; Refill: 3  - Glycosylated Hemoglobin A1c    2. Gastroesophageal reflux disease without esophagitis  Continues to take both ranitidine and omeprazole however she is been take the omeprazole after meals have asked her to take in the morning to avoid symptoms.  - ranitidine (ZANTAC) 150 MG tablet; TAKE 1 PILL BY MOUTH 2 TIMES EVERYDAY FOR STOMACH  Dispense: 180 tablet; Refill: 3    3. Hyperlipidemia LDL goal <100  Taking a statin the next 2 months.  Family history of hyperlipidemia and hypertriglyceridemia is present            Orders Placed This Encounter   Procedures     Glycosylated Hemoglobin A1c     Medications Discontinued During This Encounter   Medication Reason     generic lancets (FINGERSTIX LANCETS) Reorder     metFORMIN (GLUCOPHAGE) 1000 MG tablet Reorder     omeprazole (PRILOSEC) 20 MG capsule Reorder     ranitidine (ZANTAC) 150 MG tablet Reorder     sitaGLIPtin (JANUVIA) 100 MG tablet Reorder     metFORMIN (GLUCOPHAGE) 1000 MG tablet        No Follow-up on file.    CHIEF COMPLAINT:  Chief Complaint   Patient presents with     Diabetes       HISTORY OF PRESENT ILLNESS:  Stacey is a 38 y.o. female with diabetes, hyperlipidemia, vitamin D deficiency, and GERD, who presents to the clinic today for follow up on diabetes. Stacey is present with a Wendy . Her last hemoglobin A1c in October 2018. Her blood sugars  "fluctuate, ranging from as low as 112 to about 300. She sometimes forgets to take her metformin in the morning, and then takes 2 tablets in the evening. This occurs once or twice a week.    The patient continues to have intermittent burning abdominal pain, on omeprazole though she takes it after eating and also sometimes forgets to take it at all. It sometimes feels like hunger pangs, despite not being hungry. It does not radiate up her throat into her neck. She has tried weaning off of omeprazole in the past, but has had immediate recurrence of symptoms.    Stacey reports forgetting a medication at home that is a green pill, taken twice a day. It is unclear which medication this is upon reviewing her medication list.    She denies any chest pain, headaches.    REVIEW OF SYSTEMS:   CV: No chest pain.  GI: Intermittent burning abdominal pain.   Neuro: No headaches.  All other systems are negative.    PFSH:  Reviewed as below.     TOBACCO USE:  Social History     Tobacco Use   Smoking Status Never Smoker   Smokeless Tobacco Current User     Types: Chew   Tobacco Comment    chews betel nut w/tobacco       VITALS:  Vitals:    02/26/19 1439   BP: 130/86   Pulse: 73   Resp: 18   Temp: 97.9  F (36.6  C)   TempSrc: Oral   SpO2: 98%   Weight: 134 lb 8 oz (61 kg)   Height: 4' 10.25\" (1.48 m)     Wt Readings from Last 3 Encounters:   02/26/19 134 lb 8 oz (61 kg)   12/26/18 131 lb (59.4 kg)   12/11/18 134 lb (60.8 kg)     Body mass index is 27.87 kg/m .    PHYSICAL EXAM:  General: Alert, cooperative, no distress, appears stated age  HEENT: Normocephalic, without obvious abnormality, atraumatic, moist mucous membranes, chronic left cervical lymph node enlargement   Eyes: PERRL, conjunctiva/cornea clear, EOM's intact  Lungs: Clear to auscultation bilaterally, respirations unlabored  Heart: Regular rate and rhythm, S1 and S2 normal, no murmur, rub, or gallop  Neurologic:  A & O x 3.  No tremor, no focal findings.  Normal gait. "     DATA REVIEWED:  Additional History from Old Records Summarized (2): none  Decision to Obtain Records (1): none  Radiology Tests Summarized or Ordered (1): none  Labs Reviewed or Ordered (1): 10/08/2018 hemoglobin A1c was 9.0. Hemoglobin A1c ordered today.   Medicine Test Summarized or Ordered (1): none  Independent Review of EKG or X-RAY(2 each): none    I, Lady Eaton, am scribing for and in the presence of, Dr. Noel.    I, Dr. Noel, personally performed the services described in this documentation, as scribed by Lady Eatno in my presence, and it is both accurate and complete.      MEDICATIONS:  Current Outpatient Medications   Medication Sig Dispense Refill     blood glucose test (CONTOUR TEST STRIPS) strips Use 1 each As Directed daily. Dispense brand per patient's insurance at pharmacy discretion. 100 strip 3     generic lancets (FINGERSTIX LANCETS) Use to check blood sugar daily.  Dispense brand per patient's insurance at pharmacy discretion. 100 each 3     glipiZIDE (GLUCOTROL XL) 10 MG 24 hr tablet Take 1 tablet (10 mg total) by mouth daily. 90 tablet 3     levonorgestrel-ethinyl estradiol (ORSYTHIA) 0.1-20 mg-mcg per tablet Take 1 tablet by mouth daily. 84 tablet 3     metFORMIN (GLUCOPHAGE) 1000 MG tablet Take  One in  The morning and 1 and half tablets at night 180 tablet 3     omeprazole (PRILOSEC) 20 MG capsule Take 1 capsule (20 mg total) by mouth daily. 30 capsule 11     ranitidine (ZANTAC) 150 MG tablet TAKE 1 PILL BY MOUTH 2 TIMES EVERYDAY FOR STOMACH 180 tablet 3     simvastatin (ZOCOR) 20 MG tablet TAKE 1 PILL BY MOUTH EVERYDAY FOR CHOLESTEROL 30 tablet 10     sitaGLIPtin (JANUVIA) 100 MG tablet Take 1 tablet (100 mg total) by mouth daily. With or without food 30 tablet 5     No current facility-administered medications for this visit.      Total amount time spent in today's visit was 25 minutes greater than 50% of this time was spent discussing diabetes and chronic reflux with the  patient  Total Data Points: 1    Please note that this clinical encounter uses voice recognition software, there may be typographical errors present

## 2021-06-24 NOTE — TELEPHONE ENCOUNTER
[facility] PA team  [phone]  Pool: [facility] PA MED ([id number])          PA has been initiated.       PA form completed and faxed insurance via Cover My Meds     Key:  [id number] - Rx #: [id number]     Medication:  Invokana 300MG OR TABS    Insurance:  [facility] Medicaid        Response will be received via fax and may take up to 5-10 business days depending on plan

## 2021-06-24 NOTE — PROGRESS NOTES
MTM Initial Encounter  Assessment & Plan                                                     1. Type 2 diabetes mellitus without complication, without long-term current use of insulin (H)  Needs improvement.  A1c not meeting goal of less than 7%.  Patient currently struggling with medication adherence, especially her morning dose.  Likely, increased adherence will prove beneficial for her blood sugars and overall A1c.  If this is not enough to control blood sugars, could consider dosing glipizide twice daily or adding additional medications such as SGLT2 or DPP4.  Recommended patient appropriately discard Janumet that she brings in today.  Per previous note, other covered prescriptions include alogliptin, steglarto, or Jardiance. Last elevated microalbuminuria and elevated BP today indicatesACE inhibitor, no current therapy for this.   - lisinopril (ZESTRIL) 2.5 MG tablet; Take 1 tablet (2.5 mg total) by mouth daily.  Dispense: 90 tablet; Refill: 1  - pillbox for increased medication adherence  - blood glucose test (CONTOUR TEST STRIPS) strips; Use 1 each As Directed daily. Dispense brand per patient's insurance at pharmacy discretion.  Dispense: 100 strip; Refill: 3    Future assessment: BMP for K and Cr reassessment after initiating ACEi    2. Gastroesophageal reflux disease without esophagitis  Needs improvement.  Patient continues to suffer from almost daily heartburn symptoms.  Has only been using her omeprazole as needed and has not been taking her ranitidine because she is not sure how frequently to take it.  Omeprazole works best if it is taken once daily every day prior to a meal.  Also informed patient that she can take her ranitidine twice daily as prescribed.  - patient to use pillbox to take omeprazole once daily and ranitidine twice daily    3. Dyslipidemia  Stable.  Patient to continue moderate intensity simvastatin 20 mg daily.    Follow Up  Return in about 5 weeks (around 4/12/2019) for Medication  Review.    Subjective & Objective                                                     Stacey Melendez is a 38 y.o. female coming in for an initial visit for Medication Therapy Management. She was referred to me from Carlos Noel MD. presnts with  today.     Chief Complaint: no questions.     Medication Adherence/Access: Self management, does not use a pillbox daily. Likes her current system for organization. Takes medications twice daily. Does forget most morning doses.     Diabetes:  Pt currently taking metformin 1000 mg in the morning and 1500 mg in the evening, glipizide XL 10 mg daily. Patient presents with janumet  today from 2018, but states that she is no longer taking it. I do not double up dosing, if I forget my morning dose I skip it. I need test strips.   patient is not currently experiencing side effects.   SMB-2 times daily    Ranges (based on glucometer readings) :   Date FBG/2 hours PP 2 hours PP    249     206 198    235     165     150     384     187    Patient is not experiencing hypoglycemia   Recent symptoms of high blood sugar? Increased urination, when this happens I check my blood sugar and it is typically high  ACEi/ARB: None  Aspirin: Not taking due to not indicated   Diet/Exercise: Stacey eats twice daily usually. Typically eats about the same amount at each meal. Eats rice vegetables and meat. Occasionally I might drink fruit juice from the grocery store, maybe about 1-2 times a week.   Lab Results   Component Value Date    HGBA1C 8.7 (H) 2019    HGBA1C 9.0 (H) 10/08/2018    HGBA1C 7.6 (H) 2018     Lab Results   Component Value Date    GLUF 70 2013    MICROALBUR 5.00 (H) 2018    LDLCALC 78 2018    CREATININE 0.73 2018     GERD without esophagitis: Patient currently prescribed omeprazole 20 mg once daily and ranitidine 150 mg twice daily. Patient states that she only uses the omeprazole if she is having  burning, took omeprazole 3 times in the last week. Patient states that she is not taking the ranitidine, unsure of how to take this medication. Patient continues to suffer from heartburn almost every day, usually before she eats any food. After I eat the pain has been better but she is still having burning. Denies other medication use. This has been an issue for about 5 years.     Hyperlipidemia: Simvastatin 20 mg daily  Lab Results   Component Value Date    CHOL 170 12/26/2018    CHOL 147 01/04/2018    CHOL 253 (H) 10/06/2017     Lab Results   Component Value Date    HDL 44 (L) 12/26/2018    HDL 43 (L) 01/04/2018    HDL 48 (L) 10/06/2017     Lab Results   Component Value Date    LDLCALC 78 12/26/2018    LDLCALC 61 01/04/2018    LDLCALC 141 (H) 10/06/2017     Lab Results   Component Value Date    TRIG 239 (H) 12/26/2018    TRIG 214 (H) 01/04/2018    TRIG 321 (H) 10/06/2017       PMH: reviewed in EPIC   Allergies/ADRs: reviewed in EPIC   Alcohol: reviewed in EPIC   Tobacco:   Social History     Tobacco Use   Smoking Status Never Smoker   Smokeless Tobacco Current User     Types: Chew   Tobacco Comment    chews betel nut w/tobacco     Today's Vitals:   Vitals:    03/07/19 1515 03/07/19 1551   BP: 108/90 108/88   Pulse: 84    Weight: 134 lb (60.8 kg)      ----------------    Much or all of the text in this note was generated through the use of Dragon Dictate voice-to-text software. Errors in spelling or words which seem out of context are unintentional. Sound alike errors, in particular, may have escaped editing.    The patient declined an after visit summary    I spent 60 minutes with this patient today.   All changes were made via collaborative practice agreement with Carlos Noel MD. A copy of the visit note was provided to the patient's provider.     Kasey Markham, [place]  Medication Therapy Management Pharmacist  [facility]       Current Outpatient Medications   Medication Sig Dispense Refill      blood glucose test (CONTOUR TEST STRIPS) strips Use 1 each As Directed daily. Dispense brand per patient's insurance at pharmacy discretion. 100 strip 3     generic lancets (FINGERSTIX LANCETS) Use to check blood sugar daily.  Dispense brand per patient's insurance at pharmacy discretion. 100 each 3     glipiZIDE (GLUCOTROL XL) 10 MG 24 hr tablet Take 1 tablet (10 mg total) by mouth daily. 90 tablet 3     levonorgestrel-ethinyl estradiol (ORSYTHIA) 0.1-20 mg-mcg per tablet Take 1 tablet by mouth daily. 84 tablet 3     metFORMIN (GLUCOPHAGE) 1000 MG tablet Take  One in  The morning and 1 and half tablets at night 180 tablet 3     omeprazole (PRILOSEC) 20 MG capsule Take 1 capsule (20 mg total) by mouth daily. 30 capsule 11     ranitidine (ZANTAC) 150 MG tablet TAKE 1 PILL BY MOUTH 2 TIMES EVERYDAY FOR STOMACH 180 tablet 3     simvastatin (ZOCOR) 20 MG tablet TAKE 1 PILL BY MOUTH EVERYDAY FOR CHOLESTEROL 30 tablet 10     sitaGLIPtin (JANUVIA) 100 MG tablet Take 1 tablet (100 mg total) by mouth daily. With or without food 30 tablet 5     No current facility-administered medications for this visit.

## 2021-06-24 NOTE — TELEPHONE ENCOUNTER
Called [facility] again and requested denial letter be sent to [facility] PA team so we can document denial reasoning.

## 2021-06-24 NOTE — TELEPHONE ENCOUNTER
Fax received from [facility], they have started the Prior Authorization Process via Cover My Meds    CoverMyMeds Key: [id number]    Medication Name: Invokana 300mg    Insurance Plan: BCBS  PBM:   Patient ID: not provided on fax    Please complete the PA process

## 2021-06-25 NOTE — TELEPHONE ENCOUNTER
RN cannot approve Refill Request    RN can NOT refill this medication Protocol failed and NO refill given. Last office visit: 1/3/2018 Kandis Tucker MD Last Physical: Visit date not found Last MTM visit: Visit date not found Last visit same specialty: 10/30/2020 Carlos Noel MD.  Next visit within 3 mo: Visit date not found  Next physical within 3 mo: Visit date not found      Екатерина Key, [facility] Triage/Med Refill 5/31/2021    Requested Prescriptions   Pending Prescriptions Disp Refills     ANTACID, CALCIUM CARBONATE, 200 mg calcium (500 mg) chewable tablet [Pharmacy Med Name: ANTACID REGULAR STRENGTH 50 500 Tablet] 30 tablet 3     Sig: CHEW 1 TABLET (200 MG TOTAL) DAILY.       There is no refill protocol information for this order

## 2021-06-26 NOTE — TELEPHONE ENCOUNTER
RN cannot approve Refill Request    RN can NOT refill this medication PCP messaged that patient is overdue for Labs and Office Visit. Last office visit: 10/30/2020 Carlos Noel MD Last Physical: 9/21/2017 Last MTM visit: Visit date not found Last visit same specialty: 10/30/2020 Carlos Noel MD.  Next visit within 3 mo: Visit date not found  Next physical within 3 mo: Visit date not found      Rebecca Toure, [facility] Triage/Med Refill 6/23/2021    Requested Prescriptions   Pending Prescriptions Disp Refills     metFORMIN (GLUCOPHAGE) 1000 MG tablet [Pharmacy Med Name: METFORMIN HCL 1000 MG TABS 1000 Tablet] 60 tablet 3     Sig: TAKE 1 TABLET (1,000 MG TOTAL) BY MOUTH 2 (TWO) TIMES A DAY WITH MEALS FOR DIABETES       Metformin Refill Protocol Failed - 6/23/2021  7:58 AM        Failed - LFT or AST or ALT in last 12 months     Albumin   Date Value Ref Range Status   12/26/2018 3.8 3.5 - 5.0 g/dL Final     Bilirubin, Total   Date Value Ref Range Status   12/26/2018 0.8 0.0 - 1.0 mg/dL Final     Alkaline Phosphatase   Date Value Ref Range Status   12/26/2018 56 45 - 120 U/L Final     AST   Date Value Ref Range Status   05/22/2020 15 0 - 40 U/L Final     ALT   Date Value Ref Range Status   05/22/2020 10 0 - 45 U/L Final     Protein, Total   Date Value Ref Range Status   12/26/2018 7.1 6.0 - 8.0 g/dL Final                Failed - GFR or Serum Creatinine in last 6 months     GFR MDRD Non Af Amer   Date Value Ref Range Status   05/22/2020 >60 >60 mL/min/1.73m2 Final     GFR MDRD Af Amer   Date Value Ref Range Status   05/22/2020 >60 >60 mL/min/1.73m2 Final             Failed - Visit with PCP or prescribing provider visit in last 6 months or next 3 months     Last office visit with prescriber/PCP: Visit date not found OR same dept: 10/30/2020 Carlos Noel MD OR same specialty: 10/30/2020 Carlos Noel MD Last physical: Visit date not found Last MTM visit: Visit date not found         Next appt within 3 mo: Visit  date not found  Next physical within 3 mo: Visit date not found  Prescriber OR PCP: Carlos Noel MD  Last diagnosis associated with med order: 1. Type 2 diabetes mellitus without complication, without long-term current use of insulin (H)  - metFORMIN (GLUCOPHAGE) 1000 MG tablet [Pharmacy Med Name: METFORMIN HCL 1000 MG TABS 1000 Tablet]; TAKE 1 TABLET (1,000 MG TOTAL) BY MOUTH 2 (TWO) TIMES A DAY WITH MEALS FOR DIABETES  Dispense: 60 tablet; Refill: 3     If protocol passes may refill for 12 months if within 3 months of last provider visit (or a total of 15 months).           Failed - A1C in last 6 months     Hemoglobin A1c   Date Value Ref Range Status   10/30/2020 5.6 <=5.6 % Final     Comment:     Normal <5.7% Prediabete 5.7-6.4% Diabletes 6.5% or higher - adopted from ADA consensus guidelines               Failed - Microalbumin in last year      Microalbumin, Random Urine   Date Value Ref Range Status   01/07/2020 2.24 (H) 0.00 - 1.99 mg/dL Final                  Passed - Blood pressure in last 12 months     BP Readings from Last 1 Encounters:   11/04/20 102/62

## 2021-06-29 NOTE — PROGRESS NOTES
Progress Notes by Lynn Devi BSW at 8/31/2020  2:00 PM     Author: Lynn Devi BSW Service: -- Author Type:     Filed: 8/31/2020  4:55 PM Encounter Date: 8/31/2020 Status: Signed    : Lynn Devi BSW ()       Clinic Care Coordination Contact    Clinic Care Coordination Contact  OUTREACH    Visit Note:    Present for today's visit is pt, in-house interp Hue, and CCC SW.    Stacey Melendez is a 41yo female who is 33w5d pregnant. She was referred for an initial CCC assessment and baby resources consult. She is  to spouse/FOB Sa Lar. They have 4 children together (ages 17yo, 14yo, 10yo, 10yo), all of whom were born in [place]. This 5th baby will be the first of their children born in the [place]    She is not yet established with the WIC program. She was given their contact information by phone today. SW also sent this information to her via [employer] text message. She was informed that they have Wendy speaking staff/interpreters available, however, reports that she will ask her oldest child to help her call. She is eligible for a carseat referral through [employer] which was placed during today's visit.     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 She plans to keep working after the baby is born. Unclear whether or not her  is working. The family is receiving $490/month in SNAP benefits. She has her 's license and is able to drive herself to appointments when needed. She prefers to use medical transportation if the appointment is at a location she is not familiar with.    She was not initially aware of her ultrasound appointment today at 3:30pm. She expressed gratitude for the reminder and reported that she would be able to attend. She knows where the appointment is located and is aware of her next two ultrasound appointments on 9/4/20 and 9/8/20 as well. SW recommended that she ask for a print out of  her upcoming appointments when at the clinic/hospital today.    Plan:  1.) See goals.  2.) Begin scheduled outreach.    Referral Information:  Referral Source: PCP  Primary Diagnosis: Psychosocial    Chief Complaint   Patient presents with   ? Clinic Care Coordination - Initial     Clinic Utilization  Difficulty keeping appointments:: No  Compliance Concerns: No  No-Show Concerns: No  No PCP office visit in Past Year: No     Utilization    Last refreshed: 8/31/2020  4:04 PM:  Hospital Admissions 0           Last refreshed: 8/31/2020  4:04 PM:  ED Visits 1           Last refreshed: 8/31/2020  4:04 PM:  No Show Count (past year) 3              Current as of: 8/31/2020  4:04 PM            Clinical Concerns:  Current Medical Concerns: Pregnant; considered high risk due to diabetes diagnosis and advanced maternal age  Current Behavioral Concerns: None    Pain  Pain (GOAL):: No  Health Maintenance Reviewed: Not assessed  Clinical Pathway: None     Medication Management: Pt reports taking all medications as prescribed by her medical providers.     Functional Status:  Dependent ADLs:: Independent  Dependent IADLs:: Independent  Bed or wheelchair confined:: No  Mobility Status: Independent  Fallen 2 or more times in the past year?: No  Any fall with injury in the past year?: No    Living Situation:  Current living arrangement:: I live in a private home with family  Type of residence:: Apartment    Lifestyle & Psychosocial Needs:  Lifestyle   ? Physical activity     Days per week: 5 days     Minutes per session: 30 min   ? Stress: Only a little     Social Needs   ? Financial resource strain: Somewhat hard   ? Food insecurity     Worry: Never true     Inability: Never true   ? Transportation needs     Medical: No     Non-medical: No     Diet:: Diabetic diet  Inadequate nutrition (GOAL):: No  Tube Feeding: No  Inadequate activity/exercise (GOAL):: No  Significant changes in sleep pattern (GOAL): No  Transportation means::  Regular car, Medical transport(has a drivers license, but prefers only to drive short distances or to familiar places)     Pentecostal or spiritual beliefs that impact treatment:: No  Mental health DX:: No  Mental health management concern (GOAL):: No  Informal Support system:: Family, Spouse, Children     Socioeconomic History   ? Marital status:      Spouse name: Sa Slater   ? Number of children: 4   ? Years of education: Not on file   ? Highest education level: Not on file   Occupational History   ? Occupation: PCA     Employer: [employer]   Relationships   ? Social connections     Talks on phone: More than three times a week     Gets together: More than three times a week     Attends Mormon service: More than 4 times per year     Active member of club or organization: No     Attends meetings of clubs or organizations: Never     Relationship status:    ? Intimate partner violence     Fear of current or ex partner: No     Emotionally abused: No     Physically abused: No     Forced sexual activity: No     Tobacco Use   ? Smoking status: Never Smoker   ? Smokeless tobacco: Former User     Types: Chew   ? Tobacco comment: chews betel nut NO Tobacco   Substance and Sexual Activity   ? Alcohol use: No     Frequency: Never     Binge frequency: Never   ? Drug use: No   ? Sexual activity: Yes     Partners: Male     Birth control/protection: None     Comment: currently pregnant     Community Resources: County Programs, Infant Car Seat program, Pregnancy Programs, WIC  Supplies used at home:: Diabetic Supplies  Equipment Currently Used at Home: glucometer    Advance Care Plan/Directive  Advanced Care Plans/Directives on file:: No  Advanced Care Plan/Directive Status: Considering Options    Referrals Placed: Community Resources, County Resources     Goals        Patient Stated    ? Financial Wellbeing (pt-stated)      Goal statement: I want to add my baby to health insurance and resolve any  outstanding account balance within 3 months of my baby's birth.    Date goal set: 8/31/20  Barriers: Language barrier, high risk pregnancy  Strengths: Appears willing to accept support, has reliable source of transportation (self), good familial support by older children and spouse  Date to achieve by: 12/31/20  Patient expressed understanding of goal: Yes    Action steps to achieve this goal:  1.  I will work with the inpatient financial worker during/following hospitalization   2.  I will contact [facility] with the support of [facility] and provide any/all information necessary to add my baby to health insurance.  3.  I will communicate with CHW at outreach.       ? Healthy Eating (pt-stated)      Goal statement: I would like to apply for benefits through the [facility] program within the next 30 days.     Date goal set: 8/31/20  Barriers: Language barrier, high risk pregnancy  Strengths: Appears willing to accept support, has reliable source of transportation (self), good familial support by older children and spouse  Date to achieve by: 10/1/20  Patient expressed understanding of goal: Yes     Action steps to achieve this goal:  1.  I will contact the [facility] Program at [phone] to apply for benefits.  2.  I will provide all necessary paperwork/documentation to assist in this process.  3.  I will communicate with CHW at outreach.     NOTE: Pt was given contact information for local [facility] office on 8/31/20. SW informed her that it is best to call prior to baby being born. She agrees to do so and will ask her older children for assistance if needed.      ? Other (pt-stated)      Goal statement: I would like assistance obtaining a carseat through [employer] within the next 60 days.    Date goal set: 8/31/20  Barriers: Language barrier, high risk pregnancy  Strengths: Appears willing to accept support, has reliable source of transportation (self), good familial support by older children  and spouse  Date to achieve by: 10/31/20  Patient expressed understanding of goal: Yes    Action steps to achieve this goal:  1.  CCC SW submitted carseat referral to [employer] via online form on 8/31/20.  2.  I will answer the phone when [employer] contacts me 4-6 weeks prior to my ELDER (10/14/20) to arrange carseat delivery.  3.  I will communicate with CHW at outreach.          Future Appointments              In 4 days MP[facility] US [facility], M[facility] Fet    In 4 days [facility] , M[facility] Fet    In 1 week [facility] US [facility], M[facility] Fet    In 1 week [facility] , Maternal Fet    In 1 week Dianne Vasquez MD [facility]    In 1 week [facility] US [facility], Maternal Fet    In 1 week MPW PRATIK BOB [facility] , Maternal Fet

## 2021-07-03 NOTE — ADDENDUM NOTE
Addendum Note by Kasey Markham PharmD at 2/7/2020  2:00 PM     Author: Kasey Markham, Lamont Service: -- Author Type: Pharmacist    Filed: 2/12/2020  7:13 AM Encounter Date: 2/7/2020 Status: Signed    : Kasey Markham PharmD (Pharmacist)    Addended by: KASEY MARKHAM on: 2/12/2020 07:13 AM        Modules accepted: Orders

## 2021-07-03 NOTE — ADDENDUM NOTE
Addendum Note by Silvia Mariano RN at 7/16/2020  3:00 PM     Author: Silvia Mariano RN Service: -- Author Type: Registered Nurse    Filed: 7/22/2020  8:24 AM Encounter Date: 7/16/2020 Status: Signed    : Silvia Mariano RN (Registered Nurse)    Addended by: SILVIA MARIANO on: 7/22/2020 08:24 AM        Modules accepted: Orders

## 2021-07-06 ENCOUNTER — COMMUNICATION - HEALTHEAST (OUTPATIENT)
Dept: FAMILY MEDICINE | Facility: CLINIC | Age: 41
End: 2021-07-06

## 2021-07-06 ENCOUNTER — AMBULATORY - HEALTHEAST (OUTPATIENT)
Dept: FAMILY MEDICINE | Facility: CLINIC | Age: 41
End: 2021-07-06

## 2021-07-06 DIAGNOSIS — E11.9 TYPE 2 DIABETES MELLITUS WITHOUT COMPLICATION, WITHOUT LONG-TERM CURRENT USE OF INSULIN (H): ICD-10-CM

## 2021-07-06 NOTE — TELEPHONE ENCOUNTER
Telephone Encounter by Hamida Washington MA at 7/6/2021 10:18 AM     Author: Hamida Washington MA Service: -- Author Type: Medical Assistant    Filed: 7/6/2021 10:18 AM Encounter Date: 7/6/2021 Status: Signed    : Hamida Washington MA (Medical Assistant)       Please refill diabetic meds

## 2021-07-14 PROBLEM — O24.119 PRE-EXISTING TYPE 2 DIABETES MELLITUS DURING PREGNANCY, ANTEPARTUM: Status: RESOLVED | Noted: 2020-04-13 | Resolved: 2020-11-04

## 2021-07-14 PROBLEM — O09.529 HIGH-RISK PREGNANCY, ELDERLY MULTIGRAVIDA, UNSPECIFIED TRIMESTER: Status: RESOLVED | Noted: 2020-04-01 | Resolved: 2020-11-04

## 2021-07-14 PROBLEM — Z34.90 PREGNANT: Status: RESOLVED | Noted: 2020-10-05 | Resolved: 2020-11-04

## 2021-09-29 ENCOUNTER — OFFICE VISIT (OUTPATIENT)
Dept: FAMILY MEDICINE | Facility: CLINIC | Age: 41
End: 2021-09-29
Payer: COMMERCIAL

## 2021-09-29 VITALS
HEIGHT: 59 IN | DIASTOLIC BLOOD PRESSURE: 80 MMHG | BODY MASS INDEX: 25.64 KG/M2 | OXYGEN SATURATION: 98 % | TEMPERATURE: 98.5 F | RESPIRATION RATE: 18 BRPM | HEART RATE: 90 BPM | SYSTOLIC BLOOD PRESSURE: 116 MMHG | WEIGHT: 127.19 LBS

## 2021-09-29 DIAGNOSIS — E08.00 DIABETES MELLITUS DUE TO UNDERLYING CONDITION WITH HYPEROSMOLARITY WITHOUT COMA, UNSPECIFIED WHETHER LONG TERM INSULIN USE (H): Primary | ICD-10-CM

## 2021-09-29 DIAGNOSIS — M79.18 MYALGIA, OTHER SITE: ICD-10-CM

## 2021-09-29 DIAGNOSIS — Z23 ENCOUNTER FOR VACCINATION: ICD-10-CM

## 2021-09-29 DIAGNOSIS — E78.5 HYPERLIPIDEMIA LDL GOAL <100: ICD-10-CM

## 2021-09-29 DIAGNOSIS — K29.00 ACUTE SUPERFICIAL GASTRITIS WITHOUT HEMORRHAGE: ICD-10-CM

## 2021-09-29 LAB
ALBUMIN SERPL-MCNC: 3.6 G/DL (ref 3.5–5)
ALP SERPL-CCNC: 70 U/L (ref 45–120)
ALT SERPL W P-5'-P-CCNC: 28 U/L (ref 0–45)
ANION GAP SERPL CALCULATED.3IONS-SCNC: 14 MMOL/L (ref 5–18)
AST SERPL W P-5'-P-CCNC: 19 U/L (ref 0–40)
BILIRUB SERPL-MCNC: 0.3 MG/DL (ref 0–1)
BUN SERPL-MCNC: 10 MG/DL (ref 8–22)
CALCIUM SERPL-MCNC: 9.6 MG/DL (ref 8.5–10.5)
CHLORIDE BLD-SCNC: 101 MMOL/L (ref 98–107)
CHOLEST SERPL-MCNC: 217 MG/DL
CO2 SERPL-SCNC: 21 MMOL/L (ref 22–31)
CREAT SERPL-MCNC: 0.85 MG/DL (ref 0.6–1.1)
ERYTHROCYTE [SEDIMENTATION RATE] IN BLOOD BY WESTERGREN METHOD: 8 MM/HR (ref 0–20)
FASTING STATUS PATIENT QL REPORTED: NO
GFR SERPL CREATININE-BSD FRML MDRD: 86 ML/MIN/1.73M2
GLUCOSE BLD-MCNC: 274 MG/DL (ref 70–125)
HBA1C MFR BLD: 7.7 % (ref 0–5.6)
HDLC SERPL-MCNC: 42 MG/DL
LDLC SERPL CALC-MCNC: 111 MG/DL
LDLC SERPL CALC-MCNC: ABNORMAL MG/DL
POTASSIUM BLD-SCNC: 3.6 MMOL/L (ref 3.5–5)
PROT SERPL-MCNC: 7.3 G/DL (ref 6–8)
SODIUM SERPL-SCNC: 136 MMOL/L (ref 136–145)
TRIGL SERPL-MCNC: 563 MG/DL

## 2021-09-29 PROCEDURE — 83036 HEMOGLOBIN GLYCOSYLATED A1C: CPT | Performed by: FAMILY MEDICINE

## 2021-09-29 PROCEDURE — 99214 OFFICE O/P EST MOD 30 MIN: CPT | Mod: 25 | Performed by: FAMILY MEDICINE

## 2021-09-29 PROCEDURE — 80053 COMPREHEN METABOLIC PANEL: CPT | Performed by: FAMILY MEDICINE

## 2021-09-29 PROCEDURE — 83721 ASSAY OF BLOOD LIPOPROTEIN: CPT | Performed by: FAMILY MEDICINE

## 2021-09-29 PROCEDURE — 90686 IIV4 VACC NO PRSV 0.5 ML IM: CPT | Performed by: FAMILY MEDICINE

## 2021-09-29 PROCEDURE — 85652 RBC SED RATE AUTOMATED: CPT | Performed by: FAMILY MEDICINE

## 2021-09-29 PROCEDURE — 90471 IMMUNIZATION ADMIN: CPT | Performed by: FAMILY MEDICINE

## 2021-09-29 PROCEDURE — 36415 COLL VENOUS BLD VENIPUNCTURE: CPT | Performed by: FAMILY MEDICINE

## 2021-09-29 PROCEDURE — 80061 LIPID PANEL: CPT | Performed by: FAMILY MEDICINE

## 2021-09-29 RX ORDER — FAMOTIDINE 40 MG/1
40 TABLET, FILM COATED ORAL DAILY
Qty: 30 TABLET | Refills: 3 | Status: SHIPPED | OUTPATIENT
Start: 2021-09-29 | End: 2021-11-11

## 2021-09-29 RX ORDER — CALCIUM CARBONATE 500(1250)
TABLET,CHEWABLE ORAL
COMMUNITY
Start: 2021-06-01 | End: 2021-09-29

## 2021-09-29 ASSESSMENT — MIFFLIN-ST. JEOR: SCORE: 1144.61

## 2021-09-29 NOTE — PROGRESS NOTES
ASSESSMENT:  1. Diabetes mellitus due to underlying condition with hyperosmolarity without coma, unspecified whether long term insulin use (H)  Blood sugars have been high in the mornings typically around 170 her last A1c was done more than 9 months ago when it was below 6.  She is taking the Metformin regularly she is not forgetting doses.  We will check an A1c today check a BMP.  She denies any fainting but occasionally will have a headache and notes no fatigue.  - Hemoglobin A1c; Future  - Comprehensive metabolic panel (BMP + Alb, Alk Phos, ALT, AST, Total. Bili, TP); Future    2. Hyperlipidemia LDL goal <100    Rechecking a cholesterol panel she is nonfasting today.  - Comprehensive metabolic panel (BMP + Alb, Alk Phos, ALT, AST, Total. Bili, TP); Future  - Lipid panel reflex to direct LDL Non-fasting; Future    3. Myalgia, other site    Complains of generalized muscle ache no identifiable joint pain today she says she is stiff in the morning and gets better during the day no hereditary myalgia noted.  She never had these symptoms before checking ESR today.  - ESR: Erythrocyte sedimentation rate; Future    4. Acute superficial gastritis without hemorrhage    Previously was on a PPI is now been taking antacids takes it 3-5 times a week starting Pepcid side effects discussed in detail.  - famotidine (PEPCID) 40 MG tablet; Take 1 tablet (40 mg) by mouth daily  Dispense: 30 tablet; Refill: 3      5.  Vaccination for influenza    She agrees to have the influenza vaccination done today potential side effects discussed    There are no Patient Instructions on file for this visit.    Orders Placed This Encounter   Procedures     Hemoglobin A1c     Comprehensive metabolic panel (BMP + Alb, Alk Phos, ALT, AST, Total. Bili, TP)     Lipid panel reflex to direct LDL Non-fasting     ESR: Erythrocyte sedimentation rate     Medications Discontinued During This Encounter   Medication Reason     calcium carbonate 1250 (500 Ca) MG  "CHEW      Prenatal Vit-Fe Fumarate-FA (PRENATAL MULTIVITAMIN W/IRON) 27-0.8 MG tablet        No follow-ups on file.    CHIEF COMPLAINT:  chief complaint    HISTORY OF PRESENT ILLNESS:  Stacey is a 41 year old female who is here for a follow-up for diabetes hyperlipidemia and gastritis.  She says she is also been experiencing body ache and pain which is better if she walks throughout the day the pain affects her joints mainly in her knees elbows and back.  It does not disturb her sleep.  She has been checking her blood sugars which she thinks are rather high.  She has been taking her Metformin.  She states she is not really following a diabetic diet but she does not drink any soda or juices at this time.  She states that she still has heartburn is taking antacids 3-5 times a week.  She does need spicy food.  She denies any current chest pain or shortness of breath but sometimes will help epigastric tenderness and a burning sensation which radiates up into her throat.    REVIEW OF SYSTEMS:     Musculoskeletal positive for muscle ache as mentioned in the HPI the pain is a throbbing deep pain is episodic and not related to exertion  GI epigastric burning which radiates up in the sternum is noted.  10 point review of  All other systems are negative.    PFSH:    Social history reviewed    TOBACCO USE:  History   Smoking Status     Never Smoker   Smokeless Tobacco     Current User     Types: Chew     Comment: chews betel nut       VITALS:  Vitals:    09/29/21 1708   BP: 116/80   Pulse: 90   Resp: 18   Temp: 98.5  F (36.9  C)   TempSrc: Oral   SpO2: 98%   Weight: 57.7 kg (127 lb 3 oz)   Height: 1.486 m (4' 10.5\")     Wt Readings from Last 3 Encounters:   09/29/21 57.7 kg (127 lb 3 oz)   11/04/20 60.8 kg (134 lb)   10/30/20 60.8 kg (134 lb 1 oz)       PHYSICAL EXAM:    Interactive middle-aged lady sitting comfortably in exam room no acute distress  HEENT neck supple mucous members moist the thyroid is not enlarged there is no " tenderness noted over the neck there is no lymph enlargement noted  Respiratory system clear to auscultation equal breath sounds no wheezes no crackles  CVS regular rate and rhythm no murmurs no rubs no gallops appreciated no pedal edema  Abdomen soft there is no focal tenderness in the abdomen.  There is no hepatosplenomegaly.  Musculoskeletal system there is no evidence of effusion or tenderness in her wrist, ankle, knee, shoulder joints.  There is no tenderness over the sternum.    DATA REVIEWED:  Additional History from Old Records Summarized (2): 0  Decision to Obtain Records (1): 0  Radiology Tests Summarized or Ordered (1): 0  Labs Reviewed or Ordered (1): 0  Medicine Test Summarized or Ordered (1): 0  Independent Review of EKG or X-RAY(2 each): 0    The visit lasted a total of 30 minutes.    MEDICATIONS:  Current Outpatient Medications   Medication Sig Dispense Refill     famotidine (PEPCID) 40 MG tablet Take 1 tablet (40 mg) by mouth daily 30 tablet 3

## 2021-09-30 NOTE — RESULT ENCOUNTER NOTE
Please inform patient with help of  that her blood sugar is elevated she should continue taking her Metformin and watch her diet no change in medication is needed her triglycerides or cholesterol is also elevated she should take her Lipitor daily and avoid all fried foods I will be seeing her in about 3 and half weeks.

## 2021-10-03 RX ORDER — LEVONORGESTREL/ETHIN.ESTRADIOL 0.1-0.02MG
1 TABLET ORAL DAILY
Qty: 84 TABLET | Refills: 3 | Status: SHIPPED | OUTPATIENT
Start: 2021-10-03 | End: 2022-03-17

## 2021-10-03 NOTE — TELEPHONE ENCOUNTER
"  Disp Refills Start End STEFAN    levonorgestrel-ethinyl estradiol (AVIANE,ALESSE,LESSINA) 0.1-20 mg-mcg per tablet 84 tablet 3 11/4/2020  --   Sig - Route: Take 1 tablet by mouth daily. - Oral   Sent to pharmacy as: levonorgestreL-ethinyl estradioL 0.1 mg-20 mcg tablet (AVIANE,ALESSE,LESSINA)   Notes to Pharmacy: Please deliver   E-Prescribing Status: Receipt confirmed by pharmacy (11/4/2020  1:18 PM CST)       Last Written Prescription Date:  11/4/20  Last Fill Quantity: 84,  # refills: 3   Last office visit provider:  9/29/21     Requested Prescriptions   Pending Prescriptions Disp Refills     VIENVA 0.1-20 MG-MCG tablet [Pharmacy Med Name: VIENVA 0.1-20 MG-MCG TABS 0.1-20 Tablet] 84 tablet 3     Sig: TAKE 1 TABLET BY MOUTH DAILY.       Contraceptives Protocol Failed - 10/2/2021 10:23 AM        Failed - Medication is active on med list        Passed - Patient is not a current smoker if age is 35 or older        Passed - Recent (12 mo) or future (30 days) visit within the authorizing provider's specialty     Patient has had an office visit with the authorizing provider or a provider within the authorizing providers department within the previous 12 mos or has a future within next 30 days. See \"Patient Info\" tab in inbasket, or \"Choose Columns\" in Meds & Orders section of the refill encounter.              Passed - No active pregnancy on record        Passed - No positive pregnancy test in past 12 months             Roderick Polk RN 10/03/21 11:03 AM  "

## 2021-10-08 ENCOUNTER — IMMUNIZATION (OUTPATIENT)
Dept: NURSING | Facility: CLINIC | Age: 41
End: 2021-10-08
Payer: COMMERCIAL

## 2021-10-08 PROCEDURE — 91300 PR COVID VAC PFIZER DIL RECON 30 MCG/0.3 ML IM: CPT | Performed by: FAMILY MEDICINE

## 2021-10-08 PROCEDURE — 0001A PR COVID VAC PFIZER DIL RECON 30 MCG/0.3 ML IM: CPT | Performed by: FAMILY MEDICINE

## 2021-10-27 ENCOUNTER — OFFICE VISIT (OUTPATIENT)
Dept: FAMILY MEDICINE | Facility: CLINIC | Age: 41
End: 2021-10-27
Payer: COMMERCIAL

## 2021-10-27 VITALS
WEIGHT: 127.38 LBS | DIASTOLIC BLOOD PRESSURE: 68 MMHG | HEIGHT: 59 IN | BODY MASS INDEX: 25.68 KG/M2 | TEMPERATURE: 98.2 F | SYSTOLIC BLOOD PRESSURE: 110 MMHG | RESPIRATION RATE: 20 BRPM | HEART RATE: 94 BPM

## 2021-10-27 DIAGNOSIS — E78.5 HYPERLIPIDEMIA LDL GOAL <100: ICD-10-CM

## 2021-10-27 DIAGNOSIS — E08.00 DIABETES MELLITUS DUE TO UNDERLYING CONDITION WITH HYPEROSMOLARITY WITHOUT COMA, UNSPECIFIED WHETHER LONG TERM INSULIN USE (H): Primary | ICD-10-CM

## 2021-10-27 DIAGNOSIS — K21.00 GASTROESOPHAGEAL REFLUX DISEASE WITH ESOPHAGITIS WITHOUT HEMORRHAGE: ICD-10-CM

## 2021-10-27 LAB — TSH SERPL DL<=0.005 MIU/L-ACNC: 1.45 UIU/ML (ref 0.3–5)

## 2021-10-27 PROCEDURE — 99214 OFFICE O/P EST MOD 30 MIN: CPT | Performed by: FAMILY MEDICINE

## 2021-10-27 PROCEDURE — 36415 COLL VENOUS BLD VENIPUNCTURE: CPT | Performed by: FAMILY MEDICINE

## 2021-10-27 PROCEDURE — 84443 ASSAY THYROID STIM HORMONE: CPT | Performed by: FAMILY MEDICINE

## 2021-10-27 ASSESSMENT — MIFFLIN-ST. JEOR: SCORE: 1145.46

## 2021-10-27 NOTE — PROGRESS NOTES
ASSESSMENT and plan   1. Diabetes mellitus due to underlying condition with hyperosmolarity without coma, unspecified whether long term insulin use (H)  Blood sugars are elevated she did not bring her glucometer in.  Her A1c is now above 7.7 previously was 5.8.  She is taking the Metformin.  I am starting her on Januvia follow-up recommended in 3 weeks we will recheck her blood sugars at that time she says her average is above 200.  - sitagliptin (JANUVIA) 100 MG tablet; Take 1 tablet (100 mg) by mouth daily  Dispense: 30 tablet; Refill: 3    2. Hyperlipidemia LDL goal <100    Triglycerides at last visit were above 500.  Rechecking a TSH today.  - TSH with free T4 reflex; Future  - TSH with free T4 reflex    3. Gastroesophageal reflux disease with esophagitis without hemorrhage    She has still experience abdominal pain however she has been taking her Pepcid incorrectly she is taking it once per day would like her to take it twice daily.          There are no Patient Instructions on file for this visit.    Orders Placed This Encounter   Procedures     TSH with free T4 reflex     There are no discontinued medications.    Return in about 3 weeks (around 11/17/2021) for gastritis.    CHIEF COMPLAINT:  chief complaint    HISTORY OF PRESENT ILLNESS:  Stacey is a 41 year old female   Who is returning today to discuss her diabetes and hyperlipidemia she did not bring her medications in today for review she did not bring her glucometer she has she has her blood sugars are higher ranging above 200 in the morning.  She says she checks her blood sugars daily she has been taking her medications.  She says she takes 1 diabetes medication twice a day and one medication for stomach daily.  She says she still having a stomach burning and discomfort.  She denies any chest pain or shortness of breath.    REVIEW OF SYSTEMS:     GI positive for abdominal burning especially after eating 10 point review of  All other systems are  "negative.    PFSH:    Social history reviewed    TOBACCO USE:  History   Smoking Status     Never Smoker   Smokeless Tobacco     Current User     Types: Chew     Comment: chews betel nut       VITALS:  Vitals:    10/27/21 1616   BP: 110/68   Pulse: 94   Resp: 20   Temp: 98.2  F (36.8  C)   TempSrc: Oral   Weight: 57.8 kg (127 lb 6 oz)   Height: 1.486 m (4' 10.5\")     Wt Readings from Last 3 Encounters:   10/27/21 57.8 kg (127 lb 6 oz)   09/29/21 57.7 kg (127 lb 3 oz)   11/04/20 60.8 kg (134 lb)       PHYSICAL EXAM:  Interactive female sitting comfortably in exam room no acute distress  HEENT neck supple mucous members moist oral cavity shows no exudate no erythema  Respiratory system clear to auscultation equal breath sounds no wheezes no crackles  GI the abdomen soft there is no focal tenderness bowel sounds are present  Psych oriented x3 not agitated well-groomed    DATA REVIEWED:  Additional History from Old Records Summarized (2): 0  Decision to Obtain Records (1): 0  Radiology Tests Summarized or Ordered (1): 0  Labs Reviewed or Ordered (1): 0  Medicine Test Summarized or Ordered (1): 0  Independent Review of EKG or X-RAY(2 each): 0    The visit lasted a total of 30 minutes .    MEDICATIONS:  Current Outpatient Medications   Medication Sig Dispense Refill     famotidine (PEPCID) 40 MG tablet Take 1 tablet (40 mg) by mouth daily 30 tablet 3     levonorgestrel-ethinyl estradiol (VIENVA) 0.1-20 MG-MCG tablet Take 1 tablet by mouth daily 84 tablet 3     sitagliptin (JANUVIA) 100 MG tablet Take 1 tablet (100 mg) by mouth daily 30 tablet 3       "

## 2021-10-28 ENCOUNTER — TELEPHONE (OUTPATIENT)
Dept: FAMILY MEDICINE | Facility: CLINIC | Age: 41
End: 2021-10-28

## 2021-10-28 NOTE — RESULT ENCOUNTER NOTE
Please inform patient that lab tests from yesterday are normal she should avoid saturated fat and fried food in her diet we will recheck her lab tests in 6 months thank you

## 2021-10-28 NOTE — TELEPHONE ENCOUNTER
Called patient with phone  on the line. Patient verbalized understanding to provider's message and had no questions at call completion.     Anais DUMONT RN      ----- Message from Carlos Noel MD sent at 10/28/2021  8:59 AM CDT -----  Please inform patient that lab tests from yesterday are normal she should avoid saturated fat and fried food in her diet we will recheck her lab tests in 6 months thank you

## 2021-10-29 ENCOUNTER — IMMUNIZATION (OUTPATIENT)
Dept: NURSING | Facility: CLINIC | Age: 41
End: 2021-10-29
Attending: FAMILY MEDICINE
Payer: COMMERCIAL

## 2021-10-29 PROCEDURE — 91300 PR COVID VAC PFIZER DIL RECON 30 MCG/0.3 ML IM: CPT | Performed by: FAMILY MEDICINE

## 2021-10-29 PROCEDURE — 0002A PR COVID VAC PFIZER DIL RECON 30 MCG/0.3 ML IM: CPT | Performed by: FAMILY MEDICINE

## 2021-11-11 ENCOUNTER — OFFICE VISIT (OUTPATIENT)
Dept: FAMILY MEDICINE | Facility: CLINIC | Age: 41
End: 2021-11-11
Payer: COMMERCIAL

## 2021-11-11 VITALS
SYSTOLIC BLOOD PRESSURE: 98 MMHG | HEART RATE: 92 BPM | WEIGHT: 130 LBS | RESPIRATION RATE: 16 BRPM | HEIGHT: 59 IN | DIASTOLIC BLOOD PRESSURE: 66 MMHG | TEMPERATURE: 98.2 F | BODY MASS INDEX: 26.21 KG/M2

## 2021-11-11 DIAGNOSIS — K21.00 GASTROESOPHAGEAL REFLUX DISEASE WITH ESOPHAGITIS WITHOUT HEMORRHAGE: Primary | ICD-10-CM

## 2021-11-11 DIAGNOSIS — K29.00 ACUTE SUPERFICIAL GASTRITIS WITHOUT HEMORRHAGE: ICD-10-CM

## 2021-11-11 DIAGNOSIS — E78.1 PURE HYPERTRIGLYCERIDEMIA: ICD-10-CM

## 2021-11-11 DIAGNOSIS — E08.00 DIABETES MELLITUS DUE TO UNDERLYING CONDITION WITH HYPEROSMOLARITY WITHOUT COMA, WITHOUT LONG-TERM CURRENT USE OF INSULIN (H): ICD-10-CM

## 2021-11-11 DIAGNOSIS — E08.00 DIABETES MELLITUS DUE TO UNDERLYING CONDITION WITH HYPEROSMOLARITY WITHOUT COMA, UNSPECIFIED WHETHER LONG TERM INSULIN USE (H): ICD-10-CM

## 2021-11-11 PROCEDURE — 99207 PR FOOT EXAM NO CHARGE: CPT | Performed by: FAMILY MEDICINE

## 2021-11-11 PROCEDURE — 99214 OFFICE O/P EST MOD 30 MIN: CPT | Mod: 25 | Performed by: FAMILY MEDICINE

## 2021-11-11 PROCEDURE — 82043 UR ALBUMIN QUANTITATIVE: CPT | Performed by: FAMILY MEDICINE

## 2021-11-11 RX ORDER — FAMOTIDINE 40 MG/1
40 TABLET, FILM COATED ORAL DAILY
Qty: 30 TABLET | Refills: 3 | Status: SHIPPED | OUTPATIENT
Start: 2021-11-11 | End: 2021-11-11

## 2021-11-11 RX ORDER — FAMOTIDINE 40 MG/1
40 TABLET, FILM COATED ORAL DAILY
Qty: 30 TABLET | Refills: 3 | Status: SHIPPED | OUTPATIENT
Start: 2021-11-11 | End: 2022-06-17

## 2021-11-11 RX ORDER — ATORVASTATIN CALCIUM 20 MG/1
20 TABLET, FILM COATED ORAL DAILY
Qty: 90 TABLET | Refills: 3 | Status: SHIPPED | OUTPATIENT
Start: 2021-11-11 | End: 2022-06-17

## 2021-11-11 RX ORDER — BLOOD-GLUCOSE METER
1 EACH MISCELLANEOUS ONCE
Qty: 1 KIT | Refills: 0 | Status: SHIPPED | OUTPATIENT
Start: 2021-11-11 | End: 2021-11-11

## 2021-11-11 RX ORDER — GLUCOSAMINE HCL/CHONDROITIN SU 500-400 MG
CAPSULE ORAL
Qty: 100 EACH | Refills: 4 | Status: SHIPPED | OUTPATIENT
Start: 2021-11-11

## 2021-11-11 ASSESSMENT — MIFFLIN-ST. JEOR: SCORE: 1157.37

## 2021-11-11 NOTE — PROGRESS NOTES
ASSESSMENT and plan   1. Gastroesophageal reflux disease with esophagitis without hemorrhage    Occasional symptoms of acid reflux noted continue current medication I will have to see her again in 2 months.    2. Diabetes mellitus due to underlying condition with hyperosmolarity without coma, without long-term current use of insulin (H)    He tolerated the Januvia with blood test could not be verified throughout the whole month as her glucometer is not working correctly lowest blood values between 140 275's values at 220 no side effects noted with Januvia continue Metformin new meter prescribed diabetic foot exam normal albumin level checked today  - Albumin Random Urine Quantitative with Creat Ratio; Future  - [place] FOOT EXAM NO CHARGE  - Blood Glucose Monitoring Suppl (CONTOUR NEXT ONE) KIT; 1 each once for 1 dose  Dispense: 1 kit; Refill: 0  - CONTOUR NEXT TEST test strip; Use to test blood sugar 3  times daily.  Dispense: 100 strip; Refill: 4  - alcohol swab prep pads; Use to swab area of injection/zafar as directed.  Dispense: 100 each; Refill: 4    3. Pure hypertriglyceridemia    Thyroid test was normal she has high triglycerides starting atorvastatin side effects discussed  - atorvastatin (LIPITOR) 20 MG tablet; Take 1 tablet (20 mg) by mouth daily  Dispense: 90 tablet; Refill: 3    4. Acute superficial gastritis without hemorrhage    - famotidine (PEPCID) 40 MG tablet; Take 1 tablet (40 mg) by mouth daily  Dispense: 30 tablet; Refill: 3    5. Diabetes mellitus due to underlying condition with hyperosmolarity without coma, unspecified whether long term insulin use (H)      - sitagliptin (JANUVIA) 100 MG tablet; Take 1 tablet (100 mg) by mouth daily  Dispense: 30 tablet; Refill: 3      There are no Patient Instructions on file for this visit.    Orders Placed This Encounter   Procedures     KS FOOT EXAM NO CHARGE     Albumin Random Urine Quantitative with Creat Ratio     Medications Discontinued During This  "Encounter   Medication Reason     famotidine (PEPCID) 40 MG tablet Reorder     sitagliptin (JANUVIA) 100 MG tablet Reorder     famotidine (PEPCID) 40 MG tablet Reorder       Return in about 2 months (around 1/11/2022) for diabetes.    CHIEF COMPLAINT:  chief complaint    HISTORY OF PRESENT ILLNESS:  Stacey is a 41 year old female   Who is returning today to follow-up for her lab test diabetes and GERD.  She reports that she still sometimes gets abdominal burning but she is still eating spicy food the abdominal pain is less she does not have any pain at nighttime when she is sleeping she has been trying to check her blood sugars he does not think the meter is working well at 4 years old she has tolerated the Januvia continues to take Metformin checks her blood sugars regularly notes no weakness dizziness or headache.    REVIEW OF SYSTEMS:     GI occasional abdominal discomfort and burning noted which is present on 2 days a week 10 point review of  All other systems are negative.    PFSH:    Social history reviewed    TOBACCO USE:  History   Smoking Status     Never Smoker   Smokeless Tobacco     Current User     Types: Chew     Comment: chews betel nut       VITALS:  Vitals:    11/11/21 1709   BP: 98/66   BP Location: Left arm   Patient Position: Sitting   Cuff Size: Adult Regular   Pulse: 92   Resp: 16   Temp: 98.2  F (36.8  C)   TempSrc: Oral   Weight: 59 kg (130 lb)   Height: 1.486 m (4' 10.5\")     Wt Readings from Last 3 Encounters:   11/11/21 59 kg (130 lb)   10/27/21 57.8 kg (127 lb 6 oz)   09/29/21 57.7 kg (127 lb 3 oz)       PHYSICAL EXAM:    Interactive female sitting comfortably in exam room no acute distress  HEENT neck supple mucous members moist no lymph enlargement noted neck  GI the abdomen soft there is no focal tenderness.  Bowel sounds are not present there is no organomegaly  Psych oriented x3 not agitated well-groomed  Neuro digital foot exam is normal no evidence of any areas of hypoesthesia " noted  CV of peripheral pulses in the lower extremities are normal    DATA REVIEWED:  Additional History from Old Records Summarized (2): 0  Decision to Obtain Records (1): 0  Radiology Tests Summarized or Ordered (1): 0  Labs Reviewed or Ordered (1): 0  Medicine Test Summarized or Ordered (1): 0  Independent Review of EKG or X-RAY(2 each): 0    The visit lasted a total of 30 minutes .    MEDICATIONS:  Current Outpatient Medications   Medication Sig Dispense Refill     alcohol swab prep pads Use to swab area of injection/zafar as directed. 100 each 4     atorvastatin (LIPITOR) 20 MG tablet Take 1 tablet (20 mg) by mouth daily 90 tablet 3     Blood Glucose Monitoring Suppl (CONTOUR NEXT ONE) KIT 1 each once for 1 dose 1 kit 0     CONTOUR NEXT TEST test strip Use to test blood sugar 3  times daily. 100 strip 4     famotidine (PEPCID) 40 MG tablet Take 1 tablet (40 mg) by mouth daily 30 tablet 3     levonorgestrel-ethinyl estradiol (VIENVA) 0.1-20 MG-MCG tablet Take 1 tablet by mouth daily 84 tablet 3     sitagliptin (JANUVIA) 100 MG tablet Take 1 tablet (100 mg) by mouth daily 30 tablet 3

## 2021-11-12 LAB
CREAT UR-MCNC: 117 MG/DL
MICROALBUMIN UR-MCNC: 27.23 MG/DL (ref 0–1.99)
MICROALBUMIN/CREAT UR: 232.7 MG/G CR

## 2021-12-08 DIAGNOSIS — Z76.0 ENCOUNTER FOR MEDICATION REFILL: Primary | ICD-10-CM

## 2021-12-08 RX ORDER — PNV NO.95/FERROUS FUM/FOLIC AC 28MG-0.8MG
TABLET ORAL
Qty: 100 TABLET | Refills: 3 | Status: SHIPPED | OUTPATIENT
Start: 2021-12-08 | End: 2023-08-03

## 2022-02-15 ENCOUNTER — OFFICE VISIT (OUTPATIENT)
Dept: FAMILY MEDICINE | Facility: CLINIC | Age: 42
End: 2022-02-15
Payer: COMMERCIAL

## 2022-02-15 VITALS
OXYGEN SATURATION: 97 % | BODY MASS INDEX: 25.2 KG/M2 | SYSTOLIC BLOOD PRESSURE: 106 MMHG | HEART RATE: 77 BPM | TEMPERATURE: 98.3 F | RESPIRATION RATE: 16 BRPM | DIASTOLIC BLOOD PRESSURE: 72 MMHG | HEIGHT: 59 IN | WEIGHT: 125 LBS

## 2022-02-15 DIAGNOSIS — K21.00 GASTROESOPHAGEAL REFLUX DISEASE WITH ESOPHAGITIS WITHOUT HEMORRHAGE: ICD-10-CM

## 2022-02-15 DIAGNOSIS — E11.9 TYPE 2 DIABETES MELLITUS WITHOUT COMPLICATION, WITHOUT LONG-TERM CURRENT USE OF INSULIN (H): Primary | ICD-10-CM

## 2022-02-15 DIAGNOSIS — E78.5 HYPERLIPIDEMIA LDL GOAL <100: ICD-10-CM

## 2022-02-15 LAB
ALBUMIN SERPL-MCNC: 3.6 G/DL (ref 3.5–5)
ALP SERPL-CCNC: 72 U/L (ref 45–120)
ALT SERPL W P-5'-P-CCNC: 30 U/L (ref 0–45)
ANION GAP SERPL CALCULATED.3IONS-SCNC: 10 MMOL/L (ref 5–18)
AST SERPL W P-5'-P-CCNC: 19 U/L (ref 0–40)
BILIRUB SERPL-MCNC: 0.3 MG/DL (ref 0–1)
BUN SERPL-MCNC: 10 MG/DL (ref 8–22)
CALCIUM SERPL-MCNC: 9.4 MG/DL (ref 8.5–10.5)
CHLORIDE BLD-SCNC: 103 MMOL/L (ref 98–107)
CO2 SERPL-SCNC: 24 MMOL/L (ref 22–31)
CREAT SERPL-MCNC: 0.8 MG/DL (ref 0.6–1.1)
GFR SERPL CREATININE-BSD FRML MDRD: >90 ML/MIN/1.73M2
GLUCOSE BLD-MCNC: 264 MG/DL (ref 70–125)
HBA1C MFR BLD: 8.7 % (ref 0–5.6)
POTASSIUM BLD-SCNC: 3.6 MMOL/L (ref 3.5–5)
PROT SERPL-MCNC: 7 G/DL (ref 6–8)
SODIUM SERPL-SCNC: 137 MMOL/L (ref 136–145)

## 2022-02-15 PROCEDURE — 99214 OFFICE O/P EST MOD 30 MIN: CPT | Performed by: FAMILY MEDICINE

## 2022-02-15 PROCEDURE — 83036 HEMOGLOBIN GLYCOSYLATED A1C: CPT | Performed by: FAMILY MEDICINE

## 2022-02-15 PROCEDURE — 36415 COLL VENOUS BLD VENIPUNCTURE: CPT | Performed by: FAMILY MEDICINE

## 2022-02-15 PROCEDURE — 80053 COMPREHEN METABOLIC PANEL: CPT | Performed by: FAMILY MEDICINE

## 2022-02-15 ASSESSMENT — MIFFLIN-ST. JEOR: SCORE: 1134.69

## 2022-02-15 NOTE — PROGRESS NOTES
ASSESMENT AND PLAN:    Type 2 diabetes mellitus without complication, without long-term current use of insulin (H)  She is not taking Januvia daily.  Instructed to take Metformin 1000 mg twice a day and Januvia 100 mg daily.  Discussed adding insulin if needed.  She wants to restart glipizide instead of taking Januvia.  Advised to stay on Januvia for now.  She will follow-up with PCP in few weeks.  - Comprehensive metabolic panel (BMP + Alb, Alk Phos, ALT, AST, Total. Bili, TP); Future  - Hemoglobin A1c; Future    Hyperlipidemia LDL goal <100  Tolerating medication well.    Gastroesophageal reflux disease with esophagitis without hemorrhage  Stable.      This transcription uses voice recognition software, which may contain typographical errors.        SUBJECTIVE: Stacey Melendez is a 41-year-old female with history of diabetes, hyperlipidemia and GERD here for follow-up.  I am seeing her for the first time today.  She last saw Dr. Noel in 2021.  Januvia was added for diabetes.  She is also on Metformin 1000 mg twice a day.  She is not taking Januvia daily, she thought it is for her stomach and taking as needed.  Her blood sugar numbers are in the 200 range every day.  She denied nausea vomiting or abdominal pain.  She states the diabetes medication she took in 2019 before she got pregnant worked very well for her.  It was glipizide per chart review.  GERD symptoms are stable.  She takes cholesterol medication daily, no side effects reported.    Past Medical History:   Diagnosis Date     Diabetes mellitus (H)      Hypertriglyceridemia      Patient Active Problem List   Diagnosis     Bilateral carpal tunnel syndrome     Gastroesophageal reflux disease with esophagitis     Learning problem     Diabetes (H)     Hyperlipidemia LDL goal <100      (normal spontaneous vaginal delivery)       Allergies:  No Known Allergies    History   Smoking Status     Never Smoker   Smokeless Tobacco     Current User     Types:  "Chew     Comment: chews betel nut       Review of systems otherwise negative except as listed in HPI.   History   Smoking Status     Never Smoker   Smokeless Tobacco     Current User     Types: Chew     Comment: chews betel nut       OBJECTICE: /72 (BP Location: Left arm, Patient Position: Sitting, Cuff Size: Adult Regular)   Pulse 77   Temp 98.3  F (36.8  C) (Oral)   Resp 16   Ht 1.486 m (4' 10.5\")   Wt 56.7 kg (125 lb)   LMP 02/07/2022   SpO2 97%   BMI 25.68 kg/m      DATA REVIEWED:  Additional History from Old Records Summarized (2):  Labs Reviewed or Ordered (1):      GEN-alert,  in no apparent distress.  HEENT- neck is supple.  CV-regular rate and rhythm with no murmur.   RESP-lungs clear to auscultation .  ABDOMEN- Soft , not tender.  EXTREM- No edema. No open lesions or ulcers. Sensation intact.   SKIN-normal        Vinny Santiago MD   2/15/2022   "

## 2022-02-28 NOTE — PROGRESS NOTES
"Please see \"Imaging\" tab under \"Chart Review\" for details of today's visit.    Bakari Curran        " Cimetidine Pregnancy And Lactation Text: This medication is Pregnancy Category B and is considered safe during pregnancy. It is also excreted in breast milk and breast feeding isn't recommended.

## 2022-03-17 ENCOUNTER — OFFICE VISIT (OUTPATIENT)
Dept: FAMILY MEDICINE | Facility: CLINIC | Age: 42
End: 2022-03-17
Payer: COMMERCIAL

## 2022-03-17 VITALS
OXYGEN SATURATION: 98 % | HEART RATE: 96 BPM | SYSTOLIC BLOOD PRESSURE: 110 MMHG | TEMPERATURE: 98.4 F | DIASTOLIC BLOOD PRESSURE: 76 MMHG | HEIGHT: 59 IN | BODY MASS INDEX: 25.43 KG/M2 | WEIGHT: 126.13 LBS

## 2022-03-17 DIAGNOSIS — Z11.59 NEED FOR HEPATITIS C SCREENING TEST: Primary | ICD-10-CM

## 2022-03-17 DIAGNOSIS — K21.00 GASTROESOPHAGEAL REFLUX DISEASE WITH ESOPHAGITIS WITHOUT HEMORRHAGE: ICD-10-CM

## 2022-03-17 DIAGNOSIS — M77.12 LATERAL EPICONDYLITIS OF LEFT ELBOW: ICD-10-CM

## 2022-03-17 DIAGNOSIS — O24.112 PRE-EXISTING TYPE 2 DIABETES MELLITUS DURING PREGNANCY IN SECOND TRIMESTER: ICD-10-CM

## 2022-03-17 DIAGNOSIS — E08.00 DIABETES MELLITUS DUE TO UNDERLYING CONDITION WITH HYPEROSMOLARITY WITHOUT COMA, WITHOUT LONG-TERM CURRENT USE OF INSULIN (H): ICD-10-CM

## 2022-03-17 PROCEDURE — 36415 COLL VENOUS BLD VENIPUNCTURE: CPT | Performed by: FAMILY MEDICINE

## 2022-03-17 PROCEDURE — 99214 OFFICE O/P EST MOD 30 MIN: CPT | Performed by: FAMILY MEDICINE

## 2022-03-17 PROCEDURE — 86803 HEPATITIS C AB TEST: CPT | Performed by: FAMILY MEDICINE

## 2022-03-17 RX ORDER — MELOXICAM 15 MG/1
15 TABLET ORAL DAILY
Qty: 30 TABLET | Refills: 1 | Status: SHIPPED | OUTPATIENT
Start: 2022-03-17 | End: 2022-06-17

## 2022-03-17 RX ORDER — LEVONORGESTREL/ETHIN.ESTRADIOL 0.1-0.02MG
1 TABLET ORAL DAILY
Qty: 84 TABLET | Refills: 3 | Status: SHIPPED | OUTPATIENT
Start: 2022-03-17 | End: 2023-05-26

## 2022-03-17 NOTE — PATIENT INSTRUCTIONS
Patient Education     Understanding Lateral Epicondylitis     Tendons are strong bands of tissue that connect muscles to bones. Lateral epicondylitis affects the tendons that connect muscles in the forearm to the lateral epicondyle. This is the bony knob on the outer side of the elbow. The condition occurs if the extensor tendons of the wrist become painful and swollen (irritated). This can cause pain in the elbow, forearm, and wrist. Because the condition is sometimes caused by playing tennis, it's also known as  tennis elbow.     How to say it  LA-tuhr-tamika [id number]   What causes lateral epicondylitis?  The condition most often occurs because of overuse. This can be from any activity that repeatedly puts stress on the forearm extensor muscles or tendons and wrist. For instance, playing tennis, lifting weights, cutting meat, painting, and typing can all cause the condition. Wear and tear of the tendons from aging or an injury to the tendons can also cause the condition.   Symptoms of lateral epicondylitis  The most common symptom is pain. You may feel it on the outer side of the elbow and down the back of the forearm. It may be worse when moving or using the elbow, forearm, or wrist. You may also feel pain when gripping or lifting things.   Treatment for lateral epicondylitis  Treatments may include:    Resting the elbow, forearm, and wrist. You ll need to avoid movements that can make your symptoms worse. You also may need to avoid certain sports and types of work for a time. This helps relieve symptoms and prevent further damage to the tendons.    Changing the action that caused the problem. For instance, if the tendons were damaged from playing tennis, it may help to change your playing technique or use different equipment. This helps prevent further damage to the tendons.    Using cold packs. Putting an ice pack on the injured area can help reduce pain and swelling.    Taking pain medicines. Taking  prescription or over-the-counter pain medicines may help reduce pain and swelling.      Wearing a brace. This helps reduce strain on the muscles and tendons in the forearm, which may relieve symptoms. It's very important to wear the brace properly.    Doing exercises and physical therapy. These help improve strength and range of motion in the elbow, forearm, and wrist.    Getting shots of medicine into the injured area. These may help relieve symptoms for a time.    Having surgery. This may be an option if other treatments fail to relieve symptoms. In many cases, the surgeon removes the damaged tissue.  Possible complications of lateral epicondylitis  If the tendons involved don t heal properly, symptoms may return or get worse. To help prevent this, follow your treatment plan as directed.   When to call your healthcare provider  Call your healthcare provider right away if you have any of these:    Fever of 100.4 F (38 C) or higher, or as directed by your provider    Chills    Redness, swelling, or warmth in the elbow or forearm that gets worse    Symptoms that don t get better with treatment, or get worse    New symptoms  Tom last reviewed this educational content on 6/1/2019 2000-2021 [employer]. All rights reserved. This information is not intended as a substitute for professional medical care. Always follow your healthcare professional's instructions.

## 2022-03-17 NOTE — PROGRESS NOTES
ASSESSMENT and plan   1. Routine postpartum follow-up      - levonorgestrel-ethinyl estradiol (VIENVA) 0.1-20 MG-MCG tablet; Take 1 tablet by mouth daily  Dispense: 84 tablet; Refill: 3    2. Need for hepatitis C screening test      - Hepatitis C Screen Reflex to HCV RNA Quant and Genotype; Future  - Hepatitis C Screen Reflex to HCV RNA Quant and Genotype    3. Pre-existing type 2 diabetes mellitus during pregnancy in second trimester      - OPTOMETRY REFERRAL; Future    4. Gastroesophageal reflux disease with esophagitis without hemorrhage    Abdominal pain no reflux-like symptoms noted.    5. Lateral epicondylitis of left elbow  Pain noted lateral epicondyle for last 4 weeks no trauma noted she will try meloxicam and try an ice fracture by Biofreeze if her symptoms continue we will proceed with a steroid injection  - meloxicam (MOBIC) 15 MG tablet; Take 1 tablet (15 mg) by mouth daily  Dispense: 30 tablet; Refill: 1      6.  Type 2 diabetes.  She had not been taking the Metformin and Januvia as prescribed she is not doing that last A1c was 8.7 we will recheck in approximately 8 weeks.  Blood sugars typically range between 114 230 in the morning.      Patient Instructions   Patient Education     Understanding Lateral Epicondylitis     Tendons are strong bands of tissue that connect muscles to bones. Lateral epicondylitis affects the tendons that connect muscles in the forearm to the lateral epicondyle. This is the bony knob on the outer side of the elbow. The condition occurs if the extensor tendons of the wrist become painful and swollen (irritated). This can cause pain in the elbow, forearm, and wrist. Because the condition is sometimes caused by playing tennis, it's also known as  tennis elbow.     How to say it  LA-tuhr-tamika [id number]   What causes lateral epicondylitis?  The condition most often occurs because of overuse. This can be from any activity that repeatedly puts stress on the forearm  extensor muscles or tendons and wrist. For instance, playing tennis, lifting weights, cutting meat, painting, and typing can all cause the condition. Wear and tear of the tendons from aging or an injury to the tendons can also cause the condition.   Symptoms of lateral epicondylitis  The most common symptom is pain. You may feel it on the outer side of the elbow and down the back of the forearm. It may be worse when moving or using the elbow, forearm, or wrist. You may also feel pain when gripping or lifting things.   Treatment for lateral epicondylitis  Treatments may include:    Resting the elbow, forearm, and wrist. You ll need to avoid movements that can make your symptoms worse. You also may need to avoid certain sports and types of work for a time. This helps relieve symptoms and prevent further damage to the tendons.    Changing the action that caused the problem. For instance, if the tendons were damaged from playing tennis, it may help to change your playing technique or use different equipment. This helps prevent further damage to the tendons.    Using cold packs. Putting an ice pack on the injured area can help reduce pain and swelling.    Taking pain medicines. Taking prescription or over-the-counter pain medicines may help reduce pain and swelling.      Wearing a brace. This helps reduce strain on the muscles and tendons in the forearm, which may relieve symptoms. It's very important to wear the brace properly.    Doing exercises and physical therapy. These help improve strength and range of motion in the elbow, forearm, and wrist.    Getting shots of medicine into the injured area. These may help relieve symptoms for a time.    Having surgery. This may be an option if other treatments fail to relieve symptoms. In many cases, the surgeon removes the damaged tissue.  Possible complications of lateral epicondylitis  If the tendons involved don t heal properly, symptoms may return or get worse. To help  prevent this, follow your treatment plan as directed.   When to call your healthcare provider  Call your healthcare provider right away if you have any of these:    Fever of 100.4 F (38 C) or higher, or as directed by your provider    Chills    Redness, swelling, or warmth in the elbow or forearm that gets worse    Symptoms that don t get better with treatment, or get worse    New symptoms  [employer] last reviewed this educational content on 6/1/2019 2000-2021 [employer]. All rights reserved. This information is not intended as a substitute for professional medical care. Always follow your healthcare professional's instructions.               Orders Placed This Encounter   Procedures     REVIEW OF HEALTH MAINTENANCE PROTOCOL ORDERS     Hepatitis C Screen Reflex to HCV RNA Quant and Genotype     OPTOMETRY REFERRAL     Medications Discontinued During This Encounter   Medication Reason     levonorgestrel-ethinyl estradiol (VIENVA) 0.1-20 MG-MCG tablet Reorder       Return in about 3 months (around 6/17/2022) for diabetes.    CHIEF COMPLAINT:  chief complaint follow-up for diabetes questions regarding elbow pain    HISTORY OF PRESENT ILLNESS:  Stacey is a 41 year old female who is here for a follow-up she saw  approximately 1 month ago.  She now is taking her Januvia once a day and Metformin twice a day.  She reports her blood sugars typically are in the 145 range when they get below 115 she feels tired no dizzy spells eyesight is normal she says she is also had some left elbow pain for about a month she denies any injuries to the site.  The pain in the left elbow is worse when she touches the area she is never had this pain before.    REVIEW OF SYSTEMS:     Other than mentioned in the HPI 10 point review of  All other systems are negative.    PFSH:    Social history reviewed    TOBACCO USE:  History   Smoking Status     Never Smoker   Smokeless Tobacco     Current User     Types: Chew     Comment:  "chews betel nut       VITALS:  Vitals:    03/17/22 1624   BP: 110/76   Pulse: 96   Temp: 98.4  F (36.9  C)   TempSrc: Oral   SpO2: 98%   Weight: 57.2 kg (126 lb 2 oz)   Height: 1.486 m (4' 10.5\")     Wt Readings from Last 3 Encounters:   03/17/22 57.2 kg (126 lb 2 oz)   02/15/22 56.7 kg (125 lb)   11/11/21 59 kg (130 lb)       PHYSICAL EXAM:  Interactive middle-aged lady sitting comfortably exam no acute distress  CVS regular rate and rhythm no murmurs rubs gallops appreciated  Respiratory system clear to auscultation good breath sounds no wheeze no crackles  CNS cranial 2-12 intact gait is normal reflexes are brisk  Musculoskeletal system tenderness elicited when I palpate the lateral epicondyle of the left elbow.    DATA REVIEWED:  Additional History from Old Records Summarized (2): 0  Decision to Obtain Records (1): 0  Radiology Tests Summarized or Ordered (1): 0  Labs Reviewed or Ordered (1): 0  Medicine Test Summarized or Ordered (1): 0  Independent Review of EKG or X-RAY(2 each): 0    The visit lasted a total of 31 minutes .    MEDICATIONS:  Current Outpatient Medications   Medication Sig Dispense Refill     levonorgestrel-ethinyl estradiol (VIENVA) 0.1-20 MG-MCG tablet Take 1 tablet by mouth daily 84 tablet 3     meloxicam (MOBIC) 15 MG tablet Take 1 tablet (15 mg) by mouth daily 30 tablet 1     alcohol swab prep pads Use to swab area of injection/zafar as directed. 100 each 4     atorvastatin (LIPITOR) 20 MG tablet Take 1 tablet (20 mg) by mouth daily 90 tablet 3     CONTOUR NEXT TEST test strip Use to test blood sugar 3  times daily. 100 strip 4     famotidine (PEPCID) 40 MG tablet Take 1 tablet (40 mg) by mouth daily 30 tablet 3     Prenatal Vit-Fe Fumarate-FA (PRENATAL VITAMINS) 28-0.8 MG TABS TAKE 1 TABLET BY MOUTH DAILY. 100 tablet 3     sitagliptin (JANUVIA) 100 MG tablet Take 1 tablet (100 mg) by mouth daily 30 tablet 3       "

## 2022-03-18 LAB — HCV AB SERPL QL IA: NONREACTIVE

## 2022-06-17 ENCOUNTER — OFFICE VISIT (OUTPATIENT)
Dept: FAMILY MEDICINE | Facility: CLINIC | Age: 42
End: 2022-06-17
Payer: COMMERCIAL

## 2022-06-17 VITALS
TEMPERATURE: 98.4 F | DIASTOLIC BLOOD PRESSURE: 70 MMHG | BODY MASS INDEX: 25.31 KG/M2 | SYSTOLIC BLOOD PRESSURE: 104 MMHG | HEART RATE: 89 BPM | WEIGHT: 125.56 LBS | HEIGHT: 59 IN | OXYGEN SATURATION: 98 %

## 2022-06-17 DIAGNOSIS — K29.00 ACUTE SUPERFICIAL GASTRITIS WITHOUT HEMORRHAGE: ICD-10-CM

## 2022-06-17 DIAGNOSIS — E78.1 PURE HYPERTRIGLYCERIDEMIA: ICD-10-CM

## 2022-06-17 DIAGNOSIS — E78.5 HYPERLIPIDEMIA LDL GOAL <100: Primary | ICD-10-CM

## 2022-06-17 DIAGNOSIS — M77.12 LATERAL EPICONDYLITIS OF LEFT ELBOW: ICD-10-CM

## 2022-06-17 DIAGNOSIS — E08.00 DIABETES MELLITUS DUE TO UNDERLYING CONDITION WITH HYPEROSMOLARITY WITHOUT COMA, UNSPECIFIED WHETHER LONG TERM INSULIN USE (H): ICD-10-CM

## 2022-06-17 DIAGNOSIS — K21.00 GASTROESOPHAGEAL REFLUX DISEASE WITH ESOPHAGITIS WITHOUT HEMORRHAGE: ICD-10-CM

## 2022-06-17 DIAGNOSIS — Z11.59 NEED FOR HEPATITIS C SCREENING TEST: ICD-10-CM

## 2022-06-17 DIAGNOSIS — K12.0 APHTHAE, ORAL: ICD-10-CM

## 2022-06-17 LAB — HBA1C MFR BLD: 7.2 % (ref 0–5.6)

## 2022-06-17 PROCEDURE — 83036 HEMOGLOBIN GLYCOSYLATED A1C: CPT | Performed by: FAMILY MEDICINE

## 2022-06-17 PROCEDURE — 99214 OFFICE O/P EST MOD 30 MIN: CPT | Performed by: FAMILY MEDICINE

## 2022-06-17 PROCEDURE — 36415 COLL VENOUS BLD VENIPUNCTURE: CPT | Performed by: FAMILY MEDICINE

## 2022-06-17 RX ORDER — ATORVASTATIN CALCIUM 20 MG/1
20 TABLET, FILM COATED ORAL DAILY
Qty: 90 TABLET | Refills: 3 | Status: SHIPPED | OUTPATIENT
Start: 2022-06-17 | End: 2023-01-05

## 2022-06-17 RX ORDER — FAMOTIDINE 40 MG/1
40 TABLET, FILM COATED ORAL DAILY
Qty: 30 TABLET | Refills: 11 | Status: SHIPPED | OUTPATIENT
Start: 2022-06-17 | End: 2023-07-02

## 2022-06-17 RX ORDER — MELOXICAM 15 MG/1
15 TABLET ORAL DAILY
Qty: 30 TABLET | Refills: 11 | Status: SHIPPED | OUTPATIENT
Start: 2022-06-17 | End: 2023-05-30

## 2022-06-17 RX ORDER — CALCIUM CARBONATE 500 MG/1
1 TABLET, CHEWABLE ORAL 3 TIMES DAILY
Qty: 90 TABLET | Refills: 4 | Status: SHIPPED | OUTPATIENT
Start: 2022-06-17 | End: 2023-08-03

## 2022-06-17 NOTE — RESULT ENCOUNTER NOTE
Please inform patient that sugar control is much better she should continue on her current medications and see me in 4 months

## 2022-06-17 NOTE — PROGRESS NOTES
"  Assessment & Plan     Hyperlipidemia LDL goal <100  We will check cholesterol at next visit.    Diabetes mellitus due to underlying condition with hyperosmolarity without coma, unspecified whether long term insulin use (H)    She will have her son schedule an eye doctor visit for her.  A1c today was 7.2 blood sugars are decreasing she has no dizzy spells occasionally feels weak after eating.  I reviewed her ambulatory glucometer and its 165.  Continue current dose of metformin and Januvia.  - OPTOMETRY REFERRAL; Future  - Hemoglobin A1c; Future  - sitagliptin (JANUVIA) 100 MG tablet; Take 1 tablet (100 mg) by mouth daily  - metFORMIN (GLUCOPHAGE) 1000 MG tablet; Take 1 tablet (1,000 mg) by mouth 2 times daily (with meals)  - Hemoglobin A1c    Need for hepatitis C screening test    Agrees for test    Lateral epicondylitis of left elbow    Improvement in left elbow pain she did not use ice or Biofreeze have asked her to do this I refilled her Mobic.  I will see her again in 4 months.  - meloxicam (MOBIC) 15 MG tablet; Take 1 tablet (15 mg) by mouth daily    Gastroesophageal reflux disease with esophagitis without hemorrhage    Occasional abdominal burning noted she is on an H2 blocker and adding Tums to her regimen she can use it after meals  - calcium carbonate (TUMS) 500 MG chewable tablet; Take 1 tablet (500 mg) by mouth 3 times daily    Acute superficial gastritis without hemorrhage      - famotidine (PEPCID) 40 MG tablet; Take 1 tablet (40 mg) by mouth daily    Pure hypertriglyceridemia    Medication reviewed no side effects noted  - atorvastatin (LIPITOR) 20 MG tablet; Take 1 tablet (20 mg) by mouth daily    Aphthae, oral    1 oral aphthous ulcer noted no burning no pain of asked her not to chew betel nut which may irritate the area           BMI:   Estimated body mass index is 25.8 kg/m  as calculated from the following:    Height as of this encounter: 1.486 m (4' 10.5\").    Weight as of this encounter: 57 kg " "(125 lb 9 oz).         Carlos Noel MD  [facility] SARABJIT Ibarra is a 42 year old{, presenting for the following health issues:  Diabetes    Cholesterol issues left elbow pain    Patient is a 42-year-old female with diabetes.  She has left elbow pain which was treated with an anti-inflammatory she reports the pain is better she can now flex and extend her elbow but she never used an ice pack pain his symptoms are after a lot of kitchen work.  She reports that sometimes she feels shaky after eating meals and has been checking her sugars primarily after supper.  No headaches noted at night no dizzy spells noted.  She is taking the cholesterol medication she has not noticed any muscle cramps.      History of Present Illness       Diabetes:   She presents for follow up of diabetes.  She is checking home blood glucose two times daily. She checks blood glucose before meals.  Blood glucose is sometimes over 200 and never under 70. When her blood glucose is low, the patient is asymptomatic for confusion, blurred vision, lethargy and reports not feeling dizzy, shaky, or weak.  She has no concerns regarding her diabetes at this time.  She is not experiencing numbness or burning in feet, excessive thirst, blurry vision, weight changes or redness, sores or blisters on feet. The patient has not had a diabetic eye exam in the last 12 months.               Review of Systems   Constitutional, HEENT, cardiovascular, pulmonary, gi and gu systems are negative, except as otherwise noted.      Objective    /70   Pulse 89   Temp 98.4  F (36.9  C) (Oral)   Ht 1.486 m (4' 10.5\")   Wt 57 kg (125 lb 9 oz)   LMP 06/07/2022 (Exact Date)   SpO2 98%   BMI 25.80 kg/m    Body mass index is 25.8 kg/m .  Physical Exam   GENERAL: healthy, alert and no distress  EYES: Eyes grossly normal to inspection, PERRL and conjunctivae and sclerae normal  RESP: lungs clear to auscultation - no rales, rhonchi or " wheezes  CV: regular rate and rhythm, normal S1 S2, no S3 or S4, no murmur, click or rub, no peripheral edema and peripheral pulses strong  NEURO: Normal strength and tone, mentation intact and speech normal  PSYCH: mentation appears normal, affect normal/bright                .  ..

## 2022-11-03 ENCOUNTER — OFFICE VISIT (OUTPATIENT)
Dept: FAMILY MEDICINE | Facility: CLINIC | Age: 42
End: 2022-11-03
Payer: COMMERCIAL

## 2022-11-03 VITALS
WEIGHT: 124 LBS | HEIGHT: 59 IN | BODY MASS INDEX: 25 KG/M2 | OXYGEN SATURATION: 98 % | HEART RATE: 81 BPM | TEMPERATURE: 98 F | RESPIRATION RATE: 20 BRPM | SYSTOLIC BLOOD PRESSURE: 110 MMHG | DIASTOLIC BLOOD PRESSURE: 80 MMHG

## 2022-11-03 DIAGNOSIS — Z13.220 SCREENING FOR HYPERLIPIDEMIA: ICD-10-CM

## 2022-11-03 DIAGNOSIS — E78.1 PURE HYPERTRIGLYCERIDEMIA: ICD-10-CM

## 2022-11-03 DIAGNOSIS — Z23 ENCOUNTER FOR IMMUNIZATION: ICD-10-CM

## 2022-11-03 DIAGNOSIS — M77.12 LATERAL EPICONDYLITIS OF LEFT ELBOW: ICD-10-CM

## 2022-11-03 DIAGNOSIS — E11.9 TYPE 2 DIABETES MELLITUS WITHOUT COMPLICATION, WITHOUT LONG-TERM CURRENT USE OF INSULIN (H): Primary | ICD-10-CM

## 2022-11-03 DIAGNOSIS — L20.82 FLEXURAL ECZEMA: ICD-10-CM

## 2022-11-03 DIAGNOSIS — K21.00 GASTROESOPHAGEAL REFLUX DISEASE WITH ESOPHAGITIS WITHOUT HEMORRHAGE: ICD-10-CM

## 2022-11-03 LAB
CREAT UR-MCNC: 132 MG/DL
HBA1C MFR BLD: 6.9 % (ref 0–5.6)
MICROALBUMIN UR-MCNC: 120 MG/L
MICROALBUMIN/CREAT UR: 90.91 MG/G CR (ref 0–25)

## 2022-11-03 PROCEDURE — 80061 LIPID PANEL: CPT | Performed by: FAMILY MEDICINE

## 2022-11-03 PROCEDURE — 0124A COVID-19,PF,PFIZER BOOSTER BIVALENT: CPT | Performed by: FAMILY MEDICINE

## 2022-11-03 PROCEDURE — 90686 IIV4 VACC NO PRSV 0.5 ML IM: CPT | Performed by: FAMILY MEDICINE

## 2022-11-03 PROCEDURE — 91312 COVID-19,PF,PFIZER BOOSTER BIVALENT: CPT | Performed by: FAMILY MEDICINE

## 2022-11-03 PROCEDURE — 99214 OFFICE O/P EST MOD 30 MIN: CPT | Mod: 25 | Performed by: FAMILY MEDICINE

## 2022-11-03 PROCEDURE — 90471 IMMUNIZATION ADMIN: CPT | Performed by: FAMILY MEDICINE

## 2022-11-03 PROCEDURE — 36415 COLL VENOUS BLD VENIPUNCTURE: CPT | Performed by: FAMILY MEDICINE

## 2022-11-03 PROCEDURE — 82043 UR ALBUMIN QUANTITATIVE: CPT | Performed by: FAMILY MEDICINE

## 2022-11-03 PROCEDURE — 83036 HEMOGLOBIN GLYCOSYLATED A1C: CPT | Performed by: FAMILY MEDICINE

## 2022-11-03 RX ORDER — CLOBETASOL PROPIONATE 0.5 MG/G
OINTMENT TOPICAL 2 TIMES DAILY
Qty: 60 G | Refills: 4 | Status: SHIPPED | OUTPATIENT
Start: 2022-11-03 | End: 2023-08-03

## 2022-11-03 NOTE — PROGRESS NOTES
Assessment & Plan     Type 2 diabetes mellitus without complication, without long-term current use of insulin (H)    Patient brought her meter in today blood sugars range between the 100 217 in the morning last A1c was 7.2 we will repeat that today.  She has no problem getting access to her medications.  - Adult Eye  Referral; Future  - Albumin Random Urine Quantitative with Creat Ratio; Future  - Hemoglobin A1c; Future  - Albumin Random Urine Quantitative with Creat Ratio  - Hemoglobin A1c    Pure hypertriglyceridemia    On atorvastatin no side effects noted    Gastroesophageal reflux disease with esophagitis without hemorrhage    Occasional reflux symptoms noted on famotidine does not eat spicy food.    Screening for hyperlipidemia    Lipid panel will be drawn today  - Lipid panel reflex to direct LDL Non-fasting; Future  - Lipid panel reflex to direct LDL Non-fasting    Lateral epicondylitis of left elbow    Mobic Mixed results with her lateral epicondylitis she has had this for several months I offered to treat her today with a steroid injection but she is wary of injections she would like to try another agent I will have her stop the Mobic and try Voltaren gel.  She will return within 2 months of her symptoms are still the same we will try a steroid injection at that time  - diclofenac (VOLTAREN) 1 % topical gel; Apply 4 g topically 4 times daily    Flexural eczema    She has a skin rash near the right elbow which has been persistent for more than 3 and half months it is very itchy she denies having this rash anywhere else.  - clobetasol (TEMOVATE) 0.05 % external ointment; Apply topically 2 times daily    Encounter for immunization    She agrees to immunizations today potential side effects discussed                   Return in 2 months  Carlos Noel MD  [facility] SARABJIT Humphreys   Wilner is a 42 year old, presenting for the following health issues:  follow up  (Pre dm )      HPI  "  42-year-old female here for follow-up of diabetes and hyperlipidemia she also has a skin rash near her right elbow her left elbow continues to give her discomfort.  She says she has been measuring her blood sugars.  Medication does help with the left elbow however she needs to take it every day she says the elbow area is tender and paining all the time she denies any injuries to the site.  She discovered a rash for the last 3 months in her right elbow it is very itchy and she has been scratching it mainly at night.        Review of Systems   Constitutional, HEENT, cardiovascular, pulmonary, gi and gu systems are negative, except as otherwise noted.      Objective    /80 (BP Location: Left arm, Patient Position: Sitting, Cuff Size: Adult Regular)   Pulse 81   Temp 98  F (36.7  C) (Oral)   Resp 20   Ht 1.486 m (4' 10.5\")   Wt 56.2 kg (124 lb)   SpO2 98%   BMI 25.47 kg/m    Body mass index is 25.47 kg/m .  Physical Exam   GENERAL: healthy, alert and no distress  EYES: Eyes grossly normal to inspection, PERRL and conjunctivae and sclerae normal  NECK: no adenopathy, no asymmetry, masses, or scars and thyroid normal to palpation  RESP: lungs clear to auscultation - no rales, rhonchi or wheezes  CV: regular rate and rhythm, normal S1 S2, no S3 or S4, no murmur, click or rub, no peripheral edema and peripheral pulses strong  MS: Tenderness elicited when I palpate the lateral epicondyle of the left elbow  SKIN: Localized thickened rash present near the right elbow measuring 2 x 3 cm signs of excoriation are present there is no erythema no associated lesions  NEURO: Normal strength and tone, mentation intact and speech normal  PSYCH: mentation appears normal, affect normal/bright                    "

## 2022-11-04 LAB
CHOLEST SERPL-MCNC: 120 MG/DL
HDLC SERPL-MCNC: 44 MG/DL
LDLC SERPL CALC-MCNC: 51 MG/DL
NONHDLC SERPL-MCNC: 76 MG/DL
TRIGL SERPL-MCNC: 125 MG/DL

## 2023-01-05 ENCOUNTER — OFFICE VISIT (OUTPATIENT)
Dept: FAMILY MEDICINE | Facility: CLINIC | Age: 43
End: 2023-01-05
Payer: COMMERCIAL

## 2023-01-05 VITALS
DIASTOLIC BLOOD PRESSURE: 70 MMHG | RESPIRATION RATE: 18 BRPM | SYSTOLIC BLOOD PRESSURE: 118 MMHG | HEIGHT: 59 IN | WEIGHT: 126.25 LBS | OXYGEN SATURATION: 99 % | TEMPERATURE: 98.3 F | HEART RATE: 74 BPM | BODY MASS INDEX: 25.45 KG/M2

## 2023-01-05 DIAGNOSIS — E08.00 DIABETES MELLITUS DUE TO UNDERLYING CONDITION WITH HYPEROSMOLARITY WITHOUT COMA, UNSPECIFIED WHETHER LONG TERM INSULIN USE (H): ICD-10-CM

## 2023-01-05 DIAGNOSIS — L20.82 FLEXURAL ECZEMA: ICD-10-CM

## 2023-01-05 DIAGNOSIS — M77.12 LATERAL EPICONDYLITIS OF LEFT ELBOW: ICD-10-CM

## 2023-01-05 DIAGNOSIS — E11.69 TYPE 2 DIABETES MELLITUS WITH OTHER SPECIFIED COMPLICATION, WITHOUT LONG-TERM CURRENT USE OF INSULIN (H): ICD-10-CM

## 2023-01-05 DIAGNOSIS — E78.5 HYPERLIPIDEMIA LDL GOAL <100: Primary | ICD-10-CM

## 2023-01-05 DIAGNOSIS — Z23 ENCOUNTER FOR IMMUNIZATION: ICD-10-CM

## 2023-01-05 DIAGNOSIS — E78.1 PURE HYPERTRIGLYCERIDEMIA: ICD-10-CM

## 2023-01-05 PROCEDURE — 90471 IMMUNIZATION ADMIN: CPT | Performed by: FAMILY MEDICINE

## 2023-01-05 PROCEDURE — 90677 PCV20 VACCINE IM: CPT | Performed by: FAMILY MEDICINE

## 2023-01-05 PROCEDURE — 99214 OFFICE O/P EST MOD 30 MIN: CPT | Mod: 25 | Performed by: FAMILY MEDICINE

## 2023-01-05 RX ORDER — ATORVASTATIN CALCIUM 20 MG/1
20 TABLET, FILM COATED ORAL DAILY
Qty: 90 TABLET | Refills: 3 | Status: SHIPPED | OUTPATIENT
Start: 2023-01-05 | End: 2023-12-29

## 2023-01-05 NOTE — PROGRESS NOTES
"  Assessment & Plan     Type 2 diabetes mellitus with other specified complication, without long-term current use of insulin (H)  A1c has been below 7.4 she ran out of her Januvia and refilling that I am refilling her metformin she notes no side effects of medication    Hyperlipidemia LDL goal <100    Rechecking cholesterol in 6 months.  She has been tolerating her statin.    Lateral epicondylitis of left elbow    She says the elbow pain is much improved.  She no longer has pain when she is using her left elbow except at night with repetitive use she uses Voltaren twice daily she like a refill  - diclofenac (VOLTAREN) 1 % topical gel; Apply 4 g topically 4 times daily    Flexural eczema    The eczema has resolved she still has some of the steroid cream at home    Encounter for immunization    Agrees for immunization today    Diabetes mellitus due to underlying condition with hyperosmolarity without coma, unspecified whether long term insulin use (H)      - metFORMIN (GLUCOPHAGE) 1000 MG tablet; Take 1 tablet (1,000 mg) by mouth 2 times daily (with meals)  - sitagliptin (JANUVIA) 100 MG tablet; Take 1 tablet (100 mg) by mouth daily    Pure hypertriglyceridemia      - atorvastatin (LIPITOR) 20 MG tablet; Take 1 tablet (20 mg) by mouth daily             BMI:   Estimated body mass index is 25.94 kg/m  as calculated from the following:    Height as of this encounter: 1.486 m (4' 10.5\").    Weight as of this encounter: 57.3 kg (126 lb 4 oz).           Return in about 4 months (around 5/5/2023) for diabetes.    Carlos Noel MD  [facility] is a 42 year old, presenting for the following health issues:  Diabetes      HPI   42-year-old female here for follow-up.  She has a history of type 2 diabetes hyperlipidemia and recently saw me because of her elbow pain on the left elbow and skin rash.  She reports that the skin rash is now gone on her right elbow and the elbow pain is much " "better she has been checking her blood sugars range between 130 250.        Review of Systems   Constitutional, HEENT, cardiovascular, pulmonary, gi and gu systems are negative, except as otherwise noted.      Objective    /70   Pulse 74   Temp 98.3  F (36.8  C) (Oral)   Resp 18   Ht 1.486 m (4' 10.5\")   Wt 57.3 kg (126 lb 4 oz)   SpO2 99%   BMI 25.94 kg/m    Body mass index is 25.94 kg/m .  Physical Exam   GENERAL: healthy, alert and no distress  EYES: Eyes grossly normal to inspection, PERRL and conjunctivae and sclerae normal  RESP: lungs clear to auscultation - no rales, rhonchi or wheezes  CV: regular rate and rhythm, normal S1 S2, no S3 or S4, no murmur, click or rub, no peripheral edema and peripheral pulses strong  SKIN: no suspicious lesions or rashes  NEURO: Normal strength and tone, mentation intact and speech normal          "

## 2023-01-16 NOTE — TELEPHONE ENCOUNTER
-currently blood pressure appears well controlled   -reports has not taken any of her medications over the past week   -holding off on initiating medications for now so as not to drop her too low, although she does become hypertensive would resume her home carvedilol and hydralazine   Refill Approved    Rx renewed per Medication Renewal Policy. Medication was last renewed on 5/16/19.3/7/19    Miryam Mora, [facility] Triage/Med Refill 9/4/2019     Requested Prescriptions   Pending Prescriptions Disp Refills     glipiZIDE (GLUCOTROL XL) 10 MG 24 hr tablet [Pharmacy Med Name: GLIPIZIDE XL 10 MG TABLET 10 TAB] 60 tablet 3     Sig: TAKE 1 TABLET (10 MG TOTAL) BY MOUTH 2 (TWO) TIMES A DAY WITH MEALS FOR DIABETES       Oral Hypoglycemics Refill Protocol Passed - 9/4/2019 10:31 AM        Passed - Visit with PCP or prescribing provider visit in last 6 months       Last office visit with prescriber/PCP: 5/9/2019 OR same dept: 5/9/2019 Carlos Noel MD OR same specialty: 5/9/2019 Carlos Noel MD Last physical: Visit date not found Last MTM visit: Visit date not found         Next appt within 3 mo: Visit date not found  Next physical within 3 mo: Visit date not found  Prescriber OR PCP: Carlos Noel MD  Last diagnosis associated with med order: 1. Type 2 diabetes mellitus without complication, without long-term current use of insulin (H)  - glipiZIDE (GLUCOTROL XL) 10 MG 24 hr tablet [Pharmacy Med Name: GLIPIZIDE XL 10 MG TABLET 10 TAB]; TAKE 1 TABLET (10 MG TOTAL) BY MOUTH 2 (TWO) TIMES A DAY WITH MEALS FOR DIABETES  Dispense: 60 tablet; Refill: 3  - lisinopril (PRINIVIL,ZESTRIL) 2.5 MG tablet [Pharmacy Med Name: LISINOPRIL 2.5 MG TABS 2.5 TAB]; TAKE 1 TABLET (2.5 MG TOTAL) BY MOUTH DAILY FOR BLOOD PRESSURE  Dispense: 90 tablet; Refill: 1     If protocol passes may refill for 12 months if within 3 months of last provider visit (or a total of 15 months).           Passed - A1C in last 6 months     Hemoglobin A1c   Date Value Ref Range Status   08/27/2019 8.7 (H) 3.5 - 6.0 % Final               Passed - Microalbumin in last year      Microalbumin, Random Urine   Date Value Ref Range Status   05/09/2019 26.30 (H) 0.00 - 1.99 mg/dL Final                  Passed - Blood pressure in last year     BP  Readings from Last 1 Encounters:   08/27/19 102/72             Passed - Serum creatinine in last year     Creatinine   Date Value Ref Range Status   04/12/2019 0.75 0.60 - 1.10 mg/dL Final             lisinopril (PRINIVIL,ZESTRIL) 2.5 MG tablet [Pharmacy Med Name: LISINOPRIL 2.5 MG TABS 2.5 TAB] 90 tablet 1     Sig: TAKE 1 TABLET (2.5 MG TOTAL) BY MOUTH DAILY FOR BLOOD PRESSURE       Ace Inhibitors Refill Protocol Passed - 9/4/2019 10:31 AM        Passed - PCP or prescribing provider visit in past 12 months       Last office visit with prescriber/PCP: 5/9/2019 Carlos Noel MD OR same dept: 5/9/2019 Carlos Noel MD OR same specialty: 5/9/2019 Carlos Noel MD  Last physical: 9/21/2017 Last MTM visit: Visit date not found   Next visit within 3 mo: Visit date not found  Next physical within 3 mo: Visit date not found  Prescriber OR PCP: Carlos Noel MD  Last diagnosis associated with med order: 1. Type 2 diabetes mellitus without complication, without long-term current use of insulin (H)  - glipiZIDE (GLUCOTROL XL) 10 MG 24 hr tablet [Pharmacy Med Name: GLIPIZIDE XL 10 MG TABLET 10 TAB]; TAKE 1 TABLET (10 MG TOTAL) BY MOUTH 2 (TWO) TIMES A DAY WITH MEALS FOR DIABETES  Dispense: 60 tablet; Refill: 3  - lisinopril (PRINIVIL,ZESTRIL) 2.5 MG tablet [Pharmacy Med Name: LISINOPRIL 2.5 MG TABS 2.5 TAB]; TAKE 1 TABLET (2.5 MG TOTAL) BY MOUTH DAILY FOR BLOOD PRESSURE  Dispense: 90 tablet; Refill: 1    If protocol passes may refill for 12 months if within 3 months of last provider visit (or a total of 15 months).             Passed - Serum Potassium in past 12 months     Lab Results   Component Value Date    Potassium 3.9 04/12/2019             Passed - Blood pressure filed in past 12 months     BP Readings from Last 1 Encounters:   08/27/19 102/72             Passed - Serum Creatinine in past 12 months     Creatinine   Date Value Ref Range Status   04/12/2019 0.75 0.60 - 1.10 mg/dL Final

## 2023-01-18 ENCOUNTER — TELEPHONE (OUTPATIENT)
Dept: FAMILY MEDICINE | Facility: CLINIC | Age: 43
End: 2023-01-18
Payer: COMMERCIAL

## 2023-01-18 NOTE — TELEPHONE ENCOUNTER
1/18/2023 called in conf with  line, no answer. Left message to inform of 1/19 appt with T[facility] cancellation, and to call central scheduling to make an appt with an appropriate provider.

## 2023-02-06 ENCOUNTER — OFFICE VISIT (OUTPATIENT)
Dept: FAMILY MEDICINE | Facility: CLINIC | Age: 43
End: 2023-02-06
Payer: COMMERCIAL

## 2023-02-06 VITALS
DIASTOLIC BLOOD PRESSURE: 62 MMHG | HEIGHT: 59 IN | BODY MASS INDEX: 25.3 KG/M2 | SYSTOLIC BLOOD PRESSURE: 96 MMHG | WEIGHT: 125.5 LBS | RESPIRATION RATE: 16 BRPM | HEART RATE: 94 BPM | OXYGEN SATURATION: 98 % | TEMPERATURE: 98 F

## 2023-02-06 DIAGNOSIS — N76.0 BV (BACTERIAL VAGINOSIS): Primary | ICD-10-CM

## 2023-02-06 DIAGNOSIS — B96.89 BV (BACTERIAL VAGINOSIS): Primary | ICD-10-CM

## 2023-02-06 DIAGNOSIS — N89.8 VAGINAL ITCHING: ICD-10-CM

## 2023-02-06 LAB
CLUE CELLS: PRESENT
TRICHOMONAS, WET PREP: ABNORMAL
WBC'S/HIGH POWER FIELD, WET PREP: ABNORMAL
YEAST, WET PREP: ABNORMAL

## 2023-02-06 PROCEDURE — 87210 SMEAR WET MOUNT SALINE/INK: CPT | Performed by: FAMILY MEDICINE

## 2023-02-06 PROCEDURE — 99213 OFFICE O/P EST LOW 20 MIN: CPT | Performed by: FAMILY MEDICINE

## 2023-02-06 RX ORDER — METRONIDAZOLE 500 MG/1
500 TABLET ORAL 2 TIMES DAILY
Qty: 14 TABLET | Refills: 0 | Status: SHIPPED | OUTPATIENT
Start: 2023-02-06 | End: 2023-02-13

## 2023-02-06 NOTE — PROGRESS NOTES
"  Assessment & Plan     BV (bacterial vaginosis)  Wet prep pos for clue cells, neg for yeast. Will treat as below and recommend she follow up if worsening or not improving. Advised to not drink while taking metronidazole.   - metroNIDAZOLE (FLAGYL) 500 MG tablet  Dispense: 14 tablet; Refill: 0                   No follow-ups on file.    Barby Casas MD  [facility] SARABJIT Ibarra is a 42 year old, presenting for the following health issues:  Vaginal Problem (Vaginal itchiness x2-3 weeks )    Vaginal itching x 3 weeks. No vaginal discharge. 2 years ago had same symptoms and tested positive for infection (review of chart shows pos wet prep for yeast 5 years ago, unclear if this is the episode she is referring to)    Denies pregnancy     Eye redness yesterday, now gone    DM2 - treated w metformin and januvia, reports compliance. Hyperlipidemia treated w statin, reports compliance  -200 nonfasting, fasting 150 at most  Last A1c in Nov excellent 6.9%    Appt 5/5 with PCP for DM2        Review of Systems         Objective    BP 96/62   Pulse 94   Temp 98  F (36.7  C) (Oral)   Resp 16   Ht 1.486 m (4' 10.5\")   Wt 56.9 kg (125 lb 8 oz)   LMP  (LMP Unknown)   SpO2 98%   BMI 25.78 kg/m    Body mass index is 25.78 kg/m .  Physical Exam   Gen: NAD, well appearing  Genitalia: normal external genitalia, no lesions, no erythema                      "

## 2023-05-16 ENCOUNTER — TELEPHONE (OUTPATIENT)
Dept: FAMILY MEDICINE | Facility: CLINIC | Age: 43
End: 2023-05-16
Payer: COMMERCIAL

## 2023-05-16 NOTE — TELEPHONE ENCOUNTER
Patient Quality Outreach    Patient is due for the following:   Diabetes -  A1C, Eye Exam, Diabetic Follow-Up Visit and Foot Exam  Cervical Cancer Screening - PAP Needed  Physical Preventive Adult Physical    Next Steps:   Schedule a Adult Preventative    Type of outreach:    Phone, spoke to patient/parent. Patient agree to schedule appointment with female provider.    Next Steps:  Reach out within 90 days via Phone.    Max number of attempts reached: Yes. Will try again in 90 days if patient still on fail list.    Questions for provider review:    None           Dianne Jones  Chart routed to Provider.

## 2023-05-26 RX ORDER — TIMOLOL MALEATE 5 MG/ML
SOLUTION/ DROPS OPHTHALMIC
Qty: 84 TABLET | Refills: 3 | Status: SHIPPED | OUTPATIENT
Start: 2023-05-26 | End: 2023-08-03

## 2023-05-27 NOTE — TELEPHONE ENCOUNTER
"Last Written Prescription Date:  3/17/22  Last Fill Quantity: 84,  # refills: 3   Last office visit provider:  2/6/23     Requested Prescriptions   Pending Prescriptions Disp Refills     VIENVA 0.1-20 MG-MCG tablet [Pharmacy Med Name: VIENVA 0.1-20 MG-MCG TABS 0.1-20 Tablet] 84 tablet 3     Sig: TAKE 1 TABLET BY MOUTH DAILY       Contraceptives Protocol Passed - 5/26/2023  8:49 AM        Passed - Patient is not a current smoker if age is 35 or older        Passed - Recent (12 mo) or future (30 days) visit within the authorizing provider's specialty     Patient has had an office visit with the authorizing provider or a provider within the authorizing providers department within the previous 12 mos or has a future within next 30 days. See \"Patient Info\" tab in inbasket, or \"Choose Columns\" in Meds & Orders section of the refill encounter.              Passed - Medication is active on med list        Passed - No active pregnancy on record        Passed - No positive pregnancy test in past 12 months             Quan Sanchez RN 05/26/23 11:22 PM  "

## 2023-05-29 DIAGNOSIS — M77.12 LATERAL EPICONDYLITIS OF LEFT ELBOW: ICD-10-CM

## 2023-05-29 NOTE — TELEPHONE ENCOUNTER
"Routing refill request to provider for review/approval because:  Labs not current:  ALT, AST, CBC, cr    Last Written Prescription Date:  6/17/22  Last Fill Quantity: 30,  # refills: 11   Last office visit provider: 1/5/23      Requested Prescriptions   Pending Prescriptions Disp Refills     meloxicam (MOBIC) 15 MG tablet [Pharmacy Med Name: MELOXICAM 15 MG TABLET 15 Tablet] 30 tablet 11     Sig: TAKE 1 TABLET (15 MG) BY MOUTH DAILY       NSAID Medications Failed - 5/29/2023  9:04 AM        Failed - Normal ALT on file in past 12 months     Recent Labs   Lab Test 02/15/22  1707   ALT 30             Failed - Normal AST on file in past 12 months     Recent Labs   Lab Test 02/15/22  1707   AST 19             Failed - Normal CBC on file in past 12 months     Recent Labs   Lab Test 07/10/20  1637 05/22/20  1340   WBC  --  10.3   RBC  --  3.86   HGB 12.0 12.3   HCT  --  35.9   PLT  --  224                 Failed - Normal serum creatinine on file in past 12 months     Recent Labs   Lab Test 02/15/22  1707   CR 0.80       Ok to refill medication if creatinine is low          Passed - Blood pressure under 140/90 in past 12 months     BP Readings from Last 3 Encounters:   02/06/23 96/62   01/05/23 118/70   11/03/22 110/80                 Passed - Recent (12 mo) or future (30 days) visit within the authorizing provider's specialty     Patient has had an office visit with the authorizing provider or a provider within the authorizing providers department within the previous 12 mos or has a future within next 30 days. See \"Patient Info\" tab in inbasket, or \"Choose Columns\" in Meds & Orders section of the refill encounter.              Passed - Patient is age 6-64 years        Passed - Medication is active on med list        Passed - No active pregnancy on record        Passed - No positive pregnancy test in past 12 months             Ashlie Pendleton RN 05/29/23 1:34 PM  "

## 2023-05-30 RX ORDER — MELOXICAM 15 MG/1
15 TABLET ORAL DAILY
Qty: 30 TABLET | Refills: 11 | Status: SHIPPED | OUTPATIENT
Start: 2023-05-30

## 2023-07-02 DIAGNOSIS — K29.00 ACUTE SUPERFICIAL GASTRITIS WITHOUT HEMORRHAGE: ICD-10-CM

## 2023-07-02 RX ORDER — FAMOTIDINE 40 MG/1
40 TABLET, FILM COATED ORAL DAILY
Qty: 30 TABLET | Refills: 7 | Status: SHIPPED | OUTPATIENT
Start: 2023-07-02 | End: 2024-02-27

## 2023-07-02 NOTE — TELEPHONE ENCOUNTER
"Last Written Prescription Date:  6/17/2022  Last Fill Quantity: 30,  # refills: 11   Last office visit provider:  2/6/2023     Requested Prescriptions   Pending Prescriptions Disp Refills     famotidine (PEPCID) 40 MG tablet [Pharmacy Med Name: FAMOTIDINE 40 MG TABS 40 Tablet] 30 tablet 11     Sig: TAKE 1 TABLET (40 MG) BY MOUTH DAILY       H2 Blockers Protocol Passed - 7/2/2023 10:08 AM        Passed - Patient is age 12 or older        Passed - Recent (12 mo) or future (30 days) visit within the authorizing provider's specialty     Patient has had an office visit with the authorizing provider or a provider within the authorizing providers department within the previous 12 mos or has a future within next 30 days. See \"Patient Info\" tab in inbasket, or \"Choose Columns\" in Meds & Orders section of the refill encounter.              Passed - Medication is active on med list             Wendy Donovan RN 07/02/23 2:35 PM  "

## 2023-07-22 ENCOUNTER — TRANSFERRED RECORDS (OUTPATIENT)
Dept: HEALTH INFORMATION MANAGEMENT | Facility: CLINIC | Age: 43
End: 2023-07-22

## 2023-07-22 LAB — RETINOPATHY: NEGATIVE

## 2023-08-03 ENCOUNTER — OFFICE VISIT (OUTPATIENT)
Dept: FAMILY MEDICINE | Facility: CLINIC | Age: 43
End: 2023-08-03
Payer: COMMERCIAL

## 2023-08-03 VITALS
TEMPERATURE: 98.2 F | HEART RATE: 96 BPM | BODY MASS INDEX: 26.87 KG/M2 | SYSTOLIC BLOOD PRESSURE: 104 MMHG | RESPIRATION RATE: 16 BRPM | WEIGHT: 128 LBS | OXYGEN SATURATION: 97 % | HEIGHT: 58 IN | DIASTOLIC BLOOD PRESSURE: 74 MMHG

## 2023-08-03 DIAGNOSIS — Z12.4 SCREENING FOR CERVICAL CANCER: ICD-10-CM

## 2023-08-03 DIAGNOSIS — K21.00 GASTROESOPHAGEAL REFLUX DISEASE WITH ESOPHAGITIS WITHOUT HEMORRHAGE: ICD-10-CM

## 2023-08-03 DIAGNOSIS — Z11.3 SCREEN FOR STD (SEXUALLY TRANSMITTED DISEASE): ICD-10-CM

## 2023-08-03 DIAGNOSIS — Z00.00 ROUTINE GENERAL MEDICAL EXAMINATION AT A HEALTH CARE FACILITY: Primary | ICD-10-CM

## 2023-08-03 DIAGNOSIS — E11.69 TYPE 2 DIABETES MELLITUS WITH OTHER SPECIFIED COMPLICATION, WITHOUT LONG-TERM CURRENT USE OF INSULIN (H): ICD-10-CM

## 2023-08-03 DIAGNOSIS — Z30.41 ENCOUNTER FOR SURVEILLANCE OF CONTRACEPTIVE PILLS: ICD-10-CM

## 2023-08-03 LAB
ALBUMIN SERPL BCG-MCNC: 4.3 G/DL (ref 3.5–5.2)
ALP SERPL-CCNC: 66 U/L (ref 35–104)
ALT SERPL W P-5'-P-CCNC: 38 U/L (ref 0–50)
ANION GAP SERPL CALCULATED.3IONS-SCNC: 14 MMOL/L (ref 7–15)
AST SERPL W P-5'-P-CCNC: 37 U/L (ref 0–45)
BILIRUB SERPL-MCNC: 0.5 MG/DL
BUN SERPL-MCNC: 8.9 MG/DL (ref 6–20)
CALCIUM SERPL-MCNC: 9.5 MG/DL (ref 8.6–10)
CHLORIDE SERPL-SCNC: 99 MMOL/L (ref 98–107)
CLUE CELLS: ABNORMAL
CREAT SERPL-MCNC: 0.62 MG/DL (ref 0.51–0.95)
DEPRECATED HCO3 PLAS-SCNC: 23 MMOL/L (ref 22–29)
GFR SERPL CREATININE-BSD FRML MDRD: >90 ML/MIN/1.73M2
GLUCOSE SERPL-MCNC: 301 MG/DL (ref 70–99)
HBA1C MFR BLD: 7.8 % (ref 0–5.6)
POTASSIUM SERPL-SCNC: 3.6 MMOL/L (ref 3.4–5.3)
PROT SERPL-MCNC: 7.4 G/DL (ref 6.4–8.3)
SODIUM SERPL-SCNC: 136 MMOL/L (ref 136–145)
TRICHOMONAS, WET PREP: ABNORMAL
WBC'S/HIGH POWER FIELD, WET PREP: ABNORMAL
YEAST, WET PREP: ABNORMAL

## 2023-08-03 PROCEDURE — 83036 HEMOGLOBIN GLYCOSYLATED A1C: CPT | Performed by: FAMILY MEDICINE

## 2023-08-03 PROCEDURE — 86706 HEP B SURFACE ANTIBODY: CPT | Performed by: FAMILY MEDICINE

## 2023-08-03 PROCEDURE — 87210 SMEAR WET MOUNT SALINE/INK: CPT | Performed by: FAMILY MEDICINE

## 2023-08-03 PROCEDURE — 87624 HPV HI-RISK TYP POOLED RSLT: CPT | Performed by: FAMILY MEDICINE

## 2023-08-03 PROCEDURE — 36415 COLL VENOUS BLD VENIPUNCTURE: CPT | Performed by: FAMILY MEDICINE

## 2023-08-03 PROCEDURE — G0123 SCREEN CERV/VAG THIN LAYER: HCPCS | Performed by: FAMILY MEDICINE

## 2023-08-03 PROCEDURE — 80053 COMPREHEN METABOLIC PANEL: CPT | Performed by: FAMILY MEDICINE

## 2023-08-03 PROCEDURE — 87491 CHLMYD TRACH DNA AMP PROBE: CPT | Performed by: FAMILY MEDICINE

## 2023-08-03 PROCEDURE — 87591 N.GONORRHOEAE DNA AMP PROB: CPT | Performed by: FAMILY MEDICINE

## 2023-08-03 PROCEDURE — 99396 PREV VISIT EST AGE 40-64: CPT | Performed by: FAMILY MEDICINE

## 2023-08-03 PROCEDURE — 99214 OFFICE O/P EST MOD 30 MIN: CPT | Mod: 25 | Performed by: FAMILY MEDICINE

## 2023-08-03 PROCEDURE — 99207 PR FOOT EXAM NO CHARGE: CPT | Mod: 25 | Performed by: FAMILY MEDICINE

## 2023-08-03 RX ORDER — CALCIUM CARBONATE 500 MG/1
1 TABLET, CHEWABLE ORAL 3 TIMES DAILY
Qty: 90 TABLET | Refills: 4 | Status: SHIPPED | OUTPATIENT
Start: 2023-08-03 | End: 2023-12-27

## 2023-08-03 RX ORDER — LEVONORGESTREL/ETHIN.ESTRADIOL 0.1-0.02MG
1 TABLET ORAL DAILY
Qty: 84 TABLET | Refills: 3 | Status: SHIPPED | OUTPATIENT
Start: 2023-08-03 | End: 2024-04-08

## 2023-08-03 ASSESSMENT — ENCOUNTER SYMPTOMS
CHILLS: 0
DIZZINESS: 0
HEMATURIA: 0
FREQUENCY: 0
MYALGIAS: 0
HEMATOCHEZIA: 0
HEARTBURN: 0
NERVOUS/ANXIOUS: 0
FEVER: 0
WEAKNESS: 0
JOINT SWELLING: 0
SHORTNESS OF BREATH: 0
SORE THROAT: 0
NAUSEA: 0
DIARRHEA: 0
ABDOMINAL PAIN: 0
EYE PAIN: 0
HEADACHES: 0
CONSTIPATION: 0
PARESTHESIAS: 0
COUGH: 0
ARTHRALGIAS: 0
DYSURIA: 0
PALPITATIONS: 0
BREAST MASS: 0

## 2023-08-03 NOTE — Clinical Note
Checked her DM with her physical (all her quality done for you). A1c back up to 7.8%, I restarted januvia but only at 25mg, scheduled to see you in 3 months.

## 2023-08-03 NOTE — Clinical Note
Please call patient and let her know that her A1c for diabetes is up to 7.8%. we sent in the medication she used to be on (januvia 25mg) and she should start taking that once per day in the mornings. Follow up already scheduled with Dr. Noel in 3 months, but if she would like to see someone sooner we can schedule with pharmacist before that.   Thanks!  Wendi Morales MD

## 2023-08-03 NOTE — PROGRESS NOTES
SUBJECTIVE:   CC: Stacey is an 43 year old who presents for preventive health visit.       Healthy Habits:     Getting at least 3 servings of Calcium per day:  Yes    Bi-annual eye exam:  Yes    Dental care twice a year:  NO    Sleep apnea or symptoms of sleep apnea:  None    Diet:  Regular (no restrictions)    Frequency of exercise:  None    Taking medications regularly:  Yes    Medication side effects:  None    Additional concerns today:  No      Today's PHQ-2 Score:       8/3/2023     5:10 PM   PHQ-2 ( 1999 [employer])   Q1: Little interest or pleasure in doing things 0   Q2: Feeling down, depressed or hopeless 0   PHQ-2 Score 0   Q1: Little interest or pleasure in doing things Not at all   Q2: Feeling down, depressed or hopeless Not at all   PHQ-2 Score 0         Have you ever done Advance Care Planning? (For example, a Health Directive, POLST, or a discussion with a medical provider or your loved ones about your wishes): No, advance care planning information given to patient to review.  Patient plans to discuss their wishes with loved ones or provider.      Social History     Tobacco Use     Smoking status: Never     Passive exposure: Never     Smokeless tobacco: Current     Types: Chew     Tobacco comments:     chews betel nut   Substance Use Topics     Alcohol use: Not on file             8/3/2023     5:09 PM   Alcohol Use   Prescreen: >3 drinks/day or >7 drinks/week? Not Applicable     Reviewed orders with patient.  Reviewed health maintenance and updated orders accordingly - Yes  Lab work is in process  Labs reviewed in EPIC    Breast Cancer Screening:    FHS-7:        No data to display                  Pertinent mammograms are reviewed under the imaging tab.    History of abnormal Pap smear: NO - age 30-65 PAP every 5 years with negative HPV co-testing recommended      10/19/2017     8:38 AM   PAP / HPV   PAP Negative for squamous intraepithelial lesion or malignancy  Electronically signed by Zhou  "BERRY Casarez ([facility]) on 10/26/2017 at  3:43 PM        Reviewed and updated as needed this visit by clinical staff   Tobacco  Allergies  Meds  Problems  Med Hx  Surg Hx  Fam Hx          Reviewed and updated as needed this visit by Provider   Tobacco  Allergies  Meds  Problems  Med Hx  Surg Hx  Fam Hx             Review of Systems   Constitutional:  Negative for chills and fever.   HENT:  Negative for congestion, ear pain, hearing loss and sore throat.    Eyes:  Negative for pain and visual disturbance.   Respiratory:  Negative for cough and shortness of breath.    Cardiovascular:  Negative for chest pain, palpitations and peripheral edema.   Gastrointestinal:  Negative for abdominal pain, constipation, diarrhea, heartburn, hematochezia and nausea.   Breasts:  Negative for tenderness, breast mass and discharge.   Genitourinary:  Negative for dysuria, frequency, genital sores, hematuria, pelvic pain, urgency, vaginal bleeding and vaginal discharge.   Musculoskeletal:  Negative for arthralgias, joint swelling and myalgias.   Skin:  Negative for rash.   Neurological:  Negative for dizziness, weakness, headaches and paresthesias.   Psychiatric/Behavioral:  Negative for mood changes. The patient is not nervous/anxious.           OBJECTIVE:   /74 (BP Location: Left arm, Patient Position: Sitting, Cuff Size: Adult Regular)   Pulse 96   Temp 98.2  F (36.8  C) (Oral)   Resp 16   Ht 1.47 m (4' 9.87\")   Wt 58.1 kg (128 lb)   LMP  (LMP Unknown)   SpO2 97%   BMI 26.87 kg/m    Physical Exam  GENERAL: healthy, alert and no distress  EYES: Eyes grossly normal to inspection, PERRL and conjunctivae and sclerae normal  HENT: ear canals and TM's normal, nose and mouth without ulcers or lesions  NECK: no adenopathy, no asymmetry, masses, or scars and thyroid normal to palpation  RESP: lungs clear to auscultation - no rales, rhonchi or wheezes  BREAST: normal without masses, tenderness or nipple discharge and no " palpable axillary masses or adenopathy  CV: regular rate and rhythm, normal S1 S2, no S3 or S4, no murmur, click or rub, no peripheral edema and peripheral pulses strong  ABDOMEN: soft, nontender, no hepatosplenomegaly, no masses and bowel sounds normal   (female): External genitalia is without lesions. Introitus is normal, vaginal walls pink and moist without lesions or evidence of trauma. No cervical motion tenderness. No adnexal masses. No cervical lesions or discharge  MS: no gross musculoskeletal defects noted, no edema  SKIN: no suspicious lesions or rashes  NEURO: Normal strength and tone, mentation intact and speech normal  PSYCH: mentation appears normal, affect normal/bright    Diagnostic Test Results:  Labs reviewed in Epic  Results for orders placed or performed in visit on 08/03/23 (from the past 24 hour(s))   Wet prep - Clinic Collect    Specimen: Vagina; Swab   Result Value Ref Range    Trichomonas Absent Absent    Yeast Absent Absent    Clue Cells Absent Absent    WBCs/high power field 3+ (A) None   Hemoglobin A1c   Result Value Ref Range    Hemoglobin A1C 7.8 (H) 0.0 - 5.6 %   Comprehensive metabolic panel (BMP + Alb, Alk Phos, ALT, AST, Total. Bili, TP)   Result Value Ref Range    Sodium 136 136 - 145 mmol/L    Potassium 3.6 3.4 - 5.3 mmol/L    Chloride 99 98 - 107 mmol/L    Carbon Dioxide (CO2) 23 22 - 29 mmol/L    Anion Gap 14 7 - 15 mmol/L    Urea Nitrogen 8.9 6.0 - 20.0 mg/dL    Creatinine 0.62 0.51 - 0.95 mg/dL    Calcium 9.5 8.6 - 10.0 mg/dL    Glucose 301 (H) 70 - 99 mg/dL    Alkaline Phosphatase 66 35 - 104 U/L    AST 37 0 - 45 U/L    ALT 38 0 - 50 U/L    Protein Total 7.4 6.4 - 8.3 g/dL    Albumin 4.3 3.5 - 5.2 g/dL    Bilirubin Total 0.5 <=1.2 mg/dL    GFR Estimate >90 >60 mL/min/1.73m2       ASSESSMENT/PLAN:   Stacey was seen today for physical.    Diagnoses and all orders for this visit:    Routine general medical examination at a health care facility  -     Hepatitis B Surface Antibody;  "Future  -     Hepatitis B Surface Antibody    Screening for cervical cancer  -     Pap screen with HPV - recommended age 30 - 65 years    Type 2 diabetes mellitus with other specified complication, without long-term current use of insulin (H)  Patient has not been seen for her DM in over 9 months, today a1c significantly increased to 7.8%, above goal 7%. Will restart januvia at 25mg, continue metformin, see PCP in 3 months.  -     Hemoglobin A1c; Future  -     Comprehensive metabolic panel (BMP + Alb, Alk Phos, ALT, AST, Total. Bili, TP); Future  -     FOOT EXAM  -     Adult Eye  Referral; Future  -     Hemoglobin A1c  -     Comprehensive metabolic panel (BMP + Alb, Alk Phos, ALT, AST, Total. Bili, TP)    Gastroesophageal reflux disease with esophagitis without hemorrhage  -     calcium carbonate (TUMS) 500 MG chewable tablet; Take 1 tablet (500 mg) by mouth 3 times daily    Screen for STD (sexually transmitted disease)  -     Wet prep - Clinic Collect  -     Chlamydia trachomatis/Neisseria gonorrhoeae by PCR - Clinic Collect    Encounter for surveillance of contraceptive pills  -     levonorgestrel-ethinyl estradiol (VIENVA) 0.1-20 MG-MCG tablet; Take 1 tablet by mouth daily    Other orders  -     REVIEW OF HEALTH MAINTENANCE PROTOCOL ORDERS  -     PRIMARY CARE FOLLOW-UP SCHEDULING; Future              COUNSELING:  Reviewed preventive health counseling, as reflected in patient instructions      BMI:   Estimated body mass index is 26.87 kg/m  as calculated from the following:    Height as of this encounter: 1.47 m (4' 9.87\").    Weight as of this encounter: 58.1 kg (128 lb).   Weight management plan: Discussed healthy diet and exercise guidelines      She reports that she has never smoked. She has never been exposed to tobacco smoke. Her smokeless tobacco use includes chew.      Wendi Morales MD  [facility]  "

## 2023-08-04 ENCOUNTER — TELEPHONE (OUTPATIENT)
Dept: FAMILY MEDICINE | Facility: CLINIC | Age: 43
End: 2023-08-04
Payer: COMMERCIAL

## 2023-08-04 LAB
C TRACH DNA SPEC QL PROBE+SIG AMP: NEGATIVE
HBV SURFACE AB SERPL IA-ACNC: 241.37 M[IU]/ML
HBV SURFACE AB SERPL IA-ACNC: REACTIVE M[IU]/ML
N GONORRHOEA DNA SPEC QL NAA+PROBE: NEGATIVE

## 2023-08-04 NOTE — TELEPHONE ENCOUNTER
Called pt and relayed Dr. Morales's message. Pt verbalized understanding.    Serjio Guerrero Cem Say, BSN RN  [facility]        Wendi Morales MD  [facility]  Please call patient and let her know that her A1c for diabetes is up to 7.8%. we sent in the medication she used to be on (januvia 25mg) and she should start taking that once per day in the mornings. Follow up already scheduled with Dr. Noel in 3 months, but if she would like to see someone sooner we can schedule with pharmacist before that.    Thanks!    Wendi Morales MD

## 2023-08-09 LAB
BKR LAB AP GYN ADEQUACY: NORMAL
BKR LAB AP GYN INTERPRETATION: NORMAL
BKR LAB AP HPV REFLEX: NORMAL
BKR LAB AP PREVIOUS ABNORMAL: NORMAL
PATH REPORT.COMMENTS IMP SPEC: NORMAL
PATH REPORT.COMMENTS IMP SPEC: NORMAL
PATH REPORT.RELEVANT HX SPEC: NORMAL

## 2023-08-10 LAB
HUMAN PAPILLOMA VIRUS 16 DNA: NEGATIVE
HUMAN PAPILLOMA VIRUS 18 DNA: NEGATIVE
HUMAN PAPILLOMA VIRUS FINAL DIAGNOSIS: NORMAL
HUMAN PAPILLOMA VIRUS OTHER HR: NEGATIVE

## 2023-10-29 DIAGNOSIS — E11.69 TYPE 2 DIABETES MELLITUS WITH OTHER SPECIFIED COMPLICATION, WITHOUT LONG-TERM CURRENT USE OF INSULIN (H): ICD-10-CM

## 2023-10-30 RX ORDER — SITAGLIPTIN 25 MG/1
TABLET, FILM COATED ORAL
Qty: 30 TABLET | Refills: 0 | OUTPATIENT
Start: 2023-10-30

## 2023-11-03 ENCOUNTER — OFFICE VISIT (OUTPATIENT)
Dept: FAMILY MEDICINE | Facility: CLINIC | Age: 43
End: 2023-11-03
Attending: FAMILY MEDICINE
Payer: COMMERCIAL

## 2023-11-03 VITALS
HEART RATE: 73 BPM | TEMPERATURE: 98.1 F | SYSTOLIC BLOOD PRESSURE: 101 MMHG | HEIGHT: 58 IN | DIASTOLIC BLOOD PRESSURE: 65 MMHG | RESPIRATION RATE: 20 BRPM | BODY MASS INDEX: 27.38 KG/M2 | WEIGHT: 130.44 LBS | OXYGEN SATURATION: 97 %

## 2023-11-03 DIAGNOSIS — E78.1 PURE HYPERTRIGLYCERIDEMIA: ICD-10-CM

## 2023-11-03 DIAGNOSIS — E08.00 DIABETES MELLITUS DUE TO UNDERLYING CONDITION WITH HYPEROSMOLARITY WITHOUT COMA, UNSPECIFIED WHETHER LONG TERM INSULIN USE (H): Primary | ICD-10-CM

## 2023-11-03 DIAGNOSIS — E78.5 HYPERLIPIDEMIA LDL GOAL <100: ICD-10-CM

## 2023-11-03 DIAGNOSIS — K21.00 GASTROESOPHAGEAL REFLUX DISEASE WITH ESOPHAGITIS WITHOUT HEMORRHAGE: ICD-10-CM

## 2023-11-03 DIAGNOSIS — N89.8 VAGINAL ITCHING: ICD-10-CM

## 2023-11-03 DIAGNOSIS — Z23 ENCOUNTER FOR IMMUNIZATION: ICD-10-CM

## 2023-11-03 LAB
ALBUMIN UR-MCNC: NEGATIVE MG/DL
APPEARANCE UR: ABNORMAL
BACTERIA #/AREA URNS HPF: ABNORMAL /HPF
BILIRUB UR QL STRIP: NEGATIVE
COLOR UR AUTO: YELLOW
CREAT UR-MCNC: 38.9 MG/DL
GLUCOSE UR STRIP-MCNC: 500 MG/DL
HBA1C MFR BLD: 7.7 % (ref 0–5.6)
HGB UR QL STRIP: ABNORMAL
KETONES UR STRIP-MCNC: NEGATIVE MG/DL
LEUKOCYTE ESTERASE UR QL STRIP: ABNORMAL
MICROALBUMIN UR-MCNC: 21.3 MG/L
MICROALBUMIN/CREAT UR: 54.76 MG/G CR (ref 0–25)
MUCOUS THREADS #/AREA URNS LPF: PRESENT /LPF
NITRATE UR QL: NEGATIVE
PH UR STRIP: 5.5 [PH] (ref 5–7)
RBC #/AREA URNS AUTO: ABNORMAL /HPF
SP GR UR STRIP: 1.01 (ref 1–1.03)
SQUAMOUS #/AREA URNS AUTO: ABNORMAL /LPF
UROBILINOGEN UR STRIP-ACNC: 0.2 E.U./DL
WBC #/AREA URNS AUTO: ABNORMAL /HPF
WBC CLUMPS #/AREA URNS HPF: PRESENT /HPF

## 2023-11-03 PROCEDURE — 82570 ASSAY OF URINE CREATININE: CPT | Performed by: FAMILY MEDICINE

## 2023-11-03 PROCEDURE — 90472 IMMUNIZATION ADMIN EACH ADD: CPT | Performed by: FAMILY MEDICINE

## 2023-11-03 PROCEDURE — 90480 ADMN SARSCOV2 VAC 1/ONLY CMP: CPT | Performed by: FAMILY MEDICINE

## 2023-11-03 PROCEDURE — 87086 URINE CULTURE/COLONY COUNT: CPT | Performed by: FAMILY MEDICINE

## 2023-11-03 PROCEDURE — 90471 IMMUNIZATION ADMIN: CPT | Performed by: FAMILY MEDICINE

## 2023-11-03 PROCEDURE — 99214 OFFICE O/P EST MOD 30 MIN: CPT | Mod: 25 | Performed by: FAMILY MEDICINE

## 2023-11-03 PROCEDURE — 36415 COLL VENOUS BLD VENIPUNCTURE: CPT | Performed by: FAMILY MEDICINE

## 2023-11-03 PROCEDURE — 90686 IIV4 VACC NO PRSV 0.5 ML IM: CPT | Performed by: FAMILY MEDICINE

## 2023-11-03 PROCEDURE — 87186 SC STD MICRODIL/AGAR DIL: CPT | Performed by: FAMILY MEDICINE

## 2023-11-03 PROCEDURE — 80061 LIPID PANEL: CPT | Performed by: FAMILY MEDICINE

## 2023-11-03 PROCEDURE — 90746 HEPB VACCINE 3 DOSE ADULT IM: CPT | Performed by: FAMILY MEDICINE

## 2023-11-03 PROCEDURE — 91320 SARSCV2 VAC 30MCG TRS-SUC IM: CPT | Performed by: FAMILY MEDICINE

## 2023-11-03 PROCEDURE — 82043 UR ALBUMIN QUANTITATIVE: CPT | Performed by: FAMILY MEDICINE

## 2023-11-03 PROCEDURE — 83036 HEMOGLOBIN GLYCOSYLATED A1C: CPT | Performed by: FAMILY MEDICINE

## 2023-11-03 PROCEDURE — 81001 URINALYSIS AUTO W/SCOPE: CPT | Performed by: FAMILY MEDICINE

## 2023-11-03 NOTE — PROGRESS NOTES
Assessment & Plan     Diabetes mellitus due to underlying condition with hyperosmolarity without coma, unspecified whether long term insulin use (H)  Last A1c was 7.9 blood sugars have been in the 150s and 60s she forgot her glucometer today today's A1c  - Albumin Random Urine Quantitative with Creat Ratio; Future  - Hemoglobin A1c; Future    Gastroesophageal reflux disease with esophagitis without hemorrhage    No reflux symptoms no abdominal pain abdominal burning    Pure hypertriglyceridemia    Checking a lipid panel  - Lipid panel reflex to direct LDL Non-fasting; Future    Vaginal itching    Vaginal itching and irritation check urinalysis today she refused a wet prep she has had the symptoms since August    Annual physical wet prep was inconclusive  - UA with Microscopic reflex to Culture - Clinic Collect    Hyperlipidemia LDL goal <100        Encounter for immunization    She agrees for immunizations today potential side effects discussed she received the hepatitis B, COVID-19, influenza vaccine today.                 Carlos Noel MD  [facility] SARABJIT Ibarra is a 43 year old, presenting for the following health issues:  Diabetes, Vaginal Problem (C/o vaginal itching comes and goes), and Refill Request (Refill for test strips)      11/3/2023    12:54 PM   Additional Questions   Roomed by Marcela PANDEY       Vaginal Problem     History of Present Illness       Diabetes:   She presents for follow up of diabetes.  She is checking home blood glucose a few times a month.   She checks blood glucose before meals.  Blood glucose is sometimes over 200 and never under 70. She is aware of hypoglycemia symptoms including weakness.    She has no concerns regarding her diabetes at this time.   She is not experiencing numbness or burning in feet, excessive thirst, blurry vision, weight changes or redness, sores or blisters on feet.                   44-year-old female here for follow-up she is Dr. Morales  "for annual physical where she had a Pap smear and a wet prep.  She says today she thinks her blood sugars may be higher because she has been experiencing some headache she says has been taking all her medications she has not changed her activity level and she has not changed her diet.  She has access to all her medications and has not been missing doses.  She states that she still has vaginal itching no bleeding has been noted.  Denies any abdominal pain.  She has been taking medications for GERD which have been working well.      Review of Systems   Genitourinary:  Positive for vaginal discharge.   Other seven-point review of systems negative  Constitutional, HEENT, cardiovascular, pulmonary, gi and gu systems are negative, except as otherwise noted.      Objective    Pulse 73   Temp 98.1  F (36.7  C) (Oral)   Resp 20   Ht 1.47 m (4' 9.87\")   Wt 59.2 kg (130 lb 7 oz)   LMP 10/10/2023   SpO2 97%   BMI 27.38 kg/m    Body mass index is 27.38 kg/m .  Physical Exam   GENERAL: healthy, alert and no distress  EYES: Eyes grossly normal to inspection, PERRL and conjunctivae and sclerae normal  NECK: no adenopathy, no asymmetry, masses, or scars and thyroid normal to palpation  RESP: lungs clear to auscultation - no rales, rhonchi or wheezes  CV: regular rate and rhythm, normal S1 S2, no S3 or S4, no murmur, click or rub, no peripheral edema and peripheral pulses strong  ABDOMEN: soft, nontender, no hepatosplenomegaly, no masses and bowel sounds normal  MS: no gross musculoskeletal defects noted, no edema  SKIN: no suspicious lesions or rashes  NEURO: Normal strength and tone, mentation intact and speech normal  PSYCH: mentation appears normal, affect normal/bright            "

## 2023-11-04 LAB
CHOLEST SERPL-MCNC: 117 MG/DL
HDLC SERPL-MCNC: 42 MG/DL
LDLC SERPL CALC-MCNC: 35 MG/DL
NONHDLC SERPL-MCNC: 75 MG/DL
TRIGL SERPL-MCNC: 200 MG/DL

## 2023-11-05 LAB — BACTERIA UR CULT: ABNORMAL

## 2023-12-26 DIAGNOSIS — K21.00 GASTROESOPHAGEAL REFLUX DISEASE WITH ESOPHAGITIS WITHOUT HEMORRHAGE: ICD-10-CM

## 2023-12-27 RX ORDER — CALCIUM CARBONATE 500 MG/1
TABLET, CHEWABLE ORAL 3 TIMES DAILY
Qty: 90 TABLET | Refills: 4 | Status: SHIPPED | OUTPATIENT
Start: 2023-12-27

## 2023-12-28 DIAGNOSIS — E78.1 PURE HYPERTRIGLYCERIDEMIA: ICD-10-CM

## 2023-12-29 RX ORDER — ATORVASTATIN CALCIUM 20 MG/1
TABLET, FILM COATED ORAL
Qty: 90 TABLET | Refills: 2 | Status: SHIPPED | OUTPATIENT
Start: 2023-12-29

## 2024-01-30 DIAGNOSIS — E08.00 DIABETES MELLITUS DUE TO UNDERLYING CONDITION WITH HYPEROSMOLARITY WITHOUT COMA, UNSPECIFIED WHETHER LONG TERM INSULIN USE (H): ICD-10-CM

## 2024-02-27 DIAGNOSIS — K29.00 ACUTE SUPERFICIAL GASTRITIS WITHOUT HEMORRHAGE: ICD-10-CM

## 2024-02-27 RX ORDER — FAMOTIDINE 40 MG/1
40 TABLET, FILM COATED ORAL DAILY
Qty: 30 TABLET | Refills: 3 | Status: SHIPPED | OUTPATIENT
Start: 2024-02-27

## 2024-04-08 DIAGNOSIS — Z30.41 ENCOUNTER FOR SURVEILLANCE OF CONTRACEPTIVE PILLS: ICD-10-CM

## 2024-04-08 DIAGNOSIS — E08.00 DIABETES MELLITUS DUE TO UNDERLYING CONDITION WITH HYPEROSMOLARITY WITHOUT COMA, WITHOUT LONG-TERM CURRENT USE OF INSULIN (H): Primary | ICD-10-CM

## 2024-04-08 RX ORDER — LEVONORGESTREL/ETHIN.ESTRADIOL 0.1-0.02MG
1 TABLET ORAL DAILY
Qty: 84 TABLET | Refills: 3 | Status: SHIPPED | OUTPATIENT
Start: 2024-04-08

## 2024-04-08 NOTE — TELEPHONE ENCOUNTER
Bakari's patient. Has coming up appointment with Dr. Morales in May. Needing refill on Januvia, test strips and Vienva.

## 2024-04-08 NOTE — TELEPHONE ENCOUNTER
Orders placed as requested.  Please call patient to inform.    Suraj Hathaway MD  [facility]  4/8/2024  6:39 PM    Wilner was seen today for refill request.    Diagnoses and all orders for this visit:    Diabetes mellitus due to underlying condition with hyperosmolarity without coma, without long-term current use of insulin (H)  -     sitagliptin (JANUVIA) 100 MG tablet; Take 1 tablet (100 mg) by mouth daily  -     CONTOUR NEXT TEST test strip; Use to test blood sugar 3  times daily.    Encounter for surveillance of contraceptive pills  -     levonorgestrel-ethinyl estradiol (VIENVA) 0.1-20 MG-MCG tablet; Take 1 tablet by mouth daily

## 2024-04-10 ENCOUNTER — TELEPHONE (OUTPATIENT)
Dept: FAMILY MEDICINE | Facility: CLINIC | Age: 44
End: 2024-04-10

## 2024-04-10 NOTE — TELEPHONE ENCOUNTER
PRIOR AUTHORIZATION DENIED    Medication: SITAGLIPTIN PHOSPHATE 100 MG PO TABS  Insurance Company: [employer] - Phone [phone] Fax [phone]  Denial Date: 4/9/2024  Denial Reason(s):     Appeal Information:     Patient Notified: No

## 2024-05-02 ENCOUNTER — OFFICE VISIT (OUTPATIENT)
Dept: FAMILY MEDICINE | Facility: CLINIC | Age: 44
End: 2024-05-02
Payer: COMMERCIAL

## 2024-05-02 VITALS
OXYGEN SATURATION: 99 % | RESPIRATION RATE: 16 BRPM | HEART RATE: 84 BPM | WEIGHT: 124.04 LBS | DIASTOLIC BLOOD PRESSURE: 78 MMHG | SYSTOLIC BLOOD PRESSURE: 115 MMHG | HEIGHT: 58 IN | BODY MASS INDEX: 26.04 KG/M2 | TEMPERATURE: 97.2 F

## 2024-05-02 DIAGNOSIS — L20.82 FLEXURAL ECZEMA: ICD-10-CM

## 2024-05-02 DIAGNOSIS — Z76.89 ENCOUNTER TO ESTABLISH CARE: Primary | ICD-10-CM

## 2024-05-02 DIAGNOSIS — K64.4 EXTERNAL HEMORRHOIDS: ICD-10-CM

## 2024-05-02 DIAGNOSIS — E11.65 TYPE 2 DIABETES MELLITUS WITH HYPERGLYCEMIA, WITHOUT LONG-TERM CURRENT USE OF INSULIN (H): ICD-10-CM

## 2024-05-02 DIAGNOSIS — N89.8 VAGINAL ITCHING: ICD-10-CM

## 2024-05-02 DIAGNOSIS — N30.01 ACUTE CYSTITIS WITH HEMATURIA: ICD-10-CM

## 2024-05-02 LAB
ALBUMIN UR-MCNC: NEGATIVE MG/DL
APPEARANCE UR: CLEAR
BACTERIA #/AREA URNS HPF: ABNORMAL /HPF
BILIRUB UR QL STRIP: NEGATIVE
COLOR UR AUTO: YELLOW
GLUCOSE UR STRIP-MCNC: 500 MG/DL
HBA1C MFR BLD: 7 % (ref 0–5.6)
HGB UR QL STRIP: ABNORMAL
KETONES UR STRIP-MCNC: NEGATIVE MG/DL
LEUKOCYTE ESTERASE UR QL STRIP: ABNORMAL
MUCOUS THREADS #/AREA URNS LPF: PRESENT /LPF
NITRATE UR QL: NEGATIVE
PH UR STRIP: 6 [PH] (ref 5–7)
RBC #/AREA URNS AUTO: ABNORMAL /HPF
SP GR UR STRIP: 1.02 (ref 1–1.03)
SQUAMOUS #/AREA URNS AUTO: ABNORMAL /LPF
UROBILINOGEN UR STRIP-ACNC: 0.2 E.U./DL
WBC #/AREA URNS AUTO: ABNORMAL /HPF
WBC CLUMPS #/AREA URNS HPF: PRESENT /HPF

## 2024-05-02 PROCEDURE — G2211 COMPLEX E/M VISIT ADD ON: HCPCS | Performed by: FAMILY MEDICINE

## 2024-05-02 PROCEDURE — 83036 HEMOGLOBIN GLYCOSYLATED A1C: CPT | Performed by: FAMILY MEDICINE

## 2024-05-02 PROCEDURE — 36415 COLL VENOUS BLD VENIPUNCTURE: CPT | Performed by: FAMILY MEDICINE

## 2024-05-02 PROCEDURE — 81001 URINALYSIS AUTO W/SCOPE: CPT | Performed by: FAMILY MEDICINE

## 2024-05-02 PROCEDURE — 80053 COMPREHEN METABOLIC PANEL: CPT | Performed by: FAMILY MEDICINE

## 2024-05-02 PROCEDURE — 87086 URINE CULTURE/COLONY COUNT: CPT | Performed by: FAMILY MEDICINE

## 2024-05-02 PROCEDURE — 87186 SC STD MICRODIL/AGAR DIL: CPT | Performed by: FAMILY MEDICINE

## 2024-05-02 PROCEDURE — 99215 OFFICE O/P EST HI 40 MIN: CPT | Performed by: FAMILY MEDICINE

## 2024-05-02 RX ORDER — CLOBETASOL PROPIONATE 0.5 MG/G
OINTMENT TOPICAL 2 TIMES DAILY
Qty: 60 G | Refills: 3 | Status: SHIPPED | OUTPATIENT
Start: 2024-05-02

## 2024-05-02 RX ORDER — FLUCONAZOLE 150 MG/1
150 TABLET ORAL
Qty: 3 TABLET | Refills: 0 | Status: SHIPPED | OUTPATIENT
Start: 2024-05-02 | End: 2024-05-09

## 2024-05-02 RX ORDER — HYDROCORTISONE ACETATE 25 MG/1
25 SUPPOSITORY RECTAL 2 TIMES DAILY PRN
Qty: 24 SUPPOSITORY | Refills: 3 | Status: SHIPPED | OUTPATIENT
Start: 2024-05-02

## 2024-05-02 NOTE — PROGRESS NOTES
"  Assessment & Plan     Encounter to Establish Care  Chart reviewed and reconciled outside medications, problem list, and updated medical/surgical/obstetrical histories.   Declined mammo and hep B immunization today, will discuss at next visit.     Acute cystitis with hematuria  Patient had UTI in November and not treated - will add on culture. At that time, ecoli resistant to gent, bactrim, amp, smp/sulbactam  - UA Macroscopic with reflex to Microscopic and Culture - Lab Collect  - UA Macroscopic with reflex to Microscopic and Culture - Lab Collect  - UA Microscopic with Reflex to Culture  - Urine Culture Aerobic Bacterial - lab collect    Vaginal itching  Will trial treatment for recurrent yeast infection - has been a recurring issue in the past. Declined wet prep because she is on her period.   - fluconazole (DIFLUCAN) 150 MG tablet  Dispense: 3 tablet; Refill: 0    Type 2 diabetes mellitus with hyperglycemia, without long-term current use of insulin (H)  A1c technically at goal 7%. Check labs below.   - Hemoglobin A1c  - Comprehensive metabolic panel (BMP + Alb, Alk Phos, ALT, AST, Total. Bili, TP)  - Hemoglobin A1c  - Comprehensive metabolic panel (BMP + Alb, Alk Phos, ALT, AST, Total. Bili, TP)    Flexural eczema  On elbows, refilled clobetasol.   - clobetasol (TEMOVATE) 0.05 % external ointment  Dispense: 60 g; Refill: 3    External hemorrhoids  Declined exam and intervention, will trial topicals.   - phenylephrine-shark liver oil-mineral oil-petrolatum (PREPARATION H) 0.25-3-14-71.9 % rectal ointment  Dispense: 57 g; Refill: 3  - hydrocortisone (ANUSOL-HC) 25 MG suppository  Dispense: 24 suppository; Refill: 3              BMI  Estimated body mass index is 26.04 kg/m  as calculated from the following:    Height as of this encounter: 1.47 m (4' 9.87\").    Weight as of this encounter: 56.3 kg (124 lb 0.6 oz).   Weight management plan: Discussed healthy diet and exercise guidelines      Return in about 3 months " "(around 8/2/2024) for diabetes follow up, Medication Check.    45 minutes spent on the date of the encounter doing chart review, history and exam, documentation and further activities per the note    Subjective   Stacey is a 43 year old, presenting for the following health issues:  Establish Care    History of Present Illness       Reason for visit:  Est Care        Establish care - patient's pcp no longer at this clinic.   DM from previous pcp note:   Last A1c was 7.9 blood sugars have been in the 150s and 60s she forgot her glucometer today today's A1c.   Today:   From med rec, patient is on metformin 1000mg bid, januvia 100mg daily. A1c 7%, ok to continue and encouraged lifestyle modifications, healthy eating.     Gastroesophageal reflux disease with esophagitis without hemorrhage   No reflux symptoms no abdominal pain abdominal burning     Pure hypertriglyceridemia  Today:   Well controlled. No statin indicated     Vaginal itching   Vaginal itching and irritation check urinalysis today she refused a wet prep she has had the symptoms since August  Today:   Will recheck UA - UTI untreated in November, with her itching/discomfort will need to check again today with a culture          Objective    /78   Pulse 84   Temp 97.2  F (36.2  C) (Temporal)   Resp 16   Ht 1.47 m (4' 9.87\")   Wt 56.3 kg (124 lb 0.6 oz)   SpO2 99%   BMI 26.04 kg/m    Body mass index is 26.04 kg/m .  Physical Exam   GENERAL: alert and no distress  NECK: no adenopathy, no asymmetry, masses, or scars  RESP: lungs clear to auscultation - no rales, rhonchi or wheezes  CV: regular rate and rhythm, normal S1 S2, no S3 or S4, no murmur, click or rub, no peripheral edema  ABDOMEN: soft, nontender, no hepatosplenomegaly, no masses and bowel sounds normal  MS: no gross musculoskeletal defects noted, no edema    Results for orders placed or performed in visit on 05/02/24   Hemoglobin A1c     Status: Abnormal   Result Value Ref Range    Hemoglobin " A1C 7.0 (H) 0.0 - 5.6 %   UA Macroscopic with reflex to Microscopic and Culture - Lab Collect     Status: Abnormal    Specimen: Urine, Midstream   Result Value Ref Range    Color Urine Yellow Colorless, Straw, Light Yellow, Yellow    Appearance Urine Clear Clear    Glucose Urine 500 (A) Negative mg/dL    Bilirubin Urine Negative Negative    Ketones Urine Negative Negative mg/dL    Specific Gravity Urine 1.020 1.005 - 1.030    Blood Urine Large (A) Negative    pH Urine 6.0 5.0 - 7.0    Protein Albumin Urine Negative Negative mg/dL    Urobilinogen Urine 0.2 0.2, 1.0 E.U./dL    Nitrite Urine Negative Negative    Leukocyte Esterase Urine Small (A) Negative   UA Microscopic with Reflex to Culture     Status: Abnormal   Result Value Ref Range    Bacteria Urine Few (A) None Seen /HPF    RBC Urine 10-25 (A) 0-2 /HPF /HPF    WBC Urine 5-10 (A) 0-5 /HPF /HPF    Squamous Epithelials Urine Few (A) None Seen /LPF    WBC Clumps Urine Present (A) None Seen /HPF    Mucus Urine Present (A) None Seen /LPF    Narrative    Urine Culture not indicated     Results for orders placed or performed in visit on 05/02/24 (from the past 24 hour(s))   Hemoglobin A1c   Result Value Ref Range    Hemoglobin A1C 7.0 (H) 0.0 - 5.6 %   UA Macroscopic with reflex to Microscopic and Culture - Lab Collect    Specimen: Urine, Midstream   Result Value Ref Range    Color Urine Yellow Colorless, Straw, Light Yellow, Yellow    Appearance Urine Clear Clear    Glucose Urine 500 (A) Negative mg/dL    Bilirubin Urine Negative Negative    Ketones Urine Negative Negative mg/dL    Specific Gravity Urine 1.020 1.005 - 1.030    Blood Urine Large (A) Negative    pH Urine 6.0 5.0 - 7.0    Protein Albumin Urine Negative Negative mg/dL    Urobilinogen Urine 0.2 0.2, 1.0 E.U./dL    Nitrite Urine Negative Negative    Leukocyte Esterase Urine Small (A) Negative   UA Microscopic with Reflex to Culture   Result Value Ref Range    Bacteria Urine Few (A) None Seen /HPF    RBC  Urine 10-25 (A) 0-2 /HPF /HPF    WBC Urine 5-10 (A) 0-5 /HPF /HPF    Squamous Epithelials Urine Few (A) None Seen /LPF    WBC Clumps Urine Present (A) None Seen /HPF    Mucus Urine Present (A) None Seen /LPF    Narrative    Urine Culture not indicated           Signed Electronically by: Wendi Morales MD

## 2024-05-03 LAB
ALBUMIN SERPL BCG-MCNC: 4.3 G/DL (ref 3.5–5.2)
ALP SERPL-CCNC: 73 U/L (ref 40–150)
ALT SERPL W P-5'-P-CCNC: 23 U/L (ref 0–50)
ANION GAP SERPL CALCULATED.3IONS-SCNC: 11 MMOL/L (ref 7–15)
AST SERPL W P-5'-P-CCNC: 25 U/L (ref 0–45)
BILIRUB SERPL-MCNC: 0.3 MG/DL
BUN SERPL-MCNC: 7.3 MG/DL (ref 6–20)
CALCIUM SERPL-MCNC: 9.3 MG/DL (ref 8.6–10)
CHLORIDE SERPL-SCNC: 105 MMOL/L (ref 98–107)
CREAT SERPL-MCNC: 0.57 MG/DL (ref 0.51–0.95)
DEPRECATED HCO3 PLAS-SCNC: 26 MMOL/L (ref 22–29)
EGFRCR SERPLBLD CKD-EPI 2021: >90 ML/MIN/1.73M2
GLUCOSE SERPL-MCNC: 139 MG/DL (ref 70–99)
POTASSIUM SERPL-SCNC: 3.9 MMOL/L (ref 3.4–5.3)
PROT SERPL-MCNC: 7.4 G/DL (ref 6.4–8.3)
SODIUM SERPL-SCNC: 142 MMOL/L (ref 135–145)

## 2024-05-05 LAB — BACTERIA UR CULT: ABNORMAL

## 2024-05-16 ENCOUNTER — TRANSFERRED RECORDS (OUTPATIENT)
Dept: MULTI SPECIALTY CLINIC | Facility: CLINIC | Age: 44
End: 2024-05-16

## 2024-05-16 LAB — RETINOPATHY: NORMAL

## 2024-07-05 ENCOUNTER — PATIENT OUTREACH (OUTPATIENT)
Dept: CARE COORDINATION | Facility: CLINIC | Age: 44
End: 2024-07-05
Payer: COMMERCIAL

## 2024-07-19 ENCOUNTER — PATIENT OUTREACH (OUTPATIENT)
Dept: CARE COORDINATION | Facility: CLINIC | Age: 44
End: 2024-07-19
Payer: COMMERCIAL

## 2024-09-16 NOTE — TELEPHONE ENCOUNTER
Fax received from [facility], they have started the Prior Authorization Process via Cover My Meds    CoverMyMeds Key: [id number]    Medication Name: Januvia    Insurance Plan: [employer]  PBM:   Patient ID: not provided on fax    Please complete the PA process      .

## 2024-11-13 ENCOUNTER — OFFICE VISIT (OUTPATIENT)
Dept: FAMILY MEDICINE | Facility: CLINIC | Age: 44
End: 2024-11-13
Payer: COMMERCIAL

## 2024-11-13 VITALS
HEART RATE: 76 BPM | HEIGHT: 58 IN | BODY MASS INDEX: 26.24 KG/M2 | RESPIRATION RATE: 16 BRPM | OXYGEN SATURATION: 100 % | WEIGHT: 125 LBS | TEMPERATURE: 98.3 F | DIASTOLIC BLOOD PRESSURE: 83 MMHG | SYSTOLIC BLOOD PRESSURE: 124 MMHG

## 2024-11-13 DIAGNOSIS — Z30.41 ENCOUNTER FOR SURVEILLANCE OF CONTRACEPTIVE PILLS: ICD-10-CM

## 2024-11-13 DIAGNOSIS — Z23 IMMUNIZATION DUE: ICD-10-CM

## 2024-11-13 DIAGNOSIS — E78.1 PURE HYPERTRIGLYCERIDEMIA: ICD-10-CM

## 2024-11-13 DIAGNOSIS — Z12.31 VISIT FOR SCREENING MAMMOGRAM: ICD-10-CM

## 2024-11-13 DIAGNOSIS — K21.00 GASTROESOPHAGEAL REFLUX DISEASE WITH ESOPHAGITIS WITHOUT HEMORRHAGE: ICD-10-CM

## 2024-11-13 DIAGNOSIS — E11.65 TYPE 2 DIABETES MELLITUS WITH HYPERGLYCEMIA, WITHOUT LONG-TERM CURRENT USE OF INSULIN (H): Primary | ICD-10-CM

## 2024-11-13 DIAGNOSIS — N30.01 ACUTE CYSTITIS WITH HEMATURIA: ICD-10-CM

## 2024-11-13 DIAGNOSIS — K29.00 ACUTE SUPERFICIAL GASTRITIS WITHOUT HEMORRHAGE: ICD-10-CM

## 2024-11-13 LAB
ALBUMIN UR-MCNC: 30 MG/DL
APPEARANCE UR: ABNORMAL
BACTERIA #/AREA URNS HPF: ABNORMAL /HPF
BILIRUB UR QL STRIP: NEGATIVE
CHOLEST SERPL-MCNC: 122 MG/DL
COLOR UR AUTO: YELLOW
CREAT UR-MCNC: 115 MG/DL
EST. AVERAGE GLUCOSE BLD GHB EST-MCNC: 186 MG/DL
FASTING STATUS PATIENT QL REPORTED: NO
GLUCOSE UR STRIP-MCNC: 500 MG/DL
HBA1C MFR BLD: 8.1 % (ref 0–5.6)
HDLC SERPL-MCNC: 41 MG/DL
HGB UR QL STRIP: ABNORMAL
KETONES UR STRIP-MCNC: NEGATIVE MG/DL
LDLC SERPL CALC-MCNC: 50 MG/DL
LEUKOCYTE ESTERASE UR QL STRIP: ABNORMAL
MICROALBUMIN UR-MCNC: 189 MG/L
MICROALBUMIN/CREAT UR: 164.35 MG/G CR (ref 0–25)
MUCOUS THREADS #/AREA URNS LPF: PRESENT /LPF
NITRATE UR QL: NEGATIVE
NONHDLC SERPL-MCNC: 81 MG/DL
PH UR STRIP: 5.5 [PH] (ref 5–7)
RBC #/AREA URNS AUTO: ABNORMAL /HPF
SP GR UR STRIP: >=1.03 (ref 1–1.03)
SQUAMOUS #/AREA URNS AUTO: ABNORMAL /LPF
TRIGL SERPL-MCNC: 157 MG/DL
UROBILINOGEN UR STRIP-ACNC: 0.2 E.U./DL
WBC #/AREA URNS AUTO: ABNORMAL /HPF
WBC CLUMPS #/AREA URNS HPF: PRESENT /HPF

## 2024-11-13 PROCEDURE — 36415 COLL VENOUS BLD VENIPUNCTURE: CPT | Performed by: FAMILY MEDICINE

## 2024-11-13 PROCEDURE — 81001 URINALYSIS AUTO W/SCOPE: CPT | Performed by: FAMILY MEDICINE

## 2024-11-13 PROCEDURE — 80061 LIPID PANEL: CPT | Performed by: FAMILY MEDICINE

## 2024-11-13 PROCEDURE — 87186 SC STD MICRODIL/AGAR DIL: CPT | Performed by: FAMILY MEDICINE

## 2024-11-13 PROCEDURE — 82043 UR ALBUMIN QUANTITATIVE: CPT | Performed by: FAMILY MEDICINE

## 2024-11-13 PROCEDURE — 99214 OFFICE O/P EST MOD 30 MIN: CPT | Mod: 25 | Performed by: FAMILY MEDICINE

## 2024-11-13 PROCEDURE — 83036 HEMOGLOBIN GLYCOSYLATED A1C: CPT | Performed by: FAMILY MEDICINE

## 2024-11-13 PROCEDURE — 90656 IIV3 VACC NO PRSV 0.5 ML IM: CPT | Performed by: FAMILY MEDICINE

## 2024-11-13 PROCEDURE — 90471 IMMUNIZATION ADMIN: CPT | Performed by: FAMILY MEDICINE

## 2024-11-13 PROCEDURE — 82570 ASSAY OF URINE CREATININE: CPT | Performed by: FAMILY MEDICINE

## 2024-11-13 PROCEDURE — T1013 SIGN LANG/ORAL INTERPRETER: HCPCS | Performed by: INTERPRETER

## 2024-11-13 RX ORDER — FAMOTIDINE 40 MG/1
40 TABLET, FILM COATED ORAL DAILY
Qty: 30 TABLET | Refills: 3 | Status: SHIPPED | OUTPATIENT
Start: 2024-11-13

## 2024-11-13 RX ORDER — LEVONORGESTREL/ETHIN.ESTRADIOL 0.1-0.02MG
1 TABLET ORAL DAILY
Qty: 84 TABLET | Refills: 3 | Status: SHIPPED | OUTPATIENT
Start: 2024-11-13

## 2024-11-13 RX ORDER — CALCIUM CARBONATE 500 MG/1
1 TABLET, CHEWABLE ORAL 2 TIMES DAILY
Qty: 90 TABLET | Refills: 4 | Status: SHIPPED | OUTPATIENT
Start: 2024-11-13

## 2024-11-13 RX ORDER — ATORVASTATIN CALCIUM 20 MG/1
20 TABLET, FILM COATED ORAL DAILY
Qty: 90 TABLET | Refills: 3 | Status: SHIPPED | OUTPATIENT
Start: 2024-11-13

## 2024-11-13 NOTE — PROGRESS NOTES
Assessment & Plan     Type 2 diabetes mellitus with hyperglycemia, without long-term current use of insulin (H)  A1c went up to 8.1%, not at goal <7%. Continue metformin 2000mg daily, januvia 100mg daily. Add jardiance. Unable to identify and differences in diet or exercise in the past few months. Encouraged her to see dm educator, declined.   - Hemoglobin A1c  - Lipid panel reflex to direct LDL Non-fasting  - Albumin Random Urine Quantitative with Creat Ratio  - REVIEW OF HEALTH MAINTENANCE PROTOCOL ORDERS  - Hemoglobin A1c  - Lipid panel reflex to direct LDL Non-fasting  - Albumin Random Urine Quantitative with Creat Ratio  - empagliflozin (JARDIANCE) 25 MG TABS tablet  Dispense: 90 tablet; Refill: 1    Acute cystitis with hematuria  Treated last time, asymptomatic now. Glucosuria on ua. Waiting for culture.   - UA Macroscopic with reflex to Microscopic and Culture - Lab Collect  - UA Macroscopic with reflex to Microscopic and Culture - Lab Collect  - UA Microscopic with Reflex to Culture  - Urine Culture  ADDENDUM 11/18/24   Patient asymptomatic but positive urine culture with multiple resistance.   - omnicef 300mg bid for 7 days based on cultures.     Visit for screening mammogram  Declined.     Immunization due  - INFLUENZA VACCINE, SPLIT VIRUS, TRIVALENT,PF (FLUZONE\FLUARIX)    Pure hypertriglyceridemia  - atorvastatin (LIPITOR) 20 MG tablet  Dispense: 90 tablet; Refill: 3    Encounter for surveillance of contraceptive pills  - levonorgestrel-ethinyl estradiol (VIENVA) 0.1-20 MG-MCG tablet  Dispense: 84 tablet; Refill: 3    Acute superficial gastritis without hemorrhage  - famotidine (PEPCID) 40 MG tablet  Dispense: 30 tablet; Refill: 3    Gastroesophageal reflux disease with esophagitis without hemorrhage  - calcium carbonate (ANTACID REGULAR STRENGTH) 500 MG chewable tablet  Dispense: 90 tablet; Refill: 4            BMI  Estimated body mass index is 26.24 kg/m  as calculated from the following:    Height  "as of this encounter: 1.47 m (4' 9.87\").    Weight as of this encounter: 56.7 kg (125 lb).   Weight management plan: Discussed healthy diet and exercise guidelines      Return in about 3 months (around 2/13/2025) for diabetes follow up.      Petr Ibarra is a 44 year old, presenting for the following health issues:  Diabetes and Recheck Medication    History of Present Illness       Diabetes:   She presents for follow up of diabetes.  She is checking home blood glucose one time daily.   She checks blood glucose before meals.  Blood glucose is never over 200 and never under 70. She is aware of hypoglycemia symptoms including other.    She has no concerns regarding her diabetes at this time.  She is having blurry vision.            Reason for visit:  Med check        Acute cystitis with hematuria  Patient had UTI in November and not treated - will add on culture. At that time, ecoli resistant to gent, bactrim, amp, smp/sulbactam  Today:   Asymptomatic today but rechecking urine anyway.         Type 2 diabetes mellitus with hyperglycemia, without long-term current use of insulin (H)  A1c technically at goal 7%. Check labs below.   Today:   Went up. She drinks birdy sometimes but can't think of any changes in the past few months.         Flexural eczema  On elbows, refilled clobetasol.   - clobetasol (TEMOVATE) 0.05 % external ointment  Dispense: 60 g; Refill: 3     External hemorrhoids  Declined exam and intervention, will trial topicals.   Today:   Improved.              Objective    /83   Pulse 76   Temp 98.3  F (36.8  C) (Oral)   Resp 16   Ht 1.47 m (4' 9.87\")   Wt 56.7 kg (125 lb)   LMP  (LMP Unknown)   SpO2 100%   BMI 26.24 kg/m    Body mass index is 26.24 kg/m .  Physical Exam   GENERAL: alert and no distress  NECK: no adenopathy, no asymmetry, masses, or scars  RESP: lungs clear to auscultation - no rales, rhonchi or wheezes  CV: regular rate and rhythm, normal S1 S2, no S3 or S4, no murmur, " click or rub, no peripheral edema  ABDOMEN: soft, nontender, no hepatosplenomegaly, no masses and bowel sounds normal  MS: no gross musculoskeletal defects noted, no edema    Results for orders placed or performed in visit on 11/13/24   Hemoglobin A1c     Status: Abnormal   Result Value Ref Range    Estimated Average Glucose 186 (H) <117 mg/dL    Hemoglobin A1C 8.1 (H) 0.0 - 5.6 %    Narrative    Results reviewed, correlates with previous.     UA Macroscopic with reflex to Microscopic and Culture - Lab Collect     Status: Abnormal    Specimen: Urine, NOS   Result Value Ref Range    Color Urine Yellow Colorless, Straw, Light Yellow, Yellow    Appearance Urine Cloudy (A) Clear    Glucose Urine 500 (A) Negative mg/dL    Bilirubin Urine Negative Negative    Ketones Urine Negative Negative mg/dL    Specific Gravity Urine >=1.030 1.005 - 1.030    Blood Urine Small (A) Negative    pH Urine 5.5 5.0 - 7.0    Protein Albumin Urine 30 (A) Negative mg/dL    Urobilinogen Urine 0.2 0.2, 1.0 E.U./dL    Nitrite Urine Negative Negative    Leukocyte Esterase Urine Small (A) Negative   UA Microscopic with Reflex to Culture     Status: Abnormal   Result Value Ref Range    Bacteria Urine Few (A) None Seen /HPF    RBC Urine 0-2 0-2 /HPF /HPF    WBC Urine  (A) 0-5 /HPF /HPF    Squamous Epithelials Urine Few (A) None Seen /LPF    WBC Clumps Urine Present (A) None Seen /HPF    Mucus Urine Present (A) None Seen /LPF     Results for orders placed or performed in visit on 11/13/24 (from the past 24 hours)   Hemoglobin A1c   Result Value Ref Range    Estimated Average Glucose 186 (H) <117 mg/dL    Hemoglobin A1C 8.1 (H) 0.0 - 5.6 %    Narrative    Results reviewed, correlates with previous.     UA Macroscopic with reflex to Microscopic and Culture - Lab Collect    Specimen: Urine, NOS   Result Value Ref Range    Color Urine Yellow Colorless, Straw, Light Yellow, Yellow    Appearance Urine Cloudy (A) Clear    Glucose Urine 500 (A) Negative  mg/dL    Bilirubin Urine Negative Negative    Ketones Urine Negative Negative mg/dL    Specific Gravity Urine >=1.030 1.005 - 1.030    Blood Urine Small (A) Negative    pH Urine 5.5 5.0 - 7.0    Protein Albumin Urine 30 (A) Negative mg/dL    Urobilinogen Urine 0.2 0.2, 1.0 E.U./dL    Nitrite Urine Negative Negative    Leukocyte Esterase Urine Small (A) Negative   UA Microscopic with Reflex to Culture   Result Value Ref Range    Bacteria Urine Few (A) None Seen /HPF    RBC Urine 0-2 0-2 /HPF /HPF    WBC Urine  (A) 0-5 /HPF /HPF    Squamous Epithelials Urine Few (A) None Seen /LPF    WBC Clumps Urine Present (A) None Seen /HPF    Mucus Urine Present (A) None Seen /LPF           Signed Electronically by: Wendi Morales MD

## 2024-11-16 LAB
BACTERIA UR CULT: ABNORMAL
BACTERIA UR CULT: ABNORMAL

## 2024-11-19 ENCOUNTER — TELEPHONE (OUTPATIENT)
Dept: FAMILY MEDICINE | Facility: CLINIC | Age: 44
End: 2024-11-19
Payer: COMMERCIAL

## 2024-11-19 NOTE — LETTER
November 21, 2024      Stacey Melendez  [address] [place] [address]        Dear ,    We are writing to inform you of your test results.    Dr. Morales says you continue to have a bladder infection.  She sent in an antibiotic, called Cefdinir 300 MG Oral Capsule (OMNICEF) to [facility] - [place], [place] - [address]  You will take this medicine twice per day for 7 days.  We will recheck your urine at your next visit.  If you have fever or burning when you pee, you should be seen sooner.    Resulted Orders   Hemoglobin A1c   Result Value Ref Range    Estimated Average Glucose 186 (H) <117 mg/dL    Hemoglobin A1C 8.1 (H) 0.0 - 5.6 %      Comment:      Normal <5.7%   Prediabetes 5.7-6.4%    Diabetes 6.5% or higher     Note: Adopted from ADA consensus guidelines.    Narrative    Results reviewed, correlates with previous.     Lipid panel reflex to direct LDL Non-fasting   Result Value Ref Range    Cholesterol 122 <200 mg/dL    Triglycerides 157 (H) <150 mg/dL    Direct Measure HDL 41 (L) >=50 mg/dL    LDL Cholesterol Calculated 50 <100 mg/dL    Non HDL Cholesterol 81 <130 mg/dL    Patient Fasting > 8hrs? No     Narrative    Cholesterol  Desirable: < 200 mg/dL  Borderline High: 200 - 239 mg/dL  High: >= 240 mg/dL    Triglycerides  Normal: < 150 mg/dL  Borderline High: 150 - 199 mg/dL  High: 200-499 mg/dL  Very High: >= 500 mg/dL    Direct Measure HDL  Female: >= 50 mg/dL   Male: >= 40 mg/dL    LDL Cholesterol  Desirable: < 100 mg/dL  Above Desirable: 100 - 129 mg/dL   Borderline High: 130 - 159 mg/dL   High:  160 - 189 mg/dL   Very High: >= 190 mg/dL    Non HDL Cholesterol  Desirable: < 130 mg/dL  Above Desirable: 130 - 159 mg/dL  Borderline High: 160 - 189 mg/dL  High: 190 - 219 mg/dL  Very High: >= 220 mg/dL   Albumin Random Urine Quantitative with Creat Ratio   Result Value Ref Range    Creatinine Urine mg/dL 115.0 mg/dL      Comment:      The reference ranges have not been established in  urine creatinine. The results should be integrated into the clinical context for interpretation.    Albumin Urine mg/L 189.0 mg/L      Comment:      The reference ranges have not been established in urine albumin. The results should be integrated into the clinical context for interpretation.    Albumin Urine mg/g Cr 164.35 (H) 0.00 - 25.00 mg/g Cr      Comment:      Microalbuminuria is defined as an albumin:creatinine ratio of 17 to 299 for males and 25 to 299 for females. A ratio of albumin:creatinine of 300 or higher is indicative of overt proteinuria.  Due to biologic variability, positive results should be confirmed by a second, first-morning random or 24-hour timed urine specimen. If there is discrepancy, a third specimen is recommended. When 2 out of 3 results are in the microalbuminuria range, this is evidence for incipient nephropathy and warrants increased efforts at glucose control, blood pressure control, and institution of therapy with an angiotensin-converting-enzyme (ACE) inhibitor (if the patient can tolerate it).     UA Macroscopic with reflex to Microscopic and Culture - Lab Collect   Result Value Ref Range    Color Urine Yellow Colorless, Straw, Light Yellow, Yellow    Appearance Urine Cloudy (A) Clear    Glucose Urine 500 (A) Negative mg/dL    Bilirubin Urine Negative Negative    Ketones Urine Negative Negative mg/dL    Specific Gravity Urine >=1.030 1.005 - 1.030    Blood Urine Small (A) Negative    pH Urine 5.5 5.0 - 7.0    Protein Albumin Urine 30 (A) Negative mg/dL    Urobilinogen Urine 0.2 0.2, 1.0 E.U./dL    Nitrite Urine Negative Negative    Leukocyte Esterase Urine Small (A) Negative   UA Microscopic with Reflex to Culture   Result Value Ref Range    Bacteria Urine Few (A) None Seen /HPF    RBC Urine 0-2 0-2 /HPF /HPF    WBC Urine  (A) 0-5 /HPF /HPF    Squamous Epithelials Urine Few (A) None Seen /LPF    WBC Clumps Urine Present (A) None Seen /HPF    Mucus Urine Present (A) None Seen  /LPF   Urine Culture   Result Value Ref Range    Culture 10,000-50,000 CFU/mL Escherichia coli (A)     Culture 10,000-50,000 CFU/mL Escherichia coli (A)        If you have any questions or concerns, please call the clinic at the number listed above.       Sincerely,    Your [facility] Team

## 2024-11-19 NOTE — TELEPHONE ENCOUNTER
----- Message from Wendi Morales sent at 11/18/2024  4:51 PM CST -----  Please let patient know she continues to have a urine or bladder infection. We sent an antibiotic called omnicef that she should take twice per day for 7 days. We will recheck her urine at the next visit. If she has fevers or burning when peeing, we should see her sooner.

## 2024-11-19 NOTE — TELEPHONE ENCOUNTER
Writer called patient with [facility]  ID# [id number]:  -Called patient twice and no answer/option to leave voicemail.    Consent to communicate on file with Sa.  Writer called Sa with [facility]  ID# [id number]: person answered phone but was not Sa.  Phone call ended.    Recall patient at another time.    MEHNAZ Church, RN-BC  [facility]

## 2024-11-21 NOTE — TELEPHONE ENCOUNTER
Writer called patient with [facility]  ID# [id number]:  Left message to call back and ask to speak with an available nurse.    Writer called [facility] and was informed Cefdinir has not been picked up.    Letter mailed to patient.    HERON ChurchN, RN-[facility]

## 2024-11-23 NOTE — PATIENT INSTRUCTIONS - HE
Patient Instructions by Barby Anderson PT at 1/31/2019  3:00 PM     Author: Barby Anderson PT Service: -- Author Type: Physical Therapist    Filed: 1/31/2019  3:19 PM Encounter Date: 1/31/2019 Status: Signed    : Barby Anderson PT (Physical Therapist)        ELASTIC BAND SHOULDER EXTERNAL ROTATION    While holding an elastic band at your side with your elbow bent, start with your hand near your stomach and then pull the band away. Keep your elbow at your side the entire time. x10 reps, x2-3 sets      Pec Minor Stretch    Lying over foam roll or towel roll running vertically up spine, let arms hang out to sides feeling a stretch across the chest. Hold 30-60 sec, x2-3 reps, x1-2 times a day                
show

## 2025-02-13 ENCOUNTER — OFFICE VISIT (OUTPATIENT)
Dept: FAMILY MEDICINE | Facility: CLINIC | Age: 45
End: 2025-02-13
Payer: COMMERCIAL

## 2025-02-13 VITALS
WEIGHT: 122 LBS | RESPIRATION RATE: 16 BRPM | SYSTOLIC BLOOD PRESSURE: 121 MMHG | TEMPERATURE: 97.5 F | HEART RATE: 82 BPM | DIASTOLIC BLOOD PRESSURE: 81 MMHG | BODY MASS INDEX: 25.61 KG/M2 | HEIGHT: 58 IN | OXYGEN SATURATION: 99 %

## 2025-02-13 DIAGNOSIS — K21.00 GASTROESOPHAGEAL REFLUX DISEASE WITH ESOPHAGITIS WITHOUT HEMORRHAGE: ICD-10-CM

## 2025-02-13 DIAGNOSIS — G56.03 BILATERAL CARPAL TUNNEL SYNDROME: ICD-10-CM

## 2025-02-13 DIAGNOSIS — E11.65 TYPE 2 DIABETES MELLITUS WITH HYPERGLYCEMIA, WITHOUT LONG-TERM CURRENT USE OF INSULIN (H): ICD-10-CM

## 2025-02-13 DIAGNOSIS — B37.31 YEAST INFECTION OF THE VAGINA: ICD-10-CM

## 2025-02-13 DIAGNOSIS — N30.01 ACUTE CYSTITIS WITH HEMATURIA: Primary | ICD-10-CM

## 2025-02-13 DIAGNOSIS — Z30.41 ENCOUNTER FOR SURVEILLANCE OF CONTRACEPTIVE PILLS: ICD-10-CM

## 2025-02-13 DIAGNOSIS — E78.1 PURE HYPERTRIGLYCERIDEMIA: ICD-10-CM

## 2025-02-13 LAB
ALBUMIN UR-MCNC: NEGATIVE MG/DL
APPEARANCE UR: CLEAR
BILIRUB UR QL STRIP: NEGATIVE
COLOR UR AUTO: YELLOW
EST. AVERAGE GLUCOSE BLD GHB EST-MCNC: 214 MG/DL
GLUCOSE UR STRIP-MCNC: 500 MG/DL
HBA1C MFR BLD: 9.1 % (ref 0–5.6)
HGB UR QL STRIP: NEGATIVE
KETONES UR STRIP-MCNC: NEGATIVE MG/DL
LEUKOCYTE ESTERASE UR QL STRIP: NEGATIVE
NITRATE UR QL: NEGATIVE
PH UR STRIP: 6 [PH] (ref 5–7)
SP GR UR STRIP: 1.02 (ref 1–1.03)
UROBILINOGEN UR STRIP-ACNC: 0.2 E.U./DL

## 2025-02-13 RX ORDER — FLUCONAZOLE 150 MG/1
150 TABLET ORAL ONCE
Qty: 1 TABLET | Refills: 0 | Status: SHIPPED | OUTPATIENT
Start: 2025-02-13 | End: 2025-02-13

## 2025-02-13 RX ORDER — GABAPENTIN 300 MG/1
300 CAPSULE ORAL AT BEDTIME
Qty: 90 CAPSULE | Refills: 1 | Status: SHIPPED | OUTPATIENT
Start: 2025-02-13

## 2025-02-13 RX ORDER — LEVONORGESTREL/ETHIN.ESTRADIOL 0.1-0.02MG
1 TABLET ORAL DAILY
Qty: 84 TABLET | Refills: 3 | Status: SHIPPED | OUTPATIENT
Start: 2025-02-13

## 2025-02-13 RX ORDER — ATORVASTATIN CALCIUM 20 MG/1
20 TABLET, FILM COATED ORAL DAILY
Qty: 90 TABLET | Refills: 3 | Status: SHIPPED | OUTPATIENT
Start: 2025-02-13

## 2025-02-13 NOTE — PROGRESS NOTES
Assessment & Plan     Type 2 diabetes mellitus with hyperglycemia, without long-term current use of insulin (H)   A1c Increased to 9.1%, patient has not had medications in a few weeks. Copay for some medications have gone higher and I think it's due to her new insurance. Will refer to [facility] to look into further but if not affordable for her copays for medications, will see if we have to change to more affordable medications.   - Basic metabolic panel  (Ca, Cl, CO2, Creat, Gluc, K, Na, BUN)  - FOOT EXAM  - HEMOGLOBIN A1C  - Basic metabolic panel  (Ca, Cl, CO2, Creat, Gluc, K, Na, BUN)  - HEMOGLOBIN A1C  - empagliflozin (JARDIANCE) 25 MG TABS tablet  Dispense: 30 tablet; Refill: 11  - metFORMIN (GLUCOPHAGE) 1000 MG tablet  Dispense: 60 tablet; Refill: 11  - sitagliptin (JANUVIA) 100 MG tablet  Dispense: 30 tablet; Refill: 11    Acute cystitis with hematuria  Resolved.   - UA Macroscopic with reflex to Microscopic and Culture - Lab Collect    Bilateral carpal tunnel syndrome  - gabapentin (NEURONTIN) 300 MG capsule  Dispense: 90 capsule; Refill: 1    Gastroesophageal reflux disease with esophagitis without hemorrhage  Denies any symptoms.       Pure hypertriglyceridemia  - atorvastatin (LIPITOR) 20 MG tablet  Dispense: 90 tablet; Refill: 3    Encounter for surveillance of contraceptive pills  - levonorgestrel-ethinyl estradiol (VIENVA) 0.1-20 MG-MCG tablet  Dispense: 84 tablet; Refill: 3    Yeast infection of the vagina  Likely due to hyperglycemia but consider discontinuing jardiance if continues.   - fluconazole (DIFLUCAN) 150 MG tablet  Dispense: 1 tablet; Refill: 0              Return in about 1 month (around 3/13/2025) for diabetes follow up.    The longitudinal plan of care for the diagnosis(es)/condition(s) as documented were addressed during this visit. Due to the added complexity in care, I will continue to support Stacey in the subsequent management and with ongoing continuity of care.    Subjective   Stacey is a  "44 year old, presenting for the following health issues:  Diabetes    History of Present Illness       Diabetes:   She presents for follow up of diabetes.  She is checking home blood glucose a few times a week.   She checks blood glucose before meals.  Blood glucose is sometimes over 200 and never under 70. She is aware of hypoglycemia symptoms including shakiness.   She is concerned about blood sugar frequently over 200 and other.    She is not experiencing numbness or burning in feet, excessive thirst, blurry vision, weight changes or redness, sores or blisters on feet.                   Type 2 diabetes mellitus with hyperglycemia, without long-term current use of insulin (H)  A1c went up to 8.1%, not at goal <7%. Continue metformin 2000mg daily, januvia 100mg daily. Add jardiance. Unable to identify and differences in diet or exercise in the past few months. Encouraged her to see dm educator, landen.   Today:   She did not have her medications for a few weeks... though I suspect longer than that if med rec is correct. She has blurry vision, neuropathy that comes and goes.       Acute cystitis with hematuria  Treated last time, asymptomatic now. Glucosuria on ua. Waiting for culture.   - UA Macroscopic with reflex to Microscopic and Culture - Lab Collect  - UA Macroscopic with reflex to Microscopic and Culture - Lab Collect  - UA Microscopic with Reflex to Culture  - Urine Culture  ADDENDUM 11/18/24   Patient asymptomatic but positive urine culture with multiple resistance.   - omnicef 300mg bid for 7 days based on cultures.   Today:   Unclear if she picked up the omnicef but UA is clear today.                Objective    /81   Pulse 82   Temp 97.5  F (36.4  C) (Temporal)   Resp 16   Ht 1.47 m (4' 9.87\")   Wt 55.3 kg (122 lb)   SpO2 99%   BMI 25.61 kg/m    Body mass index is 25.61 kg/m .  Physical Exam   GENERAL: alert and no distress  NECK: no adenopathy, no asymmetry, masses, or scars  RESP: lungs " clear to auscultation - no rales, rhonchi or wheezes  CV: regular rate and rhythm, normal S1 S2, no S3 or S4, no murmur, click or rub, no peripheral edema  ABDOMEN: soft, nontender, no hepatosplenomegaly, no masses and bowel sounds normal  MS: no gross musculoskeletal defects noted, no edema  Diabetic foot exam: normal DP and PT pulses, no trophic changes or ulcerative lesions, and normal sensory exam      Results for orders placed or performed in visit on 02/13/25   HEMOGLOBIN A1C     Status: Abnormal   Result Value Ref Range    Estimated Average Glucose 214 (H) <117 mg/dL    Hemoglobin A1C 9.1 (H) 0.0 - 5.6 %   UA Macroscopic with reflex to Microscopic and Culture - Lab Collect     Status: Abnormal    Specimen: Urine, NOS   Result Value Ref Range    Color Urine Yellow Colorless, Straw, Light Yellow, Yellow    Appearance Urine Clear Clear    Glucose Urine 500 (A) Negative mg/dL    Bilirubin Urine Negative Negative    Ketones Urine Negative Negative mg/dL    Specific Gravity Urine 1.020 1.005 - 1.030    Blood Urine Negative Negative    pH Urine 6.0 5.0 - 7.0    Protein Albumin Urine Negative Negative mg/dL    Urobilinogen Urine 0.2 0.2, 1.0 E.U./dL    Nitrite Urine Negative Negative    Leukocyte Esterase Urine Negative Negative    Narrative    Microscopic not indicated     Results for orders placed or performed in visit on 02/13/25 (from the past 24 hours)   HEMOGLOBIN A1C   Result Value Ref Range    Estimated Average Glucose 214 (H) <117 mg/dL    Hemoglobin A1C 9.1 (H) 0.0 - 5.6 %   UA Macroscopic with reflex to Microscopic and Culture - Lab Collect    Specimen: Urine, NOS   Result Value Ref Range    Color Urine Yellow Colorless, Straw, Light Yellow, Yellow    Appearance Urine Clear Clear    Glucose Urine 500 (A) Negative mg/dL    Bilirubin Urine Negative Negative    Ketones Urine Negative Negative mg/dL    Specific Gravity Urine 1.020 1.005 - 1.030    Blood Urine Negative Negative    pH Urine 6.0 5.0 - 7.0     Protein Albumin Urine Negative Negative mg/dL    Urobilinogen Urine 0.2 0.2, 1.0 E.U./dL    Nitrite Urine Negative Negative    Leukocyte Esterase Urine Negative Negative    Narrative    Microscopic not indicated           Signed Electronically by: Wendi Morales MD

## 2025-05-01 ENCOUNTER — TRANSFERRED RECORDS (OUTPATIENT)
Dept: MULTI SPECIALTY CLINIC | Facility: CLINIC | Age: 45
End: 2025-05-01

## 2025-05-01 ENCOUNTER — OFFICE VISIT (OUTPATIENT)
Dept: FAMILY MEDICINE | Facility: CLINIC | Age: 45
End: 2025-05-01
Payer: COMMERCIAL

## 2025-05-01 VITALS
SYSTOLIC BLOOD PRESSURE: 109 MMHG | HEART RATE: 89 BPM | TEMPERATURE: 98.3 F | OXYGEN SATURATION: 99 % | HEIGHT: 57 IN | RESPIRATION RATE: 16 BRPM | WEIGHT: 129 LBS | BODY MASS INDEX: 27.83 KG/M2 | DIASTOLIC BLOOD PRESSURE: 73 MMHG

## 2025-05-01 DIAGNOSIS — G56.03 BILATERAL CARPAL TUNNEL SYNDROME: ICD-10-CM

## 2025-05-01 DIAGNOSIS — E11.65 TYPE 2 DIABETES MELLITUS WITH HYPERGLYCEMIA, WITHOUT LONG-TERM CURRENT USE OF INSULIN (H): ICD-10-CM

## 2025-05-01 DIAGNOSIS — Z12.31 VISIT FOR SCREENING MAMMOGRAM: ICD-10-CM

## 2025-05-01 LAB
EST. AVERAGE GLUCOSE BLD GHB EST-MCNC: 171 MG/DL
HBA1C MFR BLD: 7.6 % (ref 0–5.6)
RETINOPATHY: NORMAL

## 2025-05-01 RX ORDER — GABAPENTIN 300 MG/1
300 CAPSULE ORAL AT BEDTIME
Qty: 90 CAPSULE | Refills: 3 | Status: SHIPPED | OUTPATIENT
Start: 2025-05-01

## 2025-05-01 RX ORDER — GLIPIZIDE 5 MG/1
5 TABLET, FILM COATED, EXTENDED RELEASE ORAL DAILY
Qty: 90 TABLET | Refills: 1 | Status: SHIPPED | OUTPATIENT
Start: 2025-05-01

## 2025-05-01 ASSESSMENT — PAIN SCALES - GENERAL: PAINLEVEL_OUTOF10: MODERATE PAIN (6)

## 2025-05-01 NOTE — PROGRESS NOTES
Assessment & Plan     Type 2 diabetes mellitus with hyperglycemia, without long-term current use of insulin (H)  A1c improved to 7.6% from 9.1%. Delanouvia and Jardiance $25/month and she does not think she can afford it. She had significant improvement with glipizide and we discussed switching to that instead but with higher risk of hypoglycemia - she wants to do that instead. Follow up 3 months.   - HEMOGLOBIN A1C  - HEMOGLOBIN A1C  - Adult Eye  Referral  - glipiZIDE (GLUCOTROL XL) 5 MG 24 hr tablet  Dispense: 90 tablet; Refill: 1    Visit for screening mammogram  Refused.     Bilateral carpal tunnel syndrome  Continue.   - gabapentin (NEURONTIN) 300 MG capsule  Dispense: 90 capsule; Refill: 3        A1C and update  05/01/25 - A1c improved to 7.6%. due to cost, patient wants to change to just glipizide.   Diabetes Plan   Oral meds:      metformin 1000mg bid (need to double check if she is taking 1 or 2)   Glipizide XL 5mg (increased 05/01/25)      Jardiance and januvia stopped only because of cost  Insulin:            -  GLP1:             -  Eye Exam:     5/2024  Statin:         Atorvastatin 20  ACE/ARB: Declined - ask again in 2026          Return in about 3 months (around 8/1/2025) for Routine preventive, diabetes follow up.      Subjective   Stacey is a 44 year old, presenting for the following health issues:  Diabetes (Follow up )    History of Present Illness       Diabetes:   She presents for follow up of diabetes.  She is checking home blood glucose one time daily.   She checks blood glucose before meals.  Blood glucose is sometimes over 200 and never under 70. She is aware of hypoglycemia symptoms including none and other. When her blood glucose is low, the patient is asymptomatic for confusion, blurred vision, lethargy and reports not feeling dizzy, shaky, or weak.   She has no concerns regarding her diabetes at this time.   She is not experiencing numbness or burning in feet, excessive thirst,  "blurry vision, weight changes or redness, sores or blisters on feet. The patient has had a diabetic eye exam in the last 12 months. Eye exam performed on 05/16/24. Location of last eye exam [facility].               Visit for screening mammogram  Refused.      Type 2 diabetes mellitus with hyperglycemia, without long-term current use of insulin (H)  Back on her januvia, but jardiance is $25/month and for her that is not affordable. She is on maximum januvia and metformin. Recommend changing januvia to morning, start very low dose glipizide XL 2.5mg and close follow up. She only checks her morning glucose, but with high A1c, she can likely tolerate low dose glipizide. Fasting glucose averaging 160.   - glipiZIDE (GLUCOTROL XL) 2.5 MG 24 hr tablet  Dispense: 30 tablet; Refill: 2  Today:   The januvia is also more expensive and she does not know if she is able to afford that for long.              Objective    /73   Pulse 89   Temp 98.3  F (36.8  C) (Oral)   Resp 16   Ht 1.448 m (4' 9\")   Wt 58.5 kg (129 lb)   LMP  (LMP Unknown)   SpO2 99%   BMI 27.92 kg/m    Body mass index is 27.92 kg/m .  Physical Exam   GENERAL: alert and no distress  NECK: no adenopathy, no asymmetry, masses, or scars  RESP: lungs clear to auscultation - no rales, rhonchi or wheezes  CV: regular rate and rhythm, normal S1 S2, no S3 or S4, no murmur, click or rub, no peripheral edema  ABDOMEN: soft, nontender, no hepatosplenomegaly, no masses and bowel sounds normal  MS: no gross musculoskeletal defects noted, no edema    Results for orders placed or performed in visit on 05/01/25   HEMOGLOBIN A1C     Status: Abnormal   Result Value Ref Range    Estimated Average Glucose 171 (H) <117 mg/dL    Hemoglobin A1C 7.6 (H) 0.0 - 5.6 %     Results for orders placed or performed in visit on 05/01/25 (from the past 24 hours)   HEMOGLOBIN A1C   Result Value Ref Range    Estimated Average Glucose 171 (H) <117 mg/dL    Hemoglobin " A1C 7.6 (H) 0.0 - 5.6 %           Signed Electronically by: Wendi Morales MD

## 2025-05-05 ENCOUNTER — PATIENT OUTREACH (OUTPATIENT)
Dept: CARE COORDINATION | Facility: CLINIC | Age: 45
End: 2025-05-05
Payer: COMMERCIAL

## 2025-05-12 ENCOUNTER — TELEPHONE (OUTPATIENT)
Dept: FAMILY MEDICINE | Facility: CLINIC | Age: 45
End: 2025-05-12
Payer: COMMERCIAL

## 2025-05-12 NOTE — TELEPHONE ENCOUNTER
Patient Quality Outreach    Patient is due for the following:   Diabetes -  Eye Exam  Breast Cancer Screening - Mammogram    Action(s) Taken:   Patient has upcoming appointment, these items will be addressed at that time.    Type of outreach:    Chart review performed, no outreach needed.    Questions for provider review:    None         Fawad Beauchamp CMA  Chart routed to None.

## 2025-07-04 DIAGNOSIS — K21.00 GASTROESOPHAGEAL REFLUX DISEASE WITH ESOPHAGITIS WITHOUT HEMORRHAGE: ICD-10-CM

## 2025-07-07 RX ORDER — CALCIUM CARBONATE 500 MG/1
TABLET, CHEWABLE ORAL 2 TIMES DAILY
Qty: 90 TABLET | Refills: 4 | OUTPATIENT
Start: 2025-07-07

## 2025-07-07 NOTE — TELEPHONE ENCOUNTER
Called patient and she stated she does not need Rx anymore. Will disregard refill request.      Serjio Luque, BSN RN, PHN  [facility]

## 2025-08-06 ENCOUNTER — OFFICE VISIT (OUTPATIENT)
Dept: FAMILY MEDICINE | Facility: CLINIC | Age: 45
End: 2025-08-06
Payer: COMMERCIAL

## 2025-08-06 ENCOUNTER — ANCILLARY PROCEDURE (OUTPATIENT)
Dept: MAMMOGRAPHY | Facility: CLINIC | Age: 45
End: 2025-08-06
Attending: FAMILY MEDICINE
Payer: COMMERCIAL

## 2025-08-06 VITALS
HEIGHT: 58 IN | TEMPERATURE: 99.1 F | DIASTOLIC BLOOD PRESSURE: 73 MMHG | BODY MASS INDEX: 27.52 KG/M2 | SYSTOLIC BLOOD PRESSURE: 111 MMHG | OXYGEN SATURATION: 99 % | WEIGHT: 131.12 LBS | RESPIRATION RATE: 16 BRPM | HEART RATE: 77 BPM

## 2025-08-06 DIAGNOSIS — Z00.00 ROUTINE GENERAL MEDICAL EXAMINATION AT A HEALTH CARE FACILITY: ICD-10-CM

## 2025-08-06 DIAGNOSIS — Z12.31 VISIT FOR SCREENING MAMMOGRAM: ICD-10-CM

## 2025-08-06 DIAGNOSIS — Z12.31 VISIT FOR SCREENING MAMMOGRAM: Primary | ICD-10-CM

## 2025-08-06 DIAGNOSIS — E11.65 TYPE 2 DIABETES MELLITUS WITH HYPERGLYCEMIA, WITHOUT LONG-TERM CURRENT USE OF INSULIN (H): ICD-10-CM

## 2025-08-06 LAB
EST. AVERAGE GLUCOSE BLD GHB EST-MCNC: 209 MG/DL
HBA1C MFR BLD: 8.9 % (ref 0–5.6)

## 2025-08-06 PROCEDURE — 83036 HEMOGLOBIN GLYCOSYLATED A1C: CPT | Performed by: FAMILY MEDICINE

## 2025-08-06 PROCEDURE — 36415 COLL VENOUS BLD VENIPUNCTURE: CPT | Performed by: FAMILY MEDICINE

## 2025-08-06 PROCEDURE — 77067 SCR MAMMO BI INCL CAD: CPT | Mod: TC | Performed by: RADIOLOGY

## 2025-08-06 RX ORDER — GLIPIZIDE 10 MG/1
10 TABLET, FILM COATED, EXTENDED RELEASE ORAL DAILY
Qty: 90 TABLET | Refills: 3 | Status: SHIPPED | OUTPATIENT
Start: 2025-08-06